# Patient Record
Sex: MALE | Race: WHITE | Employment: OTHER | URBAN - METROPOLITAN AREA
[De-identification: names, ages, dates, MRNs, and addresses within clinical notes are randomized per-mention and may not be internally consistent; named-entity substitution may affect disease eponyms.]

---

## 2017-01-10 ENCOUNTER — TELEPHONE (OUTPATIENT)
Dept: INTERNAL MEDICINE CLINIC | Facility: CLINIC | Age: 72
End: 2017-01-10

## 2017-01-10 ENCOUNTER — OFFICE VISIT (OUTPATIENT)
Dept: INTERNAL MEDICINE CLINIC | Facility: CLINIC | Age: 72
End: 2017-01-10

## 2017-01-10 VITALS
DIASTOLIC BLOOD PRESSURE: 90 MMHG | HEIGHT: 75 IN | TEMPERATURE: 98 F | BODY MASS INDEX: 25.24 KG/M2 | HEART RATE: 73 BPM | OXYGEN SATURATION: 99 % | WEIGHT: 203 LBS | SYSTOLIC BLOOD PRESSURE: 180 MMHG

## 2017-01-10 DIAGNOSIS — N28.9 RENAL INSUFFICIENCY: ICD-10-CM

## 2017-01-10 DIAGNOSIS — I10 HYPERTENSION, BENIGN: ICD-10-CM

## 2017-01-10 DIAGNOSIS — I48.91 ATRIAL FIBRILLATION, NEW ONSET (HCC): ICD-10-CM

## 2017-01-10 DIAGNOSIS — I25.9 CHRONIC ISCHEMIC HEART DISEASE: Primary | ICD-10-CM

## 2017-01-10 PROCEDURE — 99214 OFFICE O/P EST MOD 30 MIN: CPT | Performed by: INTERNAL MEDICINE

## 2017-01-10 PROCEDURE — G0463 HOSPITAL OUTPT CLINIC VISIT: HCPCS | Performed by: INTERNAL MEDICINE

## 2017-01-10 RX ORDER — DOCUSATE SODIUM 100 MG/1
100 CAPSULE, LIQUID FILLED ORAL 2 TIMES DAILY
Qty: 180 CAPSULE | Refills: 3 | Status: SHIPPED | OUTPATIENT
Start: 2017-01-10 | End: 2017-12-22

## 2017-01-10 NOTE — PROGRESS NOTES
Santa Green is a 66year old male. HPI:   He is feeling well and has no new problems or complaints. Good energy, good appetite. His BP has been normal at home and normal at other doctors offices. His BP today is elevated.      SR: no chest pain or sob, \"Management:  PTCA (1994)\"   • Cerebrovascular accident [facility]) 1993     per NextGen:  \"Cerebrovascular accident with LT ned - total resolution\"   • Carotid artery obstruction      per NextGen:  \"100% obstruction RT ICA; 50-60% stenosis L\"   • Atrial atraumatic, normocephalic,ears and throat are clear  NECK: supple,no adenopathy,no bruits  LUNGS: clear to auscultation  CARDIO: RRR without murmur  GI: good BS's,no masses, HSM or tenderness  EXTREMITIES: no cyanosis, clubbing; trace-1+ edema    ASSESSMEN

## 2017-01-10 NOTE — TELEPHONE ENCOUNTER
Yang faxed a PA for: Eliquis 5 mg tab  #180 qty     last filled: 01/10/17        * Not covered by insurance please prescribe something else thanks fax.  # [phone]  Placed in [address] folder routed to Rx

## 2017-01-13 ENCOUNTER — SURGERY (OUTPATIENT)
Age: 72
End: 2017-01-13

## 2017-01-13 NOTE — TELEPHONE ENCOUNTER
Spoke to pt---he has enough of supply from  pat assistance to last til the end of the month  Will file PA next week

## 2017-01-17 ENCOUNTER — APPOINTMENT (OUTPATIENT)
Dept: LAB | Facility: HOSPITAL | Age: 72
End: 2017-01-17
Attending: FAMILY MEDICINE
Payer: MEDICARE

## 2017-01-17 ENCOUNTER — TELEPHONE (OUTPATIENT)
Dept: INTERNAL MEDICINE CLINIC | Facility: CLINIC | Age: 72
End: 2017-01-17

## 2017-01-17 DIAGNOSIS — R82.90 ABNORMAL URINALYSIS: Primary | ICD-10-CM

## 2017-01-17 DIAGNOSIS — D50.8 OTHER IRON DEFICIENCY ANEMIA: ICD-10-CM

## 2017-01-17 DIAGNOSIS — D63.1 ANEMIA IN CHRONIC KIDNEY DISEASE: ICD-10-CM

## 2017-01-17 DIAGNOSIS — I48.91 ATRIAL FIBRILLATION, NEW ONSET (HCC): ICD-10-CM

## 2017-01-17 DIAGNOSIS — I10 HYPERTENSION, BENIGN: ICD-10-CM

## 2017-01-17 DIAGNOSIS — N28.9 RENAL INSUFFICIENCY: ICD-10-CM

## 2017-01-17 DIAGNOSIS — N18.9 ANEMIA IN CHRONIC KIDNEY DISEASE: ICD-10-CM

## 2017-01-17 DIAGNOSIS — I25.9 CHRONIC ISCHEMIC HEART DISEASE: ICD-10-CM

## 2017-01-17 LAB
BILIRUB UR QL: NEGATIVE
CHOLEST SERPL-MCNC: 178 MG/DL (ref 110–200)
COLOR UR: YELLOW
GLUCOSE UR-MCNC: NEGATIVE MG/DL
HDLC SERPL-MCNC: 114 MG/DL
KETONES UR-MCNC: NEGATIVE MG/DL
LDLC SERPL CALC-MCNC: 55 MG/DL (ref 0–99)
NITRITE UR QL STRIP.AUTO: NEGATIVE
NONHDLC SERPL-MCNC: 64 MG/DL
PH UR: 6 [PH] (ref 5–8)
PROT UR-MCNC: 30 MG/DL
PSA SERPL-MCNC: 1 NG/ML (ref 0–4)
RBC #/AREA URNS AUTO: 3 /HPF
SP GR UR STRIP: 1.01 (ref 1–1.03)
TRIGL SERPL-MCNC: 43 MG/DL (ref 1–149)
TSH SERPL-ACNC: 2.62 UIU/ML (ref 0.34–5.6)
UROBILINOGEN UR STRIP-ACNC: <2
VIT C UR-MCNC: NEGATIVE MG/DL
WBC #/AREA URNS AUTO: 461 /HPF

## 2017-01-17 PROCEDURE — 80061 LIPID PANEL: CPT

## 2017-01-17 PROCEDURE — 82607 VITAMIN B-12: CPT

## 2017-01-17 PROCEDURE — 84466 ASSAY OF TRANSFERRIN: CPT

## 2017-01-17 PROCEDURE — 84443 ASSAY THYROID STIM HORMONE: CPT

## 2017-01-17 PROCEDURE — 83540 ASSAY OF IRON: CPT

## 2017-01-17 PROCEDURE — 82728 ASSAY OF FERRITIN: CPT

## 2017-01-17 PROCEDURE — 82746 ASSAY OF FOLIC ACID SERUM: CPT

## 2017-01-17 PROCEDURE — 36415 COLL VENOUS BLD VENIPUNCTURE: CPT | Performed by: INTERNAL MEDICINE

## 2017-01-17 PROCEDURE — 81001 URINALYSIS AUTO W/SCOPE: CPT

## 2017-01-17 PROCEDURE — 80053 COMPREHEN METABOLIC PANEL: CPT | Performed by: INTERNAL MEDICINE

## 2017-01-17 PROCEDURE — 85025 COMPLETE CBC W/AUTO DIFF WBC: CPT | Performed by: INTERNAL MEDICINE

## 2017-01-17 NOTE — TELEPHONE ENCOUNTER
Called client services, unable to add on urine culture because it was not done in sterile cup. Ordered urine culture. Called patient. Patient denied any UTI symptoms. No pain/burning/difficulty urinating. He states he see's Dr. Justin Llamas next week.  Asked him t

## 2017-01-17 NOTE — TELEPHONE ENCOUNTER
Dr. Christine Lilly, please review patient labs on Dr. Yong Martinez behalf and advise. Ok to wait regarding UA? Pt was just discharged from hospital recently- but labs look like they were done after discharge.

## 2017-01-18 ENCOUNTER — APPOINTMENT (OUTPATIENT)
Dept: LAB | Age: 72
End: 2017-01-18
Attending: INTERNAL MEDICINE
Payer: MEDICARE

## 2017-01-18 DIAGNOSIS — R82.90 ABNORMAL URINALYSIS: ICD-10-CM

## 2017-01-18 PROCEDURE — 87088 URINE BACTERIA CULTURE: CPT

## 2017-01-18 PROCEDURE — 87077 CULTURE AEROBIC IDENTIFY: CPT

## 2017-01-18 PROCEDURE — 87184 SC STD DISK METHOD PER PLATE: CPT

## 2017-01-18 PROCEDURE — 87086 URINE CULTURE/COLONY COUNT: CPT

## 2017-01-18 PROCEDURE — 87186 SC STD MICRODIL/AGAR DIL: CPT

## 2017-01-19 NOTE — TELEPHONE ENCOUNTER
LM for pt that the 1-800 below is automated and states we need to call the 1-800 # on the back of the pt's card

## 2017-01-19 NOTE — TELEPHONE ENCOUNTER
Dr. Florina North - please review pt urine culture on Dr. Buckley Medicine behalf - prelim result + E.  Coli

## 2017-01-20 RX ORDER — NITROFURANTOIN 25; 75 MG/1; MG/1
100 CAPSULE ORAL 2 TIMES DAILY
Qty: 14 CAPSULE | Refills: 0 | Status: SHIPPED | OUTPATIENT
Start: 2017-01-20 | End: 2017-01-26 | Stop reason: ALTCHOICE

## 2017-01-20 NOTE — TELEPHONE ENCOUNTER
Nursing please call patient, tell him his urine is growing bacteria, but I still only have preliminary results, nonetheless I want him to start on antibiotics and we will call him with the final culture once completed, send in Macrobid 100 mg twice daily f

## 2017-01-22 ENCOUNTER — TELEPHONE (OUTPATIENT)
Dept: INTERNAL MEDICINE CLINIC | Facility: CLINIC | Age: 72
End: 2017-01-22

## 2017-01-22 NOTE — TELEPHONE ENCOUNTER
Recent labs good but he has recurrent UTI - I will talk to Dr Dionne Marshall on Monday and his office will call him. Lipids good, blood count mildly low as before, kidneys mildly impaired but very acceptable.

## 2017-01-23 NOTE — TELEPHONE ENCOUNTER
Form rec'd from [employer] regarding Eliquis requesting:   Formulary exception for Eliquis  Form placed in purple folder  Tasked to [employer]

## 2017-01-25 ENCOUNTER — TELEPHONE (OUTPATIENT)
Dept: INTERNAL MEDICINE CLINIC | Facility: CLINIC | Age: 72
End: 2017-01-25

## 2017-01-25 ENCOUNTER — PRIOR ORIGINAL RECORDS (OUTPATIENT)
Dept: OTHER | Age: 72
End: 2017-01-25

## 2017-01-25 ENCOUNTER — OFFICE VISIT (OUTPATIENT)
Dept: HEMATOLOGY/ONCOLOGY | Facility: HOSPITAL | Age: 72
End: 2017-01-25
Attending: INTERNAL MEDICINE
Payer: MEDICARE

## 2017-01-25 VITALS
BODY MASS INDEX: 25.41 KG/M2 | DIASTOLIC BLOOD PRESSURE: 92 MMHG | HEIGHT: 74 IN | TEMPERATURE: 98 F | SYSTOLIC BLOOD PRESSURE: 178 MMHG | RESPIRATION RATE: 18 BRPM | WEIGHT: 198 LBS | HEART RATE: 64 BPM

## 2017-01-25 DIAGNOSIS — N28.9 RENAL INSUFFICIENCY: ICD-10-CM

## 2017-01-25 DIAGNOSIS — K22.70 BARRETT'S ESOPHAGUS WITHOUT DYSPLASIA: ICD-10-CM

## 2017-01-25 DIAGNOSIS — D50.9 IRON DEFICIENCY ANEMIA, UNSPECIFIED IRON DEFICIENCY ANEMIA TYPE: Primary | ICD-10-CM

## 2017-01-25 PROCEDURE — 99213 OFFICE O/P EST LOW 20 MIN: CPT | Performed by: INTERNAL MEDICINE

## 2017-01-25 NOTE — TELEPHONE ENCOUNTER
Caio Holm will be re-fexing a form for a formulary change. don't see that we got this form this is regarding Eliquis. Ph# [phone] if needed press option 2.  Ref# [id number]

## 2017-01-25 NOTE — TELEPHONE ENCOUNTER
Per DR. COUCH faxed a message to  re: insurance will only cover Pradaxa or Xarelto and if  would make the change and notify the patient--see scanning

## 2017-01-25 NOTE — PROGRESS NOTES
HPI     Mr. Javon Capps is seen in the office for anemia. Pt underwent extensive work up in the past including bone marrow biopsy which was negative. Pt is on oral iron once daily, B12 and folate supplements. Pt has Quevedo's esophagus.  Underwent surveillan Dutasteride (AVODART) 0.5 MG Oral Cap Take 0.5 mg by mouth daily. Disp:  Rfl:    tamsulosin HCl 0.4 MG Oral Cap Take by mouth daily. Disp:  Rfl:    aspirin 81 MG Oral Tab Take 81 mg by mouth daily.  Disp:  Rfl:    Isosorbide Mononitrate ER 30 MG Oral Tabl per NextGen:  \"Anemia mild secondary to gastritis\"   • Anemia in chronic kidney disease 10/4/2013   • Iron deficiency anemia 7/16/2013   • Congestive heart failure (CHF) (HCC)    • Colon polyp    • Diverticulosis    • Recent change in frequency of bowel /92 mmHg  Pulse 64  Temp(Src) 97.9 °F (36.6 °C) (Oral)  Resp 18  Ht 1.88 m (6' 2\")  Wt 89.812 kg (198 lb)  BMI 25.41 kg/m2    Physical Exam   Constitutional: He is oriented to person, place, and time.  Vital signs are normal. He appears well-develope diagnosis)  Quevedo's esophagus without dysplasia  Renal insufficiency     In regards to the anemia, the etiology is multifactorial including iron deficiency, CKD. Pt is on oral iron, folate and B12 supplements. Hgb and Ferritin levels have improved.  Discu

## 2017-01-26 ENCOUNTER — PRIOR ORIGINAL RECORDS (OUTPATIENT)
Dept: OTHER | Age: 72
End: 2017-01-26

## 2017-01-26 ENCOUNTER — APPOINTMENT (OUTPATIENT)
Dept: LAB | Facility: HOSPITAL | Age: 72
End: 2017-01-26
Attending: UROLOGY
Payer: MEDICARE

## 2017-01-26 ENCOUNTER — OFFICE VISIT (OUTPATIENT)
Dept: SURGERY | Facility: CLINIC | Age: 72
End: 2017-01-26

## 2017-01-26 VITALS
SYSTOLIC BLOOD PRESSURE: 180 MMHG | DIASTOLIC BLOOD PRESSURE: 98 MMHG | RESPIRATION RATE: 20 BRPM | HEIGHT: 74 IN | TEMPERATURE: 98 F | HEART RATE: 82 BPM | WEIGHT: 198 LBS | BODY MASS INDEX: 25.41 KG/M2

## 2017-01-26 DIAGNOSIS — N30.00 ACUTE CYSTITIS WITHOUT HEMATURIA: ICD-10-CM

## 2017-01-26 DIAGNOSIS — N30.00 ACUTE CYSTITIS WITHOUT HEMATURIA: Primary | ICD-10-CM

## 2017-01-26 LAB
BACTERIA UR QL AUTO: NEGATIVE /HPF
BILIRUB UR QL: NEGATIVE
CLARITY UR: CLEAR
COLOR UR: YELLOW
GLUCOSE UR-MCNC: NEGATIVE MG/DL
HGB UR QL STRIP.AUTO: NEGATIVE
KETONES UR-MCNC: NEGATIVE MG/DL
LEUKOCYTE ESTERASE UR QL STRIP.AUTO: NEGATIVE
NITRITE UR QL STRIP.AUTO: NEGATIVE
PH UR: 7 [PH] (ref 5–8)
PROT UR-MCNC: 30 MG/DL
RBC #/AREA URNS AUTO: <1 /HPF
SP GR UR STRIP: 1.01 (ref 1–1.03)
UROBILINOGEN UR STRIP-ACNC: <2
VIT C UR-MCNC: NEGATIVE MG/DL
WBC #/AREA URNS AUTO: 2 /HPF

## 2017-01-26 PROCEDURE — 87086 URINE CULTURE/COLONY COUNT: CPT

## 2017-01-26 PROCEDURE — G0463 HOSPITAL OUTPT CLINIC VISIT: HCPCS | Performed by: UROLOGY

## 2017-01-26 PROCEDURE — 99214 OFFICE O/P EST MOD 30 MIN: CPT | Performed by: UROLOGY

## 2017-01-26 PROCEDURE — 81001 URINALYSIS AUTO W/SCOPE: CPT

## 2017-01-26 NOTE — TELEPHONE ENCOUNTER
Called patient. He already started taking Macrobid on 1/20 as prescribed by Dr Lex Franco . He has one more pill left to take tomorrow. He has an appointment with Dr Reny Bar tomorrow at 8 am.   Dr Maggie Jackson made aware.

## 2017-01-26 NOTE — PROGRESS NOTES
[address] Patient Status:  Outpatient    1945 MRN [id number]   Location [facility] Attending Ramya Hernandez.  [address] Day #  PCP Fritz Curling.  MD Austin Rios is a 66 ye include sebaceous cyst removed over 30 years ago right  hip pinning secondary to trauma 1994 angioplasty finally hemorrhoid surgery March 2016 and this was the reason for postop urinary retention also had cardioversion November 2015 although patient still Cap Take 0.5 mg by mouth daily. Disp:  Rfl:    tamsulosin HCl 0.4 MG Oral Cap Take by mouth daily. Disp:  Rfl:    aspirin 81 MG Oral Tab Take 81 mg by mouth daily.  Disp:  Rfl:    Isosorbide Mononitrate ER 30 MG Oral Tablet 24 Hr Take 1 tablet (30 mg total) Patient is a 77-year-old white male who underwent urgent hemorrhoidectomy secondary to chronic hemorrhoids March 2016.   However in the postop period he was found to have moderate to severe bleeding complicated by anticoagulation after surgery and again [facility] is in complete agreement spent 30 minutes with patient well over half time in face-to-face discussion of further evaluation and therapy and again we will contact patient with today's urinalysis and culture as soon as it becomes available.   Again we will ha

## 2017-01-26 NOTE — TELEPHONE ENCOUNTER
He has E coli UTI not sensitive to Cipro,     Will start Macrobid 100mg bid for 2 weeks and advise Dr Tyler Chan

## 2017-01-27 ENCOUNTER — PRIOR ORIGINAL RECORDS (OUTPATIENT)
Dept: OTHER | Age: 72
End: 2017-01-27

## 2017-01-27 ENCOUNTER — TELEPHONE (OUTPATIENT)
Dept: SURGERY | Facility: CLINIC | Age: 72
End: 2017-01-27

## 2017-01-27 NOTE — TELEPHONE ENCOUNTER
Notes Recorded by Joe Dumont MD on 1/27/2017 at 12:07 PM  Please contact patient with negative urine culture result      Pt contacted and aware of the results

## 2017-01-27 NOTE — TELEPHONE ENCOUNTER
[place] faxed a reminder that they are still waiting for PA for Eliquis. Placed in purple folder.

## 2017-01-30 NOTE — TELEPHONE ENCOUNTER
[place] faxed a note - \"Eliquis is not covered on pt's insurance. Please send alternative\". Placed in purple folder.                   Tasked to Rx

## 2017-01-31 ENCOUNTER — PRIOR ORIGINAL RECORDS (OUTPATIENT)
Dept: OTHER | Age: 72
End: 2017-01-31

## 2017-02-01 NOTE — TELEPHONE ENCOUNTER
Read notes prior to this---pt has already been changed by [facility] to formulary alternative ;   No action needed at this time

## 2017-02-28 ENCOUNTER — OFFICE VISIT (OUTPATIENT)
Dept: SURGERY | Facility: CLINIC | Age: 72
End: 2017-02-28

## 2017-02-28 VITALS
DIASTOLIC BLOOD PRESSURE: 80 MMHG | TEMPERATURE: 98 F | WEIGHT: 202 LBS | HEART RATE: 68 BPM | SYSTOLIC BLOOD PRESSURE: 160 MMHG | RESPIRATION RATE: 20 BRPM | HEIGHT: 75 IN | BODY MASS INDEX: 25.12 KG/M2

## 2017-02-28 DIAGNOSIS — R33.9 URINARY RETENTION: ICD-10-CM

## 2017-02-28 DIAGNOSIS — N30.00 ACUTE CYSTITIS WITHOUT HEMATURIA: Primary | ICD-10-CM

## 2017-02-28 LAB
BILIRUB UR QL: NEGATIVE
COLOR UR: YELLOW
GLUCOSE UR-MCNC: NEGATIVE MG/DL
HGB UR QL STRIP.AUTO: NEGATIVE
KETONES UR-MCNC: NEGATIVE MG/DL
NITRITE UR QL STRIP.AUTO: NEGATIVE
PH UR: 5 [PH] (ref 5–8)
PROT UR-MCNC: 100 MG/DL
RBC #/AREA URNS AUTO: 2 /HPF
SP GR UR STRIP: 1.01 (ref 1–1.03)
UROBILINOGEN UR STRIP-ACNC: <2
VIT C UR-MCNC: NEGATIVE MG/DL
WBC #/AREA URNS AUTO: 248 /HPF

## 2017-02-28 PROCEDURE — G0463 HOSPITAL OUTPT CLINIC VISIT: HCPCS | Performed by: UROLOGY

## 2017-02-28 PROCEDURE — 51798 US URINE CAPACITY MEASURE: CPT | Performed by: UROLOGY

## 2017-02-28 PROCEDURE — 99214 OFFICE O/P EST MOD 30 MIN: CPT | Performed by: UROLOGY

## 2017-02-28 RX ORDER — DUTASTERIDE 0.5 MG/1
CAPSULE, LIQUID FILLED ORAL
Qty: 30 CAPSULE | Refills: 11 | Status: SHIPPED | OUTPATIENT
Start: 2017-02-28 | End: 2018-07-25

## 2017-02-28 RX ORDER — RIVAROXABAN 15 MG/1
TABLET, FILM COATED ORAL
COMMUNITY
Start: 2017-02-27 | End: 2017-08-08

## 2017-02-28 NOTE — TELEPHONE ENCOUNTER
Dr. Flaca Vogel, pt [facility] 2/28/17 and he is asking for a refill on his dutasteride if you agree please review and sign med, thank you! Copied and pasted part of your note below. ..     Past medical history past surgical history medications allergies review of system

## 2017-02-28 NOTE — PROGRESS NOTES
[address] Patient Status:  Outpatient    1945 MRN [id number]   Location [address] [place] [address] Attending Enrique Oliva. [address] Day #  PCP Bessy Callahan.  MD Iliana Rios is a 66 ye Dutasteride (AVODART) 0.5 MG Oral Cap Take 0.5 mg by mouth daily. Disp:  Rfl:    tamsulosin HCl 0.4 MG Oral Cap Take by mouth daily. Disp:  Rfl:    aspirin 81 MG Oral Tab Take 81 mg by mouth daily.  Disp:  Rfl:    Isosorbide Mononitrate ER 30 MG Oral Tabl 1000 MCG Oral Tab Take  by mouth. take 1 by Oral route in the morning Disp:  Rfl:    Ferrous Sulfate 325 (65 FE) MG Oral Tab Take 325 mg by mouth daily with breakfast.   Disp:  Rfl:    folic acid (FOLVITE) 241 MCG Oral Tab Take  by mouth.  take 1 tablet (0. severe bleeding along with patient's anticoagulation for atrial fibrillation and shortly after his outpatient surgery he need to be hospitalized for urinary retention prior to above patient did admit to pre-existing BPH with obstruction however not bothers

## 2017-03-01 ENCOUNTER — TELEPHONE (OUTPATIENT)
Dept: SURGERY | Facility: CLINIC | Age: 72
End: 2017-03-01

## 2017-03-01 NOTE — TELEPHONE ENCOUNTER
I spoke with pt and informed him of [facility]'s results msg below and he would prefer to wait for the final sensitivities to see what abx would best tx the infection. He states that he does not have any symptoms at this time.  I asked him to call the office veronica

## 2017-03-01 NOTE — TELEPHONE ENCOUNTER
----- Message from Dionna Spencer MD sent at 3/1/2017  7:33 AM CST -----  Please contact patient with abnormal urinalysis consistent with infection.   We will wait culture results for further treatment since patient seems to suffer from recurrent UTIs possi

## 2017-03-01 NOTE — TELEPHONE ENCOUNTER
----- Message from Sravan Gagnon MD sent at 3/1/2017  1:55 PM CST -----  Please contact patient with positive urine culture result with a E. coli although final culture and sensitivities not available patient can either wait for 24 hours until we get cult

## 2017-03-02 RX ORDER — SULFAMETHOXAZOLE AND TRIMETHOPRIM 800; 160 MG/1; MG/1
1 TABLET ORAL 2 TIMES DAILY
Qty: 20 TABLET | Refills: 1 | Status: SHIPPED | OUTPATIENT
Start: 2017-03-02 | End: 2017-03-09

## 2017-03-02 NOTE — TELEPHONE ENCOUNTER
Patient contacted. Patient is aware of his urine culture results and medication sent to his pharmacy, and verbalized understanding.

## 2017-03-02 NOTE — TELEPHONE ENCOUNTER
LMTCB. Also, left message for patient to check his [facility]. When patient call back, please transfer to [phone]. Medication sent.

## 2017-03-02 NOTE — TELEPHONE ENCOUNTER
----- Message from Silvino Cisneros MD sent at 3/2/2017  8:04 AM CST -----  Please contact patient with positive urine culture result and E. coli is resistant to Cipro therefore appropriate treatment would be Bactrim DS 1 p.o. twice daily #20, one refill ple

## 2017-03-02 NOTE — TELEPHONE ENCOUNTER
I have no trouble with patient wanting to wait until final sensitivities are available for appropriate treatment with antibiotics will contact patient again when final culture and sensitivities available

## 2017-03-06 RX ORDER — PANTOPRAZOLE SODIUM 40 MG/1
TABLET, DELAYED RELEASE ORAL
Qty: 90 TABLET | Refills: 3 | Status: SHIPPED | OUTPATIENT
Start: 2017-03-06 | End: 2018-02-21

## 2017-05-04 RX ORDER — SIMVASTATIN 40 MG
TABLET ORAL
Qty: 90 TABLET | Refills: 3 | Status: SHIPPED | OUTPATIENT
Start: 2017-05-04 | End: 2018-04-20

## 2017-05-04 RX ORDER — HYDRALAZINE HYDROCHLORIDE 25 MG/1
TABLET, FILM COATED ORAL
Qty: 270 TABLET | Refills: 3 | Status: SHIPPED | OUTPATIENT
Start: 2017-05-04 | End: 2017-09-01

## 2017-05-04 RX ORDER — ISOSORBIDE MONONITRATE 30 MG/1
TABLET, EXTENDED RELEASE ORAL
Qty: 90 TABLET | Refills: 3 | Status: SHIPPED | OUTPATIENT
Start: 2017-05-04 | End: 2018-04-20

## 2017-05-04 NOTE — TELEPHONE ENCOUNTER
Refills done    To DR. KHOA Cameron 1/17/2017  AST 66 mildly elevated but prior levels were not;  PRINCE

## 2017-05-10 ENCOUNTER — LAB ENCOUNTER (OUTPATIENT)
Dept: LAB | Age: 72
End: 2017-05-10
Attending: INTERNAL MEDICINE
Payer: MEDICARE

## 2017-05-10 ENCOUNTER — PRIOR ORIGINAL RECORDS (OUTPATIENT)
Dept: OTHER | Age: 72
End: 2017-05-10

## 2017-05-10 ENCOUNTER — OFFICE VISIT (OUTPATIENT)
Dept: INTERNAL MEDICINE CLINIC | Facility: CLINIC | Age: 72
End: 2017-05-10

## 2017-05-10 VITALS
BODY MASS INDEX: 26.24 KG/M2 | HEART RATE: 88 BPM | TEMPERATURE: 98 F | SYSTOLIC BLOOD PRESSURE: 178 MMHG | DIASTOLIC BLOOD PRESSURE: 90 MMHG | WEIGHT: 211 LBS | HEIGHT: 75 IN | RESPIRATION RATE: 16 BRPM

## 2017-05-10 DIAGNOSIS — I25.9 CHRONIC ISCHEMIC HEART DISEASE: ICD-10-CM

## 2017-05-10 DIAGNOSIS — I65.22 CAROTID ARTERY STENOSIS WITHOUT CEREBRAL INFARCTION, LEFT: ICD-10-CM

## 2017-05-10 DIAGNOSIS — N28.9 RENAL INSUFFICIENCY: ICD-10-CM

## 2017-05-10 DIAGNOSIS — I10 HYPERTENSION, BENIGN: ICD-10-CM

## 2017-05-10 DIAGNOSIS — I48.91 ATRIAL FIBRILLATION, NEW ONSET (HCC): ICD-10-CM

## 2017-05-10 DIAGNOSIS — N18.9 ANEMIA IN CHRONIC KIDNEY DISEASE: ICD-10-CM

## 2017-05-10 DIAGNOSIS — D63.1 ANEMIA IN CHRONIC KIDNEY DISEASE: ICD-10-CM

## 2017-05-10 DIAGNOSIS — K22.70 BARRETT'S ESOPHAGUS WITHOUT DYSPLASIA: ICD-10-CM

## 2017-05-10 DIAGNOSIS — D63.1 ANEMIA IN CHRONIC KIDNEY DISEASE: Primary | ICD-10-CM

## 2017-05-10 DIAGNOSIS — N18.9 ANEMIA IN CHRONIC KIDNEY DISEASE: Primary | ICD-10-CM

## 2017-05-10 PROCEDURE — 80061 LIPID PANEL: CPT

## 2017-05-10 PROCEDURE — 99214 OFFICE O/P EST MOD 30 MIN: CPT | Performed by: INTERNAL MEDICINE

## 2017-05-10 PROCEDURE — 85025 COMPLETE CBC W/AUTO DIFF WBC: CPT

## 2017-05-10 PROCEDURE — 36415 COLL VENOUS BLD VENIPUNCTURE: CPT

## 2017-05-10 PROCEDURE — 80053 COMPREHEN METABOLIC PANEL: CPT

## 2017-05-10 PROCEDURE — G0463 HOSPITAL OUTPT CLINIC VISIT: HCPCS | Performed by: INTERNAL MEDICINE

## 2017-05-10 PROCEDURE — 84443 ASSAY THYROID STIM HORMONE: CPT

## 2017-05-10 RX ORDER — INDAPAMIDE 1.25 MG/1
TABLET, FILM COATED ORAL
Qty: 45 TABLET | Refills: 3 | Status: SHIPPED | OUTPATIENT
Start: 2017-05-10 | End: 2018-04-22

## 2017-05-10 NOTE — PROGRESS NOTES
Isidro Stevens is a 66year old male. HPI:   In general he has been feeling well except for aching in the legs (statins) which he has had for years. Good energy,  Good appetite. SR: no chest pain or sob, no gu or gi sx.         1. Anemia in chronic k NextGen:  \"Ischemic heart disease\";  \"Management:  PTCA (1994)\"   • Cerebrovascular accident [facility] 1993     per NextGen:  \"Cerebrovascular accident with LT ned - total resolution\"   • Carotid artery obstruction      per NextGen:  \"100% obstruction R 26.37 kg/m2  GENERAL: well developed, well nourished,in no apparent distress  SKIN: no rashes,no suspicious lesions  HEENT: atraumatic, normocephalic,ears and throat are clear  NECK: supple,no adenopathy,no bruits  LUNGS: clear to auscultation  CARDIO: RRR

## 2017-05-27 ENCOUNTER — HOSPITAL ENCOUNTER (OUTPATIENT)
Dept: ULTRASOUND IMAGING | Facility: HOSPITAL | Age: 72
Discharge: HOME | End: 2017-05-27
Attending: INTERNAL MEDICINE
Payer: MEDICARE

## 2017-05-27 DIAGNOSIS — I65.22 CAROTID ARTERY STENOSIS WITHOUT CEREBRAL INFARCTION, LEFT: ICD-10-CM

## 2017-05-27 PROCEDURE — 93880 EXTRACRANIAL BILAT STUDY: CPT | Performed by: INTERNAL MEDICINE

## 2017-06-13 ENCOUNTER — TELEPHONE (OUTPATIENT)
Dept: INTERNAL MEDICINE CLINIC | Facility: CLINIC | Age: 72
End: 2017-06-13

## 2017-06-13 DIAGNOSIS — D64.9 ANEMIA, UNSPECIFIED TYPE: Primary | ICD-10-CM

## 2017-06-13 DIAGNOSIS — N28.9 RENAL INSUFFICIENCY: ICD-10-CM

## 2017-06-13 NOTE — TELEPHONE ENCOUNTER
I discussed labs with Mr Hillary Millan - creatinine up from 1.6 to 1.9, Hb down from 11.4 to 9.2    I want to repeat labs and he will come in next month. Also, carotid US looks good.

## 2017-07-05 ENCOUNTER — PRIOR ORIGINAL RECORDS (OUTPATIENT)
Dept: OTHER | Age: 72
End: 2017-07-05

## 2017-07-05 ENCOUNTER — LAB ENCOUNTER (OUTPATIENT)
Dept: LAB | Age: 72
End: 2017-07-05
Attending: INTERNAL MEDICINE
Payer: MEDICARE

## 2017-07-05 DIAGNOSIS — N28.9 RENAL INSUFFICIENCY: ICD-10-CM

## 2017-07-05 DIAGNOSIS — D64.9 ANEMIA, UNSPECIFIED TYPE: ICD-10-CM

## 2017-07-05 LAB
ALBUMIN SERPL BCP-MCNC: 3.5 G/DL (ref 3.5–4.8)
ALBUMIN/GLOB SERPL: 1.1 {RATIO} (ref 1–2)
ALP SERPL-CCNC: 73 U/L (ref 32–100)
ALT SERPL-CCNC: 16 U/L (ref 17–63)
ANION GAP SERPL CALC-SCNC: 10 MMOL/L (ref 0–18)
AST SERPL-CCNC: 27 U/L (ref 15–41)
BASOPHILS # BLD: 0 K/UL (ref 0–0.2)
BASOPHILS NFR BLD: 1 %
BILIRUB SERPL-MCNC: 0.9 MG/DL (ref 0.3–1.2)
BUN SERPL-MCNC: 39 MG/DL (ref 8–20)
BUN/CREAT SERPL: 28.1 (ref 10–20)
CALCIUM SERPL-MCNC: 9 MG/DL (ref 8.5–10.5)
CHLORIDE SERPL-SCNC: 102 MMOL/L (ref 95–110)
CO2 SERPL-SCNC: 22 MMOL/L (ref 22–32)
CREAT SERPL-MCNC: 1.39 MG/DL (ref 0.5–1.5)
EOSINOPHIL # BLD: 0.1 K/UL (ref 0–0.7)
EOSINOPHIL NFR BLD: 1 %
ERYTHROCYTE [DISTWIDTH] IN BLOOD BY AUTOMATED COUNT: 13.2 % (ref 11–15)
FERRITIN SERPL IA-MCNC: 155 NG/ML (ref 24–336)
GLOBULIN PLAS-MCNC: 3.1 G/DL (ref 2.5–3.7)
GLUCOSE SERPL-MCNC: 92 MG/DL (ref 70–99)
HCT VFR BLD AUTO: 31.4 % (ref 41–52)
HGB BLD-MCNC: 10.6 G/DL (ref 13.5–17.5)
LYMPHOCYTES # BLD: 0.5 K/UL (ref 1–4)
LYMPHOCYTES NFR BLD: 9 %
MCH RBC QN AUTO: 32.7 PG (ref 27–32)
MCHC RBC AUTO-ENTMCNC: 33.7 G/DL (ref 32–37)
MCV RBC AUTO: 97 FL (ref 80–100)
MONOCYTES # BLD: 0.6 K/UL (ref 0–1)
MONOCYTES NFR BLD: 10 %
NEUTROPHILS # BLD AUTO: 4.4 K/UL (ref 1.8–7.7)
NEUTROPHILS NFR BLD: 79 %
OSMOLALITY UR CALC.SUM OF ELEC: 287 MOSM/KG (ref 275–295)
PLATELET # BLD AUTO: 224 K/UL (ref 140–400)
PMV BLD AUTO: 8.1 FL (ref 7.4–10.3)
POTASSIUM SERPL-SCNC: 4.5 MMOL/L (ref 3.3–5.1)
PROT SERPL-MCNC: 6.6 G/DL (ref 5.9–8.4)
RBC # BLD AUTO: 3.23 M/UL (ref 4.5–5.9)
SODIUM SERPL-SCNC: 134 MMOL/L (ref 136–144)
VIT B12 SERPL-MCNC: >1500 PG/ML (ref 181–914)
WBC # BLD AUTO: 5.6 K/UL (ref 4–11)

## 2017-07-05 PROCEDURE — 80053 COMPREHEN METABOLIC PANEL: CPT

## 2017-07-05 PROCEDURE — 82728 ASSAY OF FERRITIN: CPT

## 2017-07-05 PROCEDURE — 82607 VITAMIN B-12: CPT

## 2017-07-05 PROCEDURE — 36415 COLL VENOUS BLD VENIPUNCTURE: CPT

## 2017-07-05 PROCEDURE — 85025 COMPLETE CBC W/AUTO DIFF WBC: CPT

## 2017-07-11 ENCOUNTER — TELEPHONE (OUTPATIENT)
Dept: INTERNAL MEDICINE CLINIC | Facility: CLINIC | Age: 72
End: 2017-07-11

## 2017-07-11 NOTE — TELEPHONE ENCOUNTER
Labs considerably improved c Hb going from 9.2 to 10.6 and creatinine down from 1.9 to 1.3.     I am very happy with these results

## 2017-07-20 ENCOUNTER — HOSPITAL ENCOUNTER (EMERGENCY)
Facility: HOSPITAL | Age: 72
Discharge: HOME | End: 2017-07-20
Attending: EMERGENCY MEDICINE
Payer: MEDICARE

## 2017-07-20 VITALS
TEMPERATURE: 98 F | DIASTOLIC BLOOD PRESSURE: 94 MMHG | HEART RATE: 67 BPM | BODY MASS INDEX: 25.67 KG/M2 | RESPIRATION RATE: 18 BRPM | WEIGHT: 200 LBS | OXYGEN SATURATION: 97 % | SYSTOLIC BLOOD PRESSURE: 193 MMHG | HEIGHT: 74 IN

## 2017-07-20 DIAGNOSIS — N30.01 CYSTITIS, ACUTE HEMORRHAGIC: Primary | ICD-10-CM

## 2017-07-20 DIAGNOSIS — I15.9 SECONDARY HYPERTENSION: ICD-10-CM

## 2017-07-20 LAB
ANION GAP SERPL CALC-SCNC: 9 MMOL/L (ref 0–18)
APTT PPP: 49.6 SECONDS (ref 23.2–35.3)
BASOPHILS # BLD: 0 K/UL (ref 0–0.2)
BASOPHILS NFR BLD: 0 %
BILIRUB UR QL: NEGATIVE
BUN SERPL-MCNC: 33 MG/DL (ref 8–20)
BUN/CREAT SERPL: 25 (ref 10–20)
CALCIUM SERPL-MCNC: 9 MG/DL (ref 8.5–10.5)
CHLORIDE SERPL-SCNC: 105 MMOL/L (ref 95–110)
CO2 SERPL-SCNC: 24 MMOL/L (ref 22–32)
CREAT SERPL-MCNC: 1.32 MG/DL (ref 0.5–1.5)
EOSINOPHIL # BLD: 0.1 K/UL (ref 0–0.7)
EOSINOPHIL NFR BLD: 1 %
ERYTHROCYTE [DISTWIDTH] IN BLOOD BY AUTOMATED COUNT: 13.6 % (ref 11–15)
GLUCOSE SERPL-MCNC: 94 MG/DL (ref 70–99)
GLUCOSE UR-MCNC: NEGATIVE MG/DL
HCT VFR BLD AUTO: 31.9 % (ref 41–52)
HGB BLD-MCNC: 10.6 G/DL (ref 13.5–17.5)
INR BLD: 2.8 (ref 0.9–1.2)
KETONES UR-MCNC: NEGATIVE MG/DL
LYMPHOCYTES # BLD: 0.3 K/UL (ref 1–4)
LYMPHOCYTES NFR BLD: 6 %
MCH RBC QN AUTO: 31.9 PG (ref 27–32)
MCHC RBC AUTO-ENTMCNC: 33.2 G/DL (ref 32–37)
MCV RBC AUTO: 96 FL (ref 80–100)
MONOCYTES # BLD: 0.4 K/UL (ref 0–1)
MONOCYTES NFR BLD: 7 %
NEUTROPHILS # BLD AUTO: 5.4 K/UL (ref 1.8–7.7)
NEUTROPHILS NFR BLD: 86 %
NITRITE UR QL STRIP.AUTO: NEGATIVE
OSMOLALITY UR CALC.SUM OF ELEC: 293 MOSM/KG (ref 275–295)
PH UR: 6 [PH] (ref 5–8)
PLATELET # BLD AUTO: 199 K/UL (ref 140–400)
PMV BLD AUTO: 8.4 FL (ref 7.4–10.3)
POTASSIUM SERPL-SCNC: 4.3 MMOL/L (ref 3.3–5.1)
PROT UR-MCNC: 100 MG/DL
PROTHROMBIN TIME: 29.2 SECONDS (ref 11.8–14.5)
RBC # BLD AUTO: 3.32 M/UL (ref 4.5–5.9)
RBC #/AREA URNS AUTO: 6198 /HPF
SODIUM SERPL-SCNC: 138 MMOL/L (ref 136–144)
SP GR UR STRIP: 1.01 (ref 1–1.03)
UROBILINOGEN UR STRIP-ACNC: <2
VIT C UR-MCNC: NEGATIVE MG/DL
WBC # BLD AUTO: 6.2 K/UL (ref 4–11)
WBC #/AREA URNS AUTO: 349 /HPF

## 2017-07-20 PROCEDURE — 99284 EMERGENCY DEPT VISIT MOD MDM: CPT

## 2017-07-20 PROCEDURE — 96361 HYDRATE IV INFUSION ADD-ON: CPT

## 2017-07-20 PROCEDURE — 96365 THER/PROPH/DIAG IV INF INIT: CPT

## 2017-07-20 PROCEDURE — 85610 PROTHROMBIN TIME: CPT | Performed by: EMERGENCY MEDICINE

## 2017-07-20 PROCEDURE — 81001 URINALYSIS AUTO W/SCOPE: CPT | Performed by: EMERGENCY MEDICINE

## 2017-07-20 PROCEDURE — 80048 BASIC METABOLIC PNL TOTAL CA: CPT | Performed by: EMERGENCY MEDICINE

## 2017-07-20 PROCEDURE — 87186 SC STD MICRODIL/AGAR DIL: CPT | Performed by: EMERGENCY MEDICINE

## 2017-07-20 PROCEDURE — 87086 URINE CULTURE/COLONY COUNT: CPT | Performed by: EMERGENCY MEDICINE

## 2017-07-20 PROCEDURE — 85730 THROMBOPLASTIN TIME PARTIAL: CPT | Performed by: EMERGENCY MEDICINE

## 2017-07-20 PROCEDURE — 85025 COMPLETE CBC W/AUTO DIFF WBC: CPT | Performed by: EMERGENCY MEDICINE

## 2017-07-20 PROCEDURE — 87088 URINE BACTERIA CULTURE: CPT | Performed by: EMERGENCY MEDICINE

## 2017-07-20 RX ORDER — HYDRALAZINE HYDROCHLORIDE 25 MG/1
25 TABLET, FILM COATED ORAL EVERY 8 HOURS SCHEDULED
Status: DISCONTINUED | OUTPATIENT
Start: 2017-07-20 | End: 2017-07-20

## 2017-07-20 RX ORDER — CEPHALEXIN 500 MG/1
500 CAPSULE ORAL 3 TIMES DAILY
Qty: 21 CAPSULE | Refills: 0 | Status: SHIPPED | OUTPATIENT
Start: 2017-07-20 | End: 2017-08-08

## 2017-07-20 RX ORDER — HYDRALAZINE HYDROCHLORIDE 25 MG/1
25 TABLET, FILM COATED ORAL ONCE
Status: COMPLETED | OUTPATIENT
Start: 2017-07-20 | End: 2017-07-20

## 2017-07-20 RX ORDER — HYDRALAZINE HYDROCHLORIDE 25 MG/1
25 TABLET, FILM COATED ORAL 3 TIMES DAILY
Qty: 42 TABLET | Refills: 0 | Status: SHIPPED | OUTPATIENT
Start: 2017-07-20 | End: 2017-08-08

## 2017-07-20 RX ORDER — SODIUM CHLORIDE 9 MG/ML
INJECTION, SOLUTION INTRAVENOUS ONCE
Status: COMPLETED | OUTPATIENT
Start: 2017-07-20 | End: 2017-07-20

## 2017-07-20 NOTE — ED NOTES
Pt c/o bloody urine this morning, with some burning with urination. Pt was able to urinate. Gross hematuria noted. Pt taking Xarelto for Afib x 2 years. States last year had to have a catheter and has had a lot of bladder infections since then.

## 2017-07-20 NOTE — ED INITIAL ASSESSMENT (HPI)
Pt reprots hematuria since early this morning. Pt reports burning with urination. Pt reports taking xarelto.

## 2017-07-20 NOTE — ED PROVIDER NOTES
Patient Seen in: [facility] Emergency Department    History   Patient presents with:  Bleeding (hematologic)    Stated Complaint:     HPI    Patient presents the emergency department complaining of gross hematuria.   He states that during the middl ANGIOGRAM  1994: CATH PERCUTANEOUS  TRANSLUMINAL CORONARY ANGIO*      Comment: per NextGen:  \"Ischemic heart disease\";                 \"Management:  PTCA (1994)\"  09-,01/2015: COLONOSCOPY  1/13/2017: EGD N/A      Comment: Procedure: Remy Mcdonough Father      per NextGen:  \"Premature CAD\"   • Hypertension Father    • Other[other] Ilsa Oliva Mother 80     per NextGen   • Hypertension Mother    • Breast Cancer Mother    • Cancer Sister      lung cancer       Smoking status: Former Smoker Abnormal; Notable for the following:        Result Value    BUN 33 (*)     BUN/CREA Ratio 25.0 (*)     GFR, Non- 53 (*)     All other components within normal limits   PROTHROMBIN TIME (PT) - Abnormal; Notable for the following:     PT 29. 2 cystitis. Will treat with abx and have follow up with Dr. Roxanne Gutiérrez. Discussed with Urology. BP elevated here. Pt states \"it always is. \" No symptoms.  Discussed with Dr. Steven Banerjee, recommended increasing Hydralazine to 50mg TID and recheck in office in next w

## 2017-07-21 ENCOUNTER — TELEPHONE (OUTPATIENT)
Dept: SURGERY | Facility: CLINIC | Age: 72
End: 2017-07-21

## 2017-07-21 NOTE — TELEPHONE ENCOUNTER
Pt stts that he was seen in ER 07/20 for blood in urine and bladder infection - instructed to f/u asap - please advise - thank you.

## 2017-07-25 NOTE — TELEPHONE ENCOUNTER
Spoke to patient. Offered patient ER f/u for Tues 8/1 @ 12:00 pm. Patient agreed. Patient denies gross hematuria at this time.

## 2017-07-26 LAB
ALBUMIN: 3.5 G/DL
ALBUMIN: 3.5 G/DL
ALKALINE PHOSPHATATE(ALK PHOS): 68 IU/L
ALKALINE PHOSPHATATE(ALK PHOS): 73 IU/L
ALT (SGPT): 16 U/L
AST (SGOT): 22 U/L
BILIRUBIN TOTAL: 0.6 MG/DL
BILIRUBIN TOTAL: 0.9 MG/DL
BUN: 34 MG/DL
BUN: 39 MG/DL
CALCIUM: 9 MG/DL
CALCIUM: 9.2 MG/DL
CHLORIDE: 102 MEQ/L
CHLORIDE: 102 MEQ/L
CHOLESTEROL, TOTAL: 149 MG/DL
CREATININE, SERUM: 1.39 MG/DL
CREATININE, SERUM: 1.97 MG/DL
GLOBULIN: 3 G/DL
GLOBULIN: 3.1 G/DL
GLUCOSE: 82 MG/DL
GLUCOSE: 82 MG/DL
GLUCOSE: 92 MG/DL
HDL CHOLESTEROL: 95 MG/DL
HEMATOCRIT: 27.5 %
HEMATOCRIT: 31.4 %
HEMOGLOBIN: 10.6 G/DL
HEMOGLOBIN: 9.2 G/DL
LDL CHOLESTEROL: 49 MG/DL
NON-HDL CHOLESTEROL: 54 MG/DL
PLATELETS: 202 K/UL
PLATELETS: 224 K/UL
POTASSIUM, SERUM: 4.5 MEQ/L
POTASSIUM, SERUM: 5.1 MEQ/L
PROTEIN, TOTAL: 6.5 G/DL
PROTEIN, TOTAL: 6.6 G/DL
RED BLOOD COUNT: 2.78 X 10-6/U
RED BLOOD COUNT: 3.23 X 10-6/U
SGOT (AST): 22 IU/L
SGOT (AST): 27 IU/L
SGPT (ALT): 16 IU/L
SGPT (ALT): 16 IU/L
SODIUM: 134 MEQ/L
SODIUM: 134 MEQ/L
THYROID STIMULATING HORMONE: 2.63 MLU/L
TRIGLYCERIDES: 27 MG/DL
WHITE BLOOD COUNT: 4.9 X 10-3/U
WHITE BLOOD COUNT: 5.6 X 10-3/U

## 2017-07-28 ENCOUNTER — PRIOR ORIGINAL RECORDS (OUTPATIENT)
Dept: OTHER | Age: 72
End: 2017-07-28

## 2017-07-29 ENCOUNTER — PRIOR ORIGINAL RECORDS (OUTPATIENT)
Dept: OTHER | Age: 72
End: 2017-07-29

## 2017-07-31 ENCOUNTER — PRIOR ORIGINAL RECORDS (OUTPATIENT)
Dept: OTHER | Age: 72
End: 2017-07-31

## 2017-08-01 ENCOUNTER — HOSPITAL ENCOUNTER (OUTPATIENT)
Dept: GENERAL RADIOLOGY | Facility: HOSPITAL | Age: 72
Discharge: HOME | End: 2017-08-01
Attending: INTERNAL MEDICINE | Admitting: UROLOGY
Payer: MEDICARE

## 2017-08-01 ENCOUNTER — APPOINTMENT (OUTPATIENT)
Dept: LAB | Facility: HOSPITAL | Age: 72
End: 2017-08-01
Attending: UROLOGY
Payer: MEDICARE

## 2017-08-01 ENCOUNTER — OFFICE VISIT (OUTPATIENT)
Dept: SURGERY | Facility: CLINIC | Age: 72
End: 2017-08-01

## 2017-08-01 VITALS
BODY MASS INDEX: 25.67 KG/M2 | HEIGHT: 74 IN | RESPIRATION RATE: 16 BRPM | DIASTOLIC BLOOD PRESSURE: 72 MMHG | SYSTOLIC BLOOD PRESSURE: 171 MMHG | WEIGHT: 200 LBS | HEART RATE: 65 BPM

## 2017-08-01 DIAGNOSIS — N30.01 ACUTE CYSTITIS WITH HEMATURIA: Primary | ICD-10-CM

## 2017-08-01 DIAGNOSIS — N30.01 ACUTE CYSTITIS WITH HEMATURIA: ICD-10-CM

## 2017-08-01 DIAGNOSIS — I25.10 CORONARY ATHEROSCLEROSIS OF NATIVE CORONARY ARTERY: ICD-10-CM

## 2017-08-01 PROCEDURE — 87086 URINE CULTURE/COLONY COUNT: CPT

## 2017-08-01 PROCEDURE — 87088 URINE BACTERIA CULTURE: CPT

## 2017-08-01 PROCEDURE — 71020 XR CHEST PA + LAT CHEST (CPT=71020): CPT | Performed by: INTERNAL MEDICINE

## 2017-08-01 PROCEDURE — 87186 SC STD MICRODIL/AGAR DIL: CPT

## 2017-08-01 PROCEDURE — 99214 OFFICE O/P EST MOD 30 MIN: CPT | Performed by: UROLOGY

## 2017-08-01 PROCEDURE — G0463 HOSPITAL OUTPT CLINIC VISIT: HCPCS | Performed by: UROLOGY

## 2017-08-01 NOTE — PROGRESS NOTES
[address] Patient Status:  Outpatient    1945 MRN [id number]   Location [facility] Attending Huan Suersh. [address] Day # 0 PCP Natalie Styles.  MD Blanca       Jorge Burn is a 66 y Tab Take 1 tablet (25 mg total) by mouth 3 (three) times daily.  Disp: 42 tablet Rfl: 0   indapamide 1.25 MG Oral Tab Take one every other day for BP and fluid retention Disp: 45 tablet Rfl: 3   ISOSORBIDE MONONITRATE ER 30 MG Oral Tablet 24 Hr TAKE ONE TAB PANTOPRAZOLE SODIUM 40 MG Oral Tab EC TAKE ONE TABLET BY MOUTH EVERY DAY Disp: 90 tablet Rfl: 3   DUTASTERIDE 0.5 MG Oral Cap TAKE ONE CAPSULE BY MOUTH EVERY DAY Disp: 30 capsule Rfl: 11   XARELTO 15 MG Oral Tab  Disp:  Rfl:    docusate sodium (DOCQLACE) today      ASSESSMENT AND PLAN:  Case summary: Patient is a 22-year-old white male who underwent urgent hemorrhoidectomy secondary chronic hemorrhoids March 7170 complicated by postop urinary retention this was also complicated by continued rectal bleeding encouraged patient to contain maximal medical therapy and the patient is ever symptomatic we be happy to elect urine and treat appropriately with culture if positive.   I answered all the patient's questions spending 30 minutes with patient and well over ha

## 2017-08-02 ENCOUNTER — TELEPHONE (OUTPATIENT)
Dept: INTERNAL MEDICINE CLINIC | Facility: CLINIC | Age: 72
End: 2017-08-02

## 2017-08-02 ENCOUNTER — TELEPHONE (OUTPATIENT)
Dept: PULMONOLOGY | Facility: CLINIC | Age: 72
End: 2017-08-02

## 2017-08-02 ENCOUNTER — PRIOR ORIGINAL RECORDS (OUTPATIENT)
Dept: OTHER | Age: 72
End: 2017-08-02

## 2017-08-02 NOTE — TELEPHONE ENCOUNTER
Tiffanie Boss from [facility] office calling pt was calling a lot complaining of shortness of breath. Pt had chest xray it showed he has large left pleural effusion.

## 2017-08-02 NOTE — TELEPHONE ENCOUNTER
Pt was seen by Dr Andre Jenkins yesterday for +SOB; CXR showed a large left pleural effusion (new finding); on Xarelto for atrial fibrillation; Dr Andre Jenkins called to notify of CXR findings; I called patient and notified pt of large pleural effusion; pt was advised

## 2017-08-02 NOTE — TELEPHONE ENCOUNTER
Per Dr. Jame Jaramillo ok to add tomorrow 8/3 @ 1:30 pm, pt accepted, location information provided. Per Dr. Deonna Gómez pt to hold Xarelto tomorrow until further direction by Dr. Jame Jaramillo at visit, possible thoracentesis Monday 8/7.  Pt voiced understanding, appt 1:30

## 2017-08-02 NOTE — TELEPHONE ENCOUNTER
Pt calling to request an appt for consultation with Dr. Umer Mcduffie. Pt indicates he was advised by his PCP to be seen due to fluid in his lungs. I offered appt date: 9/13, but pt would like to be seen sooner, pls call at:[phone],thanks.

## 2017-08-02 NOTE — TELEPHONE ENCOUNTER
To DR KHOA MORRISON--  Notified Pilar Knapp at [facility] that DR COUCH is out of ofc today   Could pass  This message on to our DR on call but  Suggested that she may want to let DR Johann Hanley know about this pt and his sx see if pt needs to go to ER or Yuko Styles states

## 2017-08-02 NOTE — TELEPHONE ENCOUNTER
Per Dr. Lester Garcia add pt to schedule this Friday 8/4 @ 12pm, pt offered appt, pt declined. Pt demanding appt at hospital. Informed a msg will be sent to Dr. Lester Garcia to advise.

## 2017-08-03 ENCOUNTER — TELEPHONE (OUTPATIENT)
Dept: SURGERY | Facility: CLINIC | Age: 72
End: 2017-08-03

## 2017-08-03 ENCOUNTER — TELEPHONE (OUTPATIENT)
Dept: PULMONOLOGY | Facility: CLINIC | Age: 72
End: 2017-08-03

## 2017-08-03 ENCOUNTER — OFFICE VISIT (OUTPATIENT)
Dept: PULMONOLOGY | Facility: CLINIC | Age: 72
End: 2017-08-03

## 2017-08-03 VITALS
OXYGEN SATURATION: 98 % | BODY MASS INDEX: 27.08 KG/M2 | SYSTOLIC BLOOD PRESSURE: 170 MMHG | DIASTOLIC BLOOD PRESSURE: 70 MMHG | HEIGHT: 74 IN | WEIGHT: 211 LBS | RESPIRATION RATE: 20 BRPM | HEART RATE: 66 BPM

## 2017-08-03 DIAGNOSIS — J90 PLEURAL EFFUSION: Primary | ICD-10-CM

## 2017-08-03 PROCEDURE — G0463 HOSPITAL OUTPT CLINIC VISIT: HCPCS | Performed by: INTERNAL MEDICINE

## 2017-08-03 PROCEDURE — 99204 OFFICE O/P NEW MOD 45 MIN: CPT | Performed by: INTERNAL MEDICINE

## 2017-08-03 RX ORDER — SULFAMETHOXAZOLE AND TRIMETHOPRIM 800; 160 MG/1; MG/1
1 TABLET ORAL 2 TIMES DAILY
Qty: 20 TABLET | Refills: 1 | Status: SHIPPED | OUTPATIENT
Start: 2017-08-03 | End: 2017-08-10

## 2017-08-03 NOTE — TELEPHONE ENCOUNTER
----- Message from Henrry Dietz MD sent at 8/3/2017  7:20 AM CDT -----  Please contact patient with positive urine culture result however resistant to quinolones with E. coli appropriate treatment would be Bactrim DS 1 p.o. twice daily #20, one refill pr

## 2017-08-03 NOTE — TELEPHONE ENCOUNTER
Edda from [address] states they can do thoracentesis 2:00 pm Monday 8-7-17 pt to arrive 1:30. Pt notified of time date and place of appt. Pt verbalized understanding.

## 2017-08-03 NOTE — TELEPHONE ENCOUNTER
Patient contacted. Patient is aware of his test results, medication sent to his [address] [place] [address] Street, and verbalized understanding. All questions answered.

## 2017-08-03 NOTE — TELEPHONE ENCOUNTER
Per Dr. Jori Mills, thoracentesis needs to be scheduled with Dr. Fan Reece on Monday. Message left for Leona in [address] to call back.

## 2017-08-03 NOTE — H&P
Referring Physician  Radha Eric. MD Blanca    Chief Complaint  Pleural effusion    History of Present Illness  Patient is a 64-year-old  male who presents after recent chest x-ray revealed evidence of left pleural effusion.   Patient denies any histo daily. Disp: 21 capsule Rfl: 0   hydrALAzine HCl 25 MG Oral Tab Take 1 tablet (25 mg total) by mouth 3 (three) times daily.  Disp: 42 tablet Rfl: 0   indapamide 1.25 MG Oral Tab Take one every other day for BP and fluid retention Disp: 45 tablet Rfl: 3   IS effusion    Pulmonary Function Testing  None    Assessment  1. Left pleural effusion  2. Dyspnea with exertion  3.   Atrial fibrillation    Plan  -Seen today after evidence of worsening dyspnea with exertion over the course of last 6 weeks along with mode

## 2017-08-03 NOTE — TELEPHONE ENCOUNTER
----- Message from Maritza Smith MD sent at 8/2/2017 12:18 PM CDT -----  Please contact patient with positive urine culture result however we do not have sensitivities we will contact patient probably tomorrow once the sensitivities are available and what

## 2017-08-07 ENCOUNTER — HOSPITAL ENCOUNTER (OUTPATIENT)
Dept: ULTRASOUND IMAGING | Facility: HOSPITAL | Age: 72
Discharge: HOME | End: 2017-08-07
Attending: INTERNAL MEDICINE
Payer: MEDICARE

## 2017-08-07 ENCOUNTER — HOSPITAL ENCOUNTER (OUTPATIENT)
Dept: GENERAL RADIOLOGY | Facility: HOSPITAL | Age: 72
Discharge: HOME | End: 2017-08-07
Attending: INTERNAL MEDICINE
Payer: MEDICARE

## 2017-08-07 VITALS
HEART RATE: 54 BPM | HEIGHT: 74 IN | BODY MASS INDEX: 26.95 KG/M2 | WEIGHT: 210 LBS | OXYGEN SATURATION: 98 % | SYSTOLIC BLOOD PRESSURE: 159 MMHG | DIASTOLIC BLOOD PRESSURE: 83 MMHG

## 2017-08-07 DIAGNOSIS — J90 PLEURAL EFFUSION: ICD-10-CM

## 2017-08-07 LAB
BASOPHILS NFR FLD: 0 %
COLOR FLD: YELLOW
EOSINOPHIL NFR FLD: 0 %
LDH PLEURAL FLUID: 47 U/L
LYMPHOCYTES NFR CSF: 1 %
LYMPHOCYTES NFR FLD: 28 %
MONOCYTES NFR FLD: 30 %
NEUTROPHILS NFR CSF: 41 %
RBC # FLD: 36 /CUMM (ref ?–1)
TOTAL PROTEIN PLEURAL FLUID: 2.7 G/DL
TURBIDITY CSF QL: CLEAR
WBC # FLD: 143 /CUMM (ref ?–1)

## 2017-08-07 PROCEDURE — 71010 XR CHEST AP PORTABLE  (CPT=71010): CPT | Performed by: INTERNAL MEDICINE

## 2017-08-07 PROCEDURE — 32555 ASPIRATE PLEURA W/ IMAGING: CPT | Performed by: INTERNAL MEDICINE

## 2017-08-07 NOTE — PROCEDURES
Thoracentesis Procedure Note    Pre-operative Diagnosis:  Left pleural effusion     Post-operative Diagnosis:  same    Indications:  Large left pleural effusion     Procedure Details   Consent: Informed consent was obtained.  Risks of the procedure were dis

## 2017-08-07 NOTE — IMAGING NOTE
1404:  Mr. Maria De Jesus Lopez brought to Ultrasound room 3. Pre-procedure vital signs obtained: Blood Pressure 184/89, Heart rate 68, oxygen saturation 99% room air. 1411: Procedure explained and written consent obtained.   1418: Pre-procedure Ultrasound images obtain

## 2017-08-07 NOTE — IMAGING NOTE
[facility]  Dr Kim Monae   1435: TIME OUT COMPLETE  1440: POSTERIOR CHEST PREPPED AND DRAPED IN STERILE MANNER  1441: LIDOCAINE ADMINISTERED FOR ANESTHETIC AFFECT.   A TOTAL OF 7.0ML ADMINISTERED  1443: 10.0ML PLEURAL FLUID ASPI

## 2017-08-08 ENCOUNTER — OFFICE VISIT (OUTPATIENT)
Dept: INTERNAL MEDICINE CLINIC | Facility: CLINIC | Age: 72
End: 2017-08-08

## 2017-08-08 ENCOUNTER — TELEPHONE (OUTPATIENT)
Dept: INTERNAL MEDICINE CLINIC | Facility: CLINIC | Age: 72
End: 2017-08-08

## 2017-08-08 VITALS
WEIGHT: 209 LBS | DIASTOLIC BLOOD PRESSURE: 64 MMHG | TEMPERATURE: 98 F | HEIGHT: 74 IN | BODY MASS INDEX: 26.82 KG/M2 | OXYGEN SATURATION: 98 % | HEART RATE: 60 BPM | SYSTOLIC BLOOD PRESSURE: 122 MMHG

## 2017-08-08 DIAGNOSIS — I10 HYPERTENSION, BENIGN: ICD-10-CM

## 2017-08-08 DIAGNOSIS — I25.9 CHRONIC ISCHEMIC HEART DISEASE: ICD-10-CM

## 2017-08-08 DIAGNOSIS — R31.0 GROSS HEMATURIA: Primary | ICD-10-CM

## 2017-08-08 DIAGNOSIS — N28.9 RENAL INSUFFICIENCY: ICD-10-CM

## 2017-08-08 DIAGNOSIS — J90 PLEURAL EFFUSION: ICD-10-CM

## 2017-08-08 PROCEDURE — 99214 OFFICE O/P EST MOD 30 MIN: CPT | Performed by: INTERNAL MEDICINE

## 2017-08-08 PROCEDURE — G0463 HOSPITAL OUTPT CLINIC VISIT: HCPCS | Performed by: INTERNAL MEDICINE

## 2017-08-08 RX ORDER — LABETALOL 200 MG/1
200 TABLET, FILM COATED ORAL 2 TIMES DAILY
Status: ON HOLD | COMMUNITY
End: 2021-01-01

## 2017-08-08 NOTE — TELEPHONE ENCOUNTER
Paperwork for Eliquis patient assistance done and faxed;   Changed from Xarelto as per  note today and discussion with

## 2017-08-08 NOTE — PROGRESS NOTES
Fran Clark is a 66year old male. HPI:   1. Gross hematuria - he went to ED on 7/20 -m found to have E coli UTI - cefalexin 500 mg bid for 10 days Dr Codie Estes - he had no sx other than hematuria - no further blood since 2 days after starting drug.       2 Oral Cap Take 1 capsule (100 mg total) by mouth 2 (two) times daily. Disp: 180 capsule Rfl: 3   tamsulosin HCl 0.4 MG Oral Cap Take by mouth daily. Disp:  Rfl:    aspirin 81 MG Oral Tab Take 81 mg by mouth daily.  Disp:  Rfl:    Vitamin B-12 (VITAMIN B12) 1 Former Smoker                                                              Packs/day: 1.00      Years: 45.00        Types: Cigarettes     Quit date: 1/1/1993  Smokeless tobacco: Never Used                      Alcohol use: Yes           1.0 oz/week     Lakshmi

## 2017-08-11 ENCOUNTER — TELEPHONE (OUTPATIENT)
Dept: PULMONOLOGY | Facility: CLINIC | Age: 72
End: 2017-08-11

## 2017-08-11 NOTE — TELEPHONE ENCOUNTER
Pt requesting an appt next week due to follow up from procedure done on 8/7/17. Pt states he was told to follow up with [facility] within a week.  Please Call Thank You

## 2017-08-11 NOTE — TELEPHONE ENCOUNTER
You may double book me for next Thursday or Friday for the patient. I would prefer the first half of the schedule on Thursday if possible.

## 2017-08-11 NOTE — TELEPHONE ENCOUNTER
Appt scheduled for August 17th at 1:30pm. Pt aware of date, time and location [facility]

## 2017-08-14 RX ORDER — TAMSULOSIN HYDROCHLORIDE 0.4 MG/1
CAPSULE ORAL
Qty: 30 CAPSULE | Refills: 11 | Status: SHIPPED | OUTPATIENT
Start: 2017-08-14 | End: 2018-07-25

## 2017-08-14 NOTE — TELEPHONE ENCOUNTER
Dr. Soraya Louie, pt [facility] 8/1/17 pt pharmacy is requesting refill on tamsulosin if you agree please review and sign med, thank you!

## 2017-08-17 ENCOUNTER — HOSPITAL ENCOUNTER (OUTPATIENT)
Dept: GENERAL RADIOLOGY | Facility: HOSPITAL | Age: 72
Discharge: HOME | End: 2017-08-17
Attending: INTERNAL MEDICINE

## 2017-08-17 ENCOUNTER — OFFICE VISIT (OUTPATIENT)
Dept: PULMONOLOGY | Facility: CLINIC | Age: 72
End: 2017-08-17

## 2017-08-17 VITALS
BODY MASS INDEX: 28.48 KG/M2 | DIASTOLIC BLOOD PRESSURE: 87 MMHG | WEIGHT: 210.25 LBS | SYSTOLIC BLOOD PRESSURE: 177 MMHG | HEIGHT: 72 IN | OXYGEN SATURATION: 96 % | HEART RATE: 58 BPM

## 2017-08-17 DIAGNOSIS — J90 PLEURAL EFFUSION, LEFT: ICD-10-CM

## 2017-08-17 DIAGNOSIS — J90 PLEURAL EFFUSION, LEFT: Primary | ICD-10-CM

## 2017-08-17 PROCEDURE — 71020 XR CHEST PA + LAT CHEST (CPT=71020): CPT | Performed by: INTERNAL MEDICINE

## 2017-08-17 PROCEDURE — G0463 HOSPITAL OUTPT CLINIC VISIT: HCPCS | Performed by: INTERNAL MEDICINE

## 2017-08-17 PROCEDURE — 99213 OFFICE O/P EST LOW 20 MIN: CPT | Performed by: INTERNAL MEDICINE

## 2017-08-17 NOTE — PROGRESS NOTES
Referring Physician  Teja Winter. MD Blanca    History of Present Illness  Patient is a 64-year-old  male who presents to the pulmonary clinic for follow-up visit.   He was recently seen in early August 2017 with evidence of mild dyspnea with exertion B-12 (VITAMIN B12) 1000 MCG Oral Tab Take  by mouth.  take 1 by Oral route in the morning Disp:  Rfl:    Ferrous Sulfate 325 (65 FE) MG Oral Tab Take 325 mg by mouth daily with breakfast.   Disp:  Rfl:    folic acid (FOLVITE) 874 MCG Oral Tab Take  by mouth [address]  8/17/2017  2:22 PM

## 2017-08-18 ENCOUNTER — TELEPHONE (OUTPATIENT)
Dept: PULMONOLOGY | Facility: CLINIC | Age: 72
End: 2017-08-18

## 2017-08-18 NOTE — TELEPHONE ENCOUNTER
I want to perform left ultrasound-guided thoracentesis this upcoming Wednesday afternoon at anytime after 1 PM.  I need to place an order for left ultrasound-guided thoracentesis which I cannot find on epic.

## 2017-08-18 NOTE — TELEPHONE ENCOUNTER
Edda from [facility] states pt is on the schedule for Wednesday August 23rd at 1:30 pt to arrive 1:00. Pt notified of time date and place of thoracentesis. Pt verbalized understanding.

## 2017-08-22 ENCOUNTER — LAB ENCOUNTER (OUTPATIENT)
Dept: LAB | Age: 72
End: 2017-08-22
Attending: INTERNAL MEDICINE
Payer: MEDICARE

## 2017-08-22 DIAGNOSIS — I10 HYPERTENSION, BENIGN: ICD-10-CM

## 2017-08-22 DIAGNOSIS — R31.0 GROSS HEMATURIA: ICD-10-CM

## 2017-08-22 LAB
ANION GAP SERPL CALC-SCNC: 9 MMOL/L (ref 0–18)
BACTERIA UR QL AUTO: NEGATIVE /HPF
BASOPHILS # BLD: 0 K/UL (ref 0–0.2)
BASOPHILS NFR BLD: 1 %
BUN SERPL-MCNC: 34 MG/DL (ref 8–20)
BUN/CREAT SERPL: 21.4 (ref 10–20)
CALCIUM SERPL-MCNC: 9.1 MG/DL (ref 8.5–10.5)
CHLORIDE SERPL-SCNC: 107 MMOL/L (ref 95–110)
CO2 SERPL-SCNC: 22 MMOL/L (ref 22–32)
CREAT SERPL-MCNC: 1.59 MG/DL (ref 0.5–1.5)
EOSINOPHIL # BLD: 0.1 K/UL (ref 0–0.7)
EOSINOPHIL NFR BLD: 2 %
ERYTHROCYTE [DISTWIDTH] IN BLOOD BY AUTOMATED COUNT: 14.2 % (ref 11–15)
GLUCOSE SERPL-MCNC: 72 MG/DL (ref 70–99)
HCT VFR BLD AUTO: 30 % (ref 41–52)
HGB BLD-MCNC: 9.9 G/DL (ref 13.5–17.5)
LYMPHOCYTES # BLD: 0.4 K/UL (ref 1–4)
LYMPHOCYTES NFR BLD: 8 %
MCH RBC QN AUTO: 31.3 PG (ref 27–32)
MCHC RBC AUTO-ENTMCNC: 32.9 G/DL (ref 32–37)
MCV RBC AUTO: 95.3 FL (ref 80–100)
MONOCYTES # BLD: 0.3 K/UL (ref 0–1)
MONOCYTES NFR BLD: 8 %
NEUTROPHILS # BLD AUTO: 3.7 K/UL (ref 1.8–7.7)
NEUTROPHILS NFR BLD: 81 %
OSMOLALITY UR CALC.SUM OF ELEC: 292 MOSM/KG (ref 275–295)
PLATELET # BLD AUTO: 200 K/UL (ref 140–400)
PMV BLD AUTO: 8.2 FL (ref 7.4–10.3)
POTASSIUM SERPL-SCNC: 4.9 MMOL/L (ref 3.3–5.1)
RBC # BLD AUTO: 3.15 M/UL (ref 4.5–5.9)
RBC #/AREA URNS AUTO: 0 /HPF
SODIUM SERPL-SCNC: 138 MMOL/L (ref 136–144)
WBC # BLD AUTO: 4.5 K/UL (ref 4–11)
WBC #/AREA URNS AUTO: <1 /HPF

## 2017-08-22 PROCEDURE — 81015 MICROSCOPIC EXAM OF URINE: CPT

## 2017-08-22 PROCEDURE — 80048 BASIC METABOLIC PNL TOTAL CA: CPT

## 2017-08-22 PROCEDURE — 87086 URINE CULTURE/COLONY COUNT: CPT

## 2017-08-22 PROCEDURE — 85025 COMPLETE CBC W/AUTO DIFF WBC: CPT

## 2017-08-22 PROCEDURE — 36415 COLL VENOUS BLD VENIPUNCTURE: CPT

## 2017-08-23 ENCOUNTER — HOSPITAL ENCOUNTER (OUTPATIENT)
Dept: GENERAL RADIOLOGY | Facility: HOSPITAL | Age: 72
Discharge: HOME | End: 2017-08-23
Attending: INTERNAL MEDICINE
Payer: MEDICARE

## 2017-08-23 ENCOUNTER — HOSPITAL ENCOUNTER (OUTPATIENT)
Dept: ULTRASOUND IMAGING | Facility: HOSPITAL | Age: 72
Discharge: HOME | End: 2017-08-23
Attending: INTERNAL MEDICINE
Payer: MEDICARE

## 2017-08-23 VITALS
WEIGHT: 205 LBS | HEIGHT: 73 IN | BODY MASS INDEX: 27.17 KG/M2 | SYSTOLIC BLOOD PRESSURE: 174 MMHG | HEART RATE: 60 BPM | OXYGEN SATURATION: 98 % | RESPIRATION RATE: 20 BRPM | DIASTOLIC BLOOD PRESSURE: 84 MMHG

## 2017-08-23 DIAGNOSIS — J90 PLEURAL EFFUSION: ICD-10-CM

## 2017-08-23 LAB
BASOPHILS NFR FLD: 0 %
COLOR FLD: YELLOW
EOSINOPHIL NFR FLD: 1 %
GLUCOSE PLEURAL FLUID: 96 MG/DL
LDH PLEURAL FLUID: 48 U/L
LYMPHOCYTES NFR FLD: 32 %
MONOCYTES NFR FLD: 16 %
NEUTROPHILS NFR FLD: 50 %
RBC # FLD: 18 /CUMM (ref ?–1)
TOTAL PROTEIN PLEURAL FLUID: 2.5 G/DL
TURBIDITY CSF QL: CLEAR
WBC # FLD: 100 /CUMM (ref ?–1)
WBC OTHER NFR FLD: 1 %

## 2017-08-23 PROCEDURE — 32555 ASPIRATE PLEURA W/ IMAGING: CPT | Performed by: INTERNAL MEDICINE

## 2017-08-23 PROCEDURE — 71010 XR CHEST AP PORTABLE  (CPT=71010): CPT | Performed by: INTERNAL MEDICINE

## 2017-08-23 PROCEDURE — 0W9B3ZZ DRAINAGE OF LEFT PLEURAL CAVITY, PERCUTANEOUS APPROACH: ICD-10-PCS | Performed by: RADIOLOGY

## 2017-08-23 NOTE — IMAGING NOTE
PT TO ULTRASOUND ROOM  SCANS COMPLETED BY fabby Menchaca us tech    HX TAKEN PROCEDURE EXPLAINED QUESTIONS ANSWERED.   PT CONSENTED AT 1315    DR TRUJILLO  HERE, SCANNING COMPLETED AWARE PT HAS BEEN OFF XERALTO X 3 DAYS      TIME OUT TAKEN [id number]      CHL

## 2017-08-24 LAB
CD10 CELLS NFR SPEC: 1 %
CD11C CELLS NFR SPEC: 6 %
CD14 CELLS NFR SPEC: 28 %
CD19 CELLS NFR SPEC: 8 %
CD19/CD10 CELLS: <1 %
CD20 CELLS NFR SPEC: 4 %
CD22 CELLS NFR SPEC: 4 %
CD23 CELLS NFR SPEC: 1 %
CD3 CELLS NFR SPEC: 87 %
CD3+CD4+ CELLS NFR SPEC: 70 %
CD3+CD4+ CELLS/CD3+CD8+ CLL SPEC: 3.9
CD3+CD8+ CELLS NFR SPEC: 18 %
CD45 CELLS NFR SPEC: 96 %
CD5 CELLS NFR SPEC: 88 %
CD5/CD19 CELLS: 1 %
CD56 CELLS NFR SPEC: 4 %
CD7 CELLS NFR SPEC: 76 %
CELL SURF KAPPA/LAMBDA RATIO: 0.5
CELL SURF LAMBDA LIGHT CHAIN: 2 %
CELL SURFACE KAPPA LIGHT CHAIN: 1 %
CYTOPLASMIC KAPPA CELLS: 4 %
CYTOPLASMIC LAMBDA CELLS: 2 %
FMC7 CELLS NFR SPEC: 1 %

## 2017-08-25 ENCOUNTER — PRIOR ORIGINAL RECORDS (OUTPATIENT)
Dept: OTHER | Age: 72
End: 2017-08-25

## 2017-08-25 ENCOUNTER — MYAURORA ACCOUNT LINK (OUTPATIENT)
Dept: OTHER | Age: 72
End: 2017-08-25

## 2017-09-01 ENCOUNTER — TELEPHONE (OUTPATIENT)
Dept: INTERNAL MEDICINE CLINIC | Facility: CLINIC | Age: 72
End: 2017-09-01

## 2017-09-01 RX ORDER — HYDRALAZINE HYDROCHLORIDE 25 MG/1
TABLET, FILM COATED ORAL
Qty: 540 TABLET | Refills: 3 | Status: SHIPPED | OUTPATIENT
Start: 2017-09-01 | End: 2018-08-26

## 2017-09-01 NOTE — TELEPHONE ENCOUNTER
Spoke to pt -  increased dose ;   He has since seen his APN and they are happy with his BPs and have continued this dose  Rx sent

## 2017-09-01 NOTE — TELEPHONE ENCOUNTER
JM SENDING CLARIFICATION/NEW PRESCRIPTION FOR     HYDRALAZINE     PT STATES HE IS TAKING TWO TABS - TID AND NOT     ONE TAB TID     IN PURPLE FOLDER

## 2017-09-11 ENCOUNTER — TELEPHONE (OUTPATIENT)
Dept: INTERNAL MEDICINE CLINIC | Facility: CLINIC | Age: 72
End: 2017-09-11

## 2017-09-11 DIAGNOSIS — J90 PLEURAL EFFUSION: Primary | ICD-10-CM

## 2017-09-12 ENCOUNTER — HOSPITAL ENCOUNTER (OUTPATIENT)
Dept: GENERAL RADIOLOGY | Age: 72
Discharge: HOME | End: 2017-09-12
Attending: INTERNAL MEDICINE
Payer: MEDICARE

## 2017-09-12 DIAGNOSIS — J90 PLEURAL EFFUSION: ICD-10-CM

## 2017-09-12 PROCEDURE — 71020 XR CHEST PA + LAT CHEST (CPT=71020): CPT | Performed by: INTERNAL MEDICINE

## 2017-09-13 ENCOUNTER — TELEPHONE (OUTPATIENT)
Dept: INTERNAL MEDICINE CLINIC | Facility: CLINIC | Age: 72
End: 2017-09-13

## 2017-09-13 ENCOUNTER — TELEPHONE (OUTPATIENT)
Dept: PULMONOLOGY | Facility: CLINIC | Age: 72
End: 2017-09-13

## 2017-09-13 NOTE — TELEPHONE ENCOUNTER
I advised patient of recurrent effusion and advised him to set up appointment c Dr Christiano Mccann - he will do so

## 2017-09-13 NOTE — TELEPHONE ENCOUNTER
I spoke to the patient about doing another thoracentesis which he is agreeable with. Can we call ultrasound and arrange for thoracentesis Tuesday afternoon preferably sometime between 130 and 3:00. Once you do have a time for the patient let him know to discontinue his Xarelto 72 hours prior to the procedure.

## 2017-09-13 NOTE — TELEPHONE ENCOUNTER
Pt stts he had CXR yesterday & Dr. Terra Le told him that fluid is back in lungs again. He requests appt asap. Explained will d/w Dr. Nunu Nunez & f/u. Pt verbalized understanding. He stts Dr. Nunu Nunez performed Thoracentesis 3 wks ago. Dr. Nunu Nunez- pls advise. Thanks.

## 2017-09-14 DIAGNOSIS — J90 PLEURAL EFFUSION, LEFT: Primary | ICD-10-CM

## 2017-09-14 NOTE — TELEPHONE ENCOUNTER
Dr. Beata Banks- pls advise if Left sided Us-Guided Thoracentesis for pleural effusion, PT/INR/PTT, & post procedure CXR? Thanks.

## 2017-09-14 NOTE — TELEPHONE ENCOUNTER
Ivan Bañuelos of Dr. Mark Montez orders. She verbalized understanding. Jaky thrasher she will call back re: procedure time.

## 2017-09-14 NOTE — TELEPHONE ENCOUNTER
Notified pt of Dr. Salena Burton orders. He was given arrival and procedure date & time as well as Thoracentesis instructions. Pt verbalized understanding. He stts he will stop Xarelto 3 days prior to Thoracentesis.

## 2017-09-14 NOTE — TELEPHONE ENCOUNTER
We will need to schedule patient for left ultrasound-guided thoracentesis. INR not needed. I will order a postprocedural chest x-ray once I do procedure myself. However I am trying to put an order for ultrasound-guided thoracentesis and only option coming up his right-sided ultrasound guided thoracentesis. Are you able to put in left-sided thoracentesis?

## 2017-09-19 ENCOUNTER — HOSPITAL ENCOUNTER (OUTPATIENT)
Dept: GENERAL RADIOLOGY | Facility: HOSPITAL | Age: 72
Discharge: HOME | End: 2017-09-19
Attending: INTERNAL MEDICINE
Payer: MEDICARE

## 2017-09-19 ENCOUNTER — HOSPITAL ENCOUNTER (OUTPATIENT)
Dept: ULTRASOUND IMAGING | Facility: HOSPITAL | Age: 72
Discharge: HOME | End: 2017-09-19
Attending: INTERNAL MEDICINE
Payer: MEDICARE

## 2017-09-19 VITALS
HEART RATE: 64 BPM | WEIGHT: 200 LBS | DIASTOLIC BLOOD PRESSURE: 93 MMHG | RESPIRATION RATE: 20 BRPM | BODY MASS INDEX: 25.67 KG/M2 | SYSTOLIC BLOOD PRESSURE: 184 MMHG | HEIGHT: 74 IN | OXYGEN SATURATION: 96 %

## 2017-09-19 DIAGNOSIS — J90 PLEURAL EFFUSION, LEFT: ICD-10-CM

## 2017-09-19 DIAGNOSIS — R52 PAIN: ICD-10-CM

## 2017-09-19 LAB
BASOPHILS NFR FLD: 0 %
COLOR FLD: YELLOW
EOSINOPHIL NFR FLD: 0 %
GLUCOSE PLEURAL FLUID: 89 MG/DL
LDH PLEURAL FLUID: 47 U/L
LYMPHOCYTES NFR FLD: 28 %
MONOCYTES NFR FLD: 29 %
NEUTROPHILS NFR FLD: 42 %
RBC # FLD: 8 /CUMM (ref ?–1)
TOTAL PROTEIN PLEURAL FLUID: 2.5 G/DL
TURBIDITY CSF QL: CLEAR
WBC # FLD: 78 /CUMM (ref ?–1)
WBC OTHER NFR FLD: 1 %

## 2017-09-19 PROCEDURE — 32555 ASPIRATE PLEURA W/ IMAGING: CPT | Performed by: INTERNAL MEDICINE

## 2017-09-19 PROCEDURE — 71010 XR CHEST AP/PA (1 VIEW) (CPT=71010): CPT | Performed by: INTERNAL MEDICINE

## 2017-09-19 NOTE — IMAGING NOTE
PT TO ULTRASOUND ROOM  SCANS COMPLETED BY  CRISTOBAL MCFARLAND US TECH CELESTINA [address] [facility]      HX TAKEN PROCEDURE EXPLAINED QUESTIONS ANSWERED.   PT CONSENTED AT  [address] [facility]

## 2017-10-12 ENCOUNTER — HOSPITAL ENCOUNTER (OUTPATIENT)
Dept: GENERAL RADIOLOGY | Facility: HOSPITAL | Age: 72
Discharge: HOME | End: 2017-10-12
Attending: INTERNAL MEDICINE
Payer: MEDICARE

## 2017-10-12 DIAGNOSIS — J90 RECURRENT LEFT PLEURAL EFFUSION: ICD-10-CM

## 2017-10-12 PROCEDURE — 71020 XR CHEST PA + LAT CHEST (CPT=71020): CPT | Performed by: INTERNAL MEDICINE

## 2017-10-18 ENCOUNTER — HOSPITAL ENCOUNTER (OUTPATIENT)
Dept: CT IMAGING | Facility: HOSPITAL | Age: 72
Discharge: HOME | End: 2017-10-18
Attending: INTERNAL MEDICINE
Payer: MEDICARE

## 2017-10-18 ENCOUNTER — HOSPITAL ENCOUNTER (OUTPATIENT)
Dept: ULTRASOUND IMAGING | Facility: HOSPITAL | Age: 72
Discharge: HOME | End: 2017-10-18
Attending: INTERNAL MEDICINE
Payer: MEDICARE

## 2017-10-18 VITALS
WEIGHT: 200 LBS | RESPIRATION RATE: 16 BRPM | SYSTOLIC BLOOD PRESSURE: 151 MMHG | OXYGEN SATURATION: 99 % | HEART RATE: 67 BPM | HEIGHT: 72 IN | DIASTOLIC BLOOD PRESSURE: 79 MMHG | BODY MASS INDEX: 27.09 KG/M2

## 2017-10-18 DIAGNOSIS — J90 PLEURAL EFFUSION: ICD-10-CM

## 2017-10-18 DIAGNOSIS — J90 RECURRENT LEFT PLEURAL EFFUSION: ICD-10-CM

## 2017-10-18 PROCEDURE — 89051 BODY FLUID CELL COUNT: CPT | Performed by: INTERNAL MEDICINE

## 2017-10-18 PROCEDURE — 84311 SPECTROPHOTOMETRY: CPT | Performed by: INTERNAL MEDICINE

## 2017-10-18 PROCEDURE — 82150 ASSAY OF AMYLASE: CPT | Performed by: INTERNAL MEDICINE

## 2017-10-18 PROCEDURE — 89050 BODY FLUID CELL COUNT: CPT | Performed by: INTERNAL MEDICINE

## 2017-10-18 PROCEDURE — 88112 CYTOPATH CELL ENHANCE TECH: CPT | Performed by: INTERNAL MEDICINE

## 2017-10-18 PROCEDURE — 82945 GLUCOSE OTHER FLUID: CPT | Performed by: INTERNAL MEDICINE

## 2017-10-18 PROCEDURE — 87070 CULTURE OTHR SPECIMN AEROBIC: CPT | Performed by: INTERNAL MEDICINE

## 2017-10-18 PROCEDURE — 84157 ASSAY OF PROTEIN OTHER: CPT | Performed by: INTERNAL MEDICINE

## 2017-10-18 PROCEDURE — 83615 LACTATE (LD) (LDH) ENZYME: CPT | Performed by: INTERNAL MEDICINE

## 2017-10-18 PROCEDURE — 87205 SMEAR GRAM STAIN: CPT | Performed by: INTERNAL MEDICINE

## 2017-10-18 PROCEDURE — 32555 ASPIRATE PLEURA W/ IMAGING: CPT | Performed by: INTERNAL MEDICINE

## 2017-10-18 PROCEDURE — 71250 CT THORAX DX C-: CPT | Performed by: INTERNAL MEDICINE

## 2017-10-18 NOTE — IMAGING NOTE
PATIENT ARRIVED AMBULATING TO DEPARTMENT FOR THORACENTESIS.  IMAGES DONE AND FLUID LOCATION VERIFIED.  DR OH PRESENT, IMAGES REVIEWED SITE VERIFIED AND PREP WITH CHLORAPREP AND 1 % LIDOCAINE.     3130 SITE ACCESSED WITH 6 F SAFE-T-CENTESIS CATH

## 2017-10-25 PROCEDURE — 86038 ANTINUCLEAR ANTIBODIES: CPT | Performed by: INTERNAL MEDICINE

## 2017-10-25 PROCEDURE — 86200 CCP ANTIBODY: CPT | Performed by: INTERNAL MEDICINE

## 2017-10-25 PROCEDURE — 86225 DNA ANTIBODY NATIVE: CPT | Performed by: INTERNAL MEDICINE

## 2017-10-25 PROCEDURE — 83516 IMMUNOASSAY NONANTIBODY: CPT | Performed by: INTERNAL MEDICINE

## 2017-10-25 PROCEDURE — 36415 COLL VENOUS BLD VENIPUNCTURE: CPT | Performed by: INTERNAL MEDICINE

## 2017-10-31 ENCOUNTER — TELEPHONE (OUTPATIENT)
Dept: SURGERY | Facility: CLINIC | Age: 72
End: 2017-10-31

## 2017-10-31 ENCOUNTER — HOSPITAL ENCOUNTER (EMERGENCY)
Facility: HOSPITAL | Age: 72
Discharge: HOME | End: 2017-10-31
Attending: EMERGENCY MEDICINE
Payer: MEDICARE

## 2017-10-31 VITALS
OXYGEN SATURATION: 98 % | SYSTOLIC BLOOD PRESSURE: 177 MMHG | HEIGHT: 73 IN | DIASTOLIC BLOOD PRESSURE: 101 MMHG | BODY MASS INDEX: 26.51 KG/M2 | WEIGHT: 200 LBS | RESPIRATION RATE: 20 BRPM | HEART RATE: 65 BPM | TEMPERATURE: 96 F

## 2017-10-31 DIAGNOSIS — N30.01 ACUTE CYSTITIS WITH HEMATURIA: Primary | ICD-10-CM

## 2017-10-31 PROCEDURE — 96366 THER/PROPH/DIAG IV INF ADDON: CPT

## 2017-10-31 PROCEDURE — 99284 EMERGENCY DEPT VISIT MOD MDM: CPT

## 2017-10-31 PROCEDURE — 85025 COMPLETE CBC W/AUTO DIFF WBC: CPT | Performed by: EMERGENCY MEDICINE

## 2017-10-31 PROCEDURE — 80048 BASIC METABOLIC PNL TOTAL CA: CPT | Performed by: EMERGENCY MEDICINE

## 2017-10-31 PROCEDURE — 81001 URINALYSIS AUTO W/SCOPE: CPT | Performed by: EMERGENCY MEDICINE

## 2017-10-31 PROCEDURE — 87088 URINE BACTERIA CULTURE: CPT | Performed by: EMERGENCY MEDICINE

## 2017-10-31 PROCEDURE — 96365 THER/PROPH/DIAG IV INF INIT: CPT

## 2017-10-31 PROCEDURE — 87086 URINE CULTURE/COLONY COUNT: CPT | Performed by: EMERGENCY MEDICINE

## 2017-10-31 PROCEDURE — 87186 SC STD MICRODIL/AGAR DIL: CPT | Performed by: EMERGENCY MEDICINE

## 2017-10-31 RX ORDER — SODIUM CHLORIDE 9 MG/ML
125 INJECTION, SOLUTION INTRAVENOUS CONTINUOUS
Status: DISCONTINUED | OUTPATIENT
Start: 2017-10-31 | End: 2017-10-31

## 2017-10-31 RX ORDER — LEVOFLOXACIN 500 MG/1
500 TABLET, FILM COATED ORAL DAILY
Qty: 9 TABLET | Refills: 0 | Status: SHIPPED | OUTPATIENT
Start: 2017-10-31 | End: 2017-11-08 | Stop reason: ALTCHOICE

## 2017-10-31 RX ORDER — LEVOFLOXACIN 5 MG/ML
750 INJECTION, SOLUTION INTRAVENOUS ONCE
Status: COMPLETED | OUTPATIENT
Start: 2017-10-31 | End: 2017-10-31

## 2017-10-31 NOTE — TELEPHONE ENCOUNTER
BRANDON came to me and asked that I print his LOV note for this pt since he got a call from the ER and he also gave verbal orders to call the pt and offer him an appt in 7-10 days for an ER f/u and said that I can use a 12 noon slot if no o/v slots available.

## 2017-10-31 NOTE — PROGRESS NOTES
Levaquin (levofloxacin) 500 mg IV once was ordered for Ferol Frees. Body mass index is 26.39 kg/m².   Wt Readings from Last 6 Encounters:  10/31/17 : 200 lb  10/25/17 : 200 lb  10/18/17 : 200 lb  10/03/17 : 205 lb  09/19/17 : 200 lb  08/23/17 : 205 lb

## 2017-10-31 NOTE — ED PROVIDER NOTES
Patient Seen in: [facility] AND [facility]    History   Patient presents with:  Urinary Symptoms (urologic)    Stated Complaint: Hematuria    HPI    Patient presents with starting last night at approximately 12:30 in the morning he got up to essential hypertension    • UTI (urinary tract infection) 08/2017       Past Surgical History:  November 2015: ANGIOGRAM  1994: CATH PERCUTANEOUS  TRANSLUMINAL CORONARY ANGIO*      Comment: per NextGen:  \"Ischemic heart disease\";                 \"Managem MCG Oral Tab,  Take  by mouth.  take 1 tablet (0.4MG)  by oral route  every day       Family History   Problem Relation Age of Onset   • Heart Disease Father      per NextGen:  \"Premature CAD\"   • Hypertension Father    • Other Cassia Dy Father 80     per N auscultation bilaterally with good effort. No wheezes, ronchi, or rales. Abdomen:  Soft, non-tender, non-distended. No masses, no hepato-splenomegaly. Negative McBurney's point tenderness. Musculoskeletal:  Good muscle tone.   Skin:  Warm, dry, well pe order CBC WITH DIFFERENTIAL WITH PLATELET.   Procedure                               Abnormality         Status                     ---------                               -----------         ------                     CBC W/ DIFFERENTIAL[[id number]]

## 2017-10-31 NOTE — ED NOTES
Patient with bright red blood in urine since last night. Hx of this in July. On xarelto for afib. Denies any pain, denies any clots. Urine sent for analysis.

## 2017-11-01 NOTE — TELEPHONE ENCOUNTER
Pt. States that he is supposed to see [facility] for hosp f/up appt in a few days. Please call to schedule.

## 2017-11-02 NOTE — TELEPHONE ENCOUNTER
Spoke with patient scheduled to see Dr. Smart April 11/7/17 at 12 noon patient verbalized understanding.

## 2017-11-02 NOTE — TELEPHONE ENCOUNTER
Pt called stating pt has not heard back from the nurse. Pt needs to be seen for a er follow up. Call pt to advise when.

## 2017-11-07 ENCOUNTER — HOSPITAL ENCOUNTER (OUTPATIENT)
Dept: GENERAL RADIOLOGY | Facility: HOSPITAL | Age: 72
Discharge: HOME | End: 2017-11-07
Attending: INTERNAL MEDICINE | Admitting: UROLOGY
Payer: MEDICARE

## 2017-11-07 ENCOUNTER — OFFICE VISIT (OUTPATIENT)
Dept: SURGERY | Facility: CLINIC | Age: 72
End: 2017-11-07

## 2017-11-07 ENCOUNTER — LAB ENCOUNTER (OUTPATIENT)
Dept: LAB | Facility: HOSPITAL | Age: 72
End: 2017-11-07
Attending: INTERNAL MEDICINE
Payer: MEDICARE

## 2017-11-07 VITALS
TEMPERATURE: 98 F | WEIGHT: 200 LBS | HEIGHT: 73 IN | DIASTOLIC BLOOD PRESSURE: 80 MMHG | BODY MASS INDEX: 26.51 KG/M2 | SYSTOLIC BLOOD PRESSURE: 142 MMHG

## 2017-11-07 DIAGNOSIS — N30.01 ACUTE CYSTITIS WITH HEMATURIA: ICD-10-CM

## 2017-11-07 DIAGNOSIS — J90 EXUDATIVE PLEURISY: ICD-10-CM

## 2017-11-07 DIAGNOSIS — N30.01 ACUTE CYSTITIS WITH HEMATURIA: Primary | ICD-10-CM

## 2017-11-07 PROCEDURE — 99214 OFFICE O/P EST MOD 30 MIN: CPT | Performed by: UROLOGY

## 2017-11-07 PROCEDURE — 71020 XR CHEST PA + LAT CHEST (CPT=71020): CPT | Performed by: INTERNAL MEDICINE

## 2017-11-07 PROCEDURE — G0463 HOSPITAL OUTPT CLINIC VISIT: HCPCS | Performed by: UROLOGY

## 2017-11-07 RX ORDER — CEPHALEXIN 500 MG/1
500 CAPSULE ORAL 2 TIMES DAILY
Status: ON HOLD | COMMUNITY
End: 2017-11-14 | Stop reason: ALTCHOICE

## 2017-11-07 NOTE — PROGRESS NOTES
[address] Patient Status:  Outpatient    1945 MRN [id number]   Location [address] [place] [address] Attending Anthony Galo.  [address] Day # 0 PCP Bob Ohara.  MD Santa Rios is a 66 y Disp:  Rfl:    TAMSULOSIN HCL 0.4 MG Oral Cap TAKE 1 CAPSULE (0.4 MG TOTAL) BY MOUTH DAILY. TAKE 1/2 HOUR FOLLOWING THE SAME MEAL EACH DAY Disp: 30 capsule Rfl: 11   Labetalol HCl 200 MG Oral Tab Take 200 mg by mouth 2 (two) times daily.  Disp:  Rfl:    ind Hr TAKE ONE TABLET BY MOUTH EVERY DAY Disp: 90 tablet Rfl: 3   SIMVASTATIN 40 MG Oral Tab TAKE ONE TABLET BY MOUTH EVERY DAY Disp: 90 tablet Rfl: 3   PANTOPRAZOLE SODIUM 40 MG Oral Tab EC TAKE ONE TABLET BY MOUTH EVERY DAY Disp: 90 tablet Rfl: 3   DUTASTER 49-year-old white male who underwent urgent hemorrhoidectomy secondary to chronic hemorrhoids March 5857 complicated postop urinary retention this was complicated by continued rectal bleeding secondary anticoagulation.   Prior to the episode patient did hav

## 2017-11-08 ENCOUNTER — APPOINTMENT (OUTPATIENT)
Dept: LAB | Facility: HOSPITAL | Age: 72
End: 2017-11-08
Attending: INTERNAL MEDICINE
Payer: MEDICARE

## 2017-11-08 DIAGNOSIS — N30.01 ACUTE CYSTITIS WITH HEMATURIA: ICD-10-CM

## 2017-11-08 PROCEDURE — 87086 URINE CULTURE/COLONY COUNT: CPT

## 2017-11-08 PROCEDURE — 81001 URINALYSIS AUTO W/SCOPE: CPT

## 2017-11-09 ENCOUNTER — PRIOR ORIGINAL RECORDS (OUTPATIENT)
Dept: OTHER | Age: 72
End: 2017-11-09

## 2017-11-10 RX ORDER — SODIUM CHLORIDE 9 MG/ML
INJECTION, SOLUTION INTRAVENOUS CONTINUOUS
Status: CANCELLED | OUTPATIENT
Start: 2017-11-13

## 2017-11-13 ENCOUNTER — HOSPITAL ENCOUNTER (OUTPATIENT)
Dept: INTERVENTIONAL RADIOLOGY/VASCULAR | Facility: HOSPITAL | Age: 72
Discharge: HOME | End: 2017-11-13
Attending: INTERNAL MEDICINE | Admitting: INTERNAL MEDICINE
Payer: MEDICARE

## 2017-11-13 DIAGNOSIS — J90 PLEURAL EFFUSION: ICD-10-CM

## 2017-11-14 ENCOUNTER — HOSPITAL ENCOUNTER (OUTPATIENT)
Dept: INTERVENTIONAL RADIOLOGY/VASCULAR | Facility: HOSPITAL | Age: 72
Discharge: HOME | End: 2017-11-14
Attending: INTERNAL MEDICINE | Admitting: INTERNAL MEDICINE
Payer: MEDICARE

## 2017-11-14 VITALS
BODY MASS INDEX: 26 KG/M2 | SYSTOLIC BLOOD PRESSURE: 166 MMHG | DIASTOLIC BLOOD PRESSURE: 88 MMHG | RESPIRATION RATE: 21 BRPM | HEART RATE: 61 BPM | WEIGHT: 200 LBS | OXYGEN SATURATION: 98 %

## 2017-11-14 PROCEDURE — 99152 MOD SED SAME PHYS/QHP 5/>YRS: CPT

## 2017-11-14 PROCEDURE — 32550 INSERT PLEURAL CATH: CPT

## 2017-11-14 PROCEDURE — 0W9930Z DRAINAGE OF RIGHT PLEURAL CAVITY WITH DRAINAGE DEVICE, PERCUTANEOUS APPROACH: ICD-10-PCS | Performed by: RADIOLOGY

## 2017-11-14 PROCEDURE — 75989 ABSCESS DRAINAGE UNDER X-RAY: CPT

## 2017-11-14 RX ORDER — SODIUM CHLORIDE 9 MG/ML
INJECTION, SOLUTION INTRAVENOUS
Status: COMPLETED
Start: 2017-11-14 | End: 2017-11-14

## 2017-11-14 RX ORDER — MIDAZOLAM HYDROCHLORIDE 1 MG/ML
INJECTION INTRAMUSCULAR; INTRAVENOUS
Status: COMPLETED
Start: 2017-11-14 | End: 2017-11-14

## 2017-11-14 RX ORDER — LIDOCAINE HYDROCHLORIDE 20 MG/ML
INJECTION, SOLUTION EPIDURAL; INFILTRATION; INTRACAUDAL; PERINEURAL
Status: COMPLETED
Start: 2017-11-14 | End: 2017-11-14

## 2017-11-14 RX ORDER — SODIUM CHLORIDE 9 MG/ML
INJECTION, SOLUTION INTRAVENOUS CONTINUOUS
Status: DISCONTINUED | OUTPATIENT
Start: 2017-11-14 | End: 2017-11-14

## 2017-11-14 RX ADMIN — SODIUM CHLORIDE: 9 INJECTION, SOLUTION INTRAVENOUS at 13:45:00

## 2017-11-14 NOTE — PROGRESS NOTES
[facility]  Progress Note    Austin Dahl Patient Status:  Outpatient in a Bed    1945 MRN [id number]   Location [facility] Attending Lisset Lovelace, [address] [facility] [address] Day # 0 PCP Fritz Curling.  MD Blanca

## 2017-11-14 NOTE — PROGRESS NOTES
1800cc removed from left chest Pleurx drain. Spent 25 minutes teaching patient on drain and dressing care.

## 2017-11-22 NOTE — TELEPHONE ENCOUNTER
Pharm calling to inform that they Need a New Rx for Finisteride 5mg Med, as insur no longer covers Dutasteride.

## 2017-11-24 NOTE — TELEPHONE ENCOUNTER
Dr. Roxanne Gutiérrez, pt [address] [facility] 11/7/17 pt pharmacy is calling asking to change the dutasteride to Finasteride because of pt insurance if you agree please review and sign med, thank you! I copied and pasted part of your last note below.     PAST MEDICAL/SURGICAL:  Past m

## 2017-11-27 RX ORDER — FINASTERIDE 5 MG/1
5 TABLET, FILM COATED ORAL DAILY
Qty: 90 TABLET | Refills: 3 | Status: SHIPPED | OUTPATIENT
Start: 2017-11-27 | End: 2018-11-16

## 2017-11-28 ENCOUNTER — TELEPHONE (OUTPATIENT)
Dept: SURGERY | Facility: CLINIC | Age: 72
End: 2017-11-28

## 2017-11-29 NOTE — TELEPHONE ENCOUNTER
Phoned pharmacy and spoke with Nalini Martin. She states she received a new prescription and this has been taken care of already.

## 2017-12-04 ENCOUNTER — TELEPHONE (OUTPATIENT)
Dept: INTERNAL MEDICINE CLINIC | Facility: CLINIC | Age: 72
End: 2017-12-04

## 2017-12-04 NOTE — TELEPHONE ENCOUNTER
Call him - tell him I was reviewing his chart and I note that a urine culture was ordered by someone and it

## 2017-12-04 NOTE — TELEPHONE ENCOUNTER
To Dr Rios----Patient's urine culture on 10/31/17 showed ecoli and according to chart patient was prescribed keflex 500 mg BID x10 days. Patient had another ua and culture done on 11/8/17 and this urine culture came back negative.     Are these the urine

## 2017-12-04 NOTE — TELEPHONE ENCOUNTER
Tell him I was reviewing his record and I note a urine culture done about 1 month ago that showed a bladder infection. Was this treated with antibiotics? If not give him Keflex 500 mg bid for 5 days and repeat urine culture in about 3 weeks.

## 2017-12-13 ENCOUNTER — LAB ENCOUNTER (OUTPATIENT)
Dept: LAB | Age: 72
End: 2017-12-13
Attending: INTERNAL MEDICINE
Payer: MEDICARE

## 2017-12-13 ENCOUNTER — OFFICE VISIT (OUTPATIENT)
Dept: INTERNAL MEDICINE CLINIC | Facility: CLINIC | Age: 72
End: 2017-12-13

## 2017-12-13 VITALS
HEIGHT: 73 IN | BODY MASS INDEX: 28.36 KG/M2 | WEIGHT: 214 LBS | DIASTOLIC BLOOD PRESSURE: 80 MMHG | HEART RATE: 72 BPM | TEMPERATURE: 97 F | SYSTOLIC BLOOD PRESSURE: 154 MMHG

## 2017-12-13 DIAGNOSIS — E61.1 IRON DEFICIENCY: ICD-10-CM

## 2017-12-13 DIAGNOSIS — I10 HYPERTENSION, BENIGN: ICD-10-CM

## 2017-12-13 DIAGNOSIS — D50.9 IRON DEFICIENCY ANEMIA, UNSPECIFIED IRON DEFICIENCY ANEMIA TYPE: ICD-10-CM

## 2017-12-13 DIAGNOSIS — J90 PLEURAL EFFUSION: Primary | ICD-10-CM

## 2017-12-13 DIAGNOSIS — I48.91 ATRIAL FIBRILLATION, NEW ONSET (HCC): ICD-10-CM

## 2017-12-13 DIAGNOSIS — D63.8 ANEMIA IN OTHER CHRONIC DISEASES CLASSIFIED ELSEWHERE: ICD-10-CM

## 2017-12-13 PROCEDURE — 82728 ASSAY OF FERRITIN: CPT

## 2017-12-13 PROCEDURE — 80053 COMPREHEN METABOLIC PANEL: CPT

## 2017-12-13 PROCEDURE — 99214 OFFICE O/P EST MOD 30 MIN: CPT | Performed by: INTERNAL MEDICINE

## 2017-12-13 PROCEDURE — G0463 HOSPITAL OUTPT CLINIC VISIT: HCPCS | Performed by: INTERNAL MEDICINE

## 2017-12-13 PROCEDURE — 36415 COLL VENOUS BLD VENIPUNCTURE: CPT

## 2017-12-13 PROCEDURE — 82607 VITAMIN B-12: CPT

## 2017-12-13 PROCEDURE — 85025 COMPLETE CBC W/AUTO DIFF WBC: CPT

## 2017-12-13 NOTE — PROGRESS NOTES
Robert Bains is a 74year old male. HPI - he is draining his left pleural effusion which is recurrent and without apparent etiology. He is draining about 1.5 liters every 4 days. He eats well, energy is good.  His breathing is a little impaired with retention Disp: 45 tablet Rfl: 3   ISOSORBIDE MONONITRATE ER 30 MG Oral Tablet 24 Hr TAKE ONE TABLET BY MOUTH EVERY DAY Disp: 90 tablet Rfl: 3   SIMVASTATIN 40 MG Oral Tab TAKE ONE TABLET BY MOUTH EVERY DAY Disp: 90 tablet Rfl: 3   PANTOPRAZOLE SODIUM 40 M \"Ischemic heart disease\";  \"Management:  PTCA (1994)\"   • Kidney disease 07/05/2013   • Lipid screening 05-    per NextGen Order Module   • Neutropenia [facility])    • Occlusion and stenosis of carotid artery 12/2/2013   • Recent change in frequency understanding of these issues and agrees to the plan. The patient is asked to return in 1 month.

## 2017-12-14 ENCOUNTER — TELEPHONE (OUTPATIENT)
Dept: INTERNAL MEDICINE CLINIC | Facility: CLINIC | Age: 72
End: 2017-12-14

## 2017-12-14 NOTE — TELEPHONE ENCOUNTER
Tell him his labs look good - kidney function has improved from creatinine of 1.6 down to 1.3.     Anemia about the same c Hb 9.9

## 2017-12-18 ENCOUNTER — TELEPHONE (OUTPATIENT)
Dept: INTERNAL MEDICINE CLINIC | Facility: CLINIC | Age: 72
End: 2017-12-18

## 2017-12-18 NOTE — TELEPHONE ENCOUNTER
Tell him recent labs are good   - anemia about the same. Kidney function good.      B12 and iron are normal.

## 2017-12-18 NOTE — TELEPHONE ENCOUNTER
Pt notified / DR COUCH  - recent labs are good  Anemia about the same    Kidney function good   B12 and iron normal

## 2017-12-22 ENCOUNTER — TELEPHONE (OUTPATIENT)
Dept: INTERNAL MEDICINE CLINIC | Facility: CLINIC | Age: 72
End: 2017-12-22

## 2017-12-22 RX ORDER — DOCUSATE SODIUM 100 MG/1
100 CAPSULE, LIQUID FILLED ORAL 3 TIMES DAILY
Qty: 90 CAPSULE | Refills: 11 | Status: SHIPPED | OUTPATIENT
Start: 2017-12-22 | End: 2018-04-16

## 2017-12-22 NOTE — TELEPHONE ENCOUNTER
Spoke with patient. He reports he now takes DOK stool softener TID. To Dr. Pancho Flood to advise on pended RF.

## 2017-12-27 ENCOUNTER — TELEPHONE (OUTPATIENT)
Dept: INTERNAL MEDICINE CLINIC | Facility: CLINIC | Age: 72
End: 2017-12-27

## 2017-12-27 NOTE — TELEPHONE ENCOUNTER
Shannon Mesa,     Thanks for seeing Obed Marshall at my request. I spoke with him today and just to let you know he is open to the concept of thoracic surgery if needed as a last resort.      Thad Velasco

## 2017-12-27 NOTE — TELEPHONE ENCOUNTER
I did d/w [facility] - he is doing about the same.     He states he will consider chest surgery if it is necessary

## 2017-12-29 ENCOUNTER — PRIOR ORIGINAL RECORDS (OUTPATIENT)
Dept: OTHER | Age: 72
End: 2017-12-29

## 2018-01-02 ENCOUNTER — PRIOR ORIGINAL RECORDS (OUTPATIENT)
Dept: OTHER | Age: 73
End: 2018-01-02

## 2018-01-05 ENCOUNTER — LAB ENCOUNTER (OUTPATIENT)
Dept: LAB | Facility: HOSPITAL | Age: 73
End: 2018-01-05
Attending: INTERNAL MEDICINE
Payer: MEDICARE

## 2018-01-05 ENCOUNTER — PRIOR ORIGINAL RECORDS (OUTPATIENT)
Dept: OTHER | Age: 73
End: 2018-01-05

## 2018-01-05 DIAGNOSIS — I31.3 PERICARDIAL EFFUSION: ICD-10-CM

## 2018-01-05 DIAGNOSIS — J90 PLEURAL EFFUSION: ICD-10-CM

## 2018-01-05 LAB
CRP SERPL-MCNC: 2.8 MG/DL (ref 0–0.9)
ERYTHROCYTE [SEDIMENTATION RATE] IN BLOOD: 89 MM/HR (ref 0–20)
RHEUMATOID FACT SER QL: <5 IU/ML

## 2018-01-05 PROCEDURE — 36415 COLL VENOUS BLD VENIPUNCTURE: CPT

## 2018-01-05 PROCEDURE — 86140 C-REACTIVE PROTEIN: CPT

## 2018-01-05 PROCEDURE — 86431 RHEUMATOID FACTOR QUANT: CPT

## 2018-01-05 PROCEDURE — 85652 RBC SED RATE AUTOMATED: CPT

## 2018-01-05 PROCEDURE — 86039 ANTINUCLEAR ANTIBODIES (ANA): CPT

## 2018-01-05 PROCEDURE — 86038 ANTINUCLEAR ANTIBODIES: CPT

## 2018-01-08 LAB
ESR (SED RATE): 89 MM/HR
HSCRP(TYPE Y): 2.8 YES
NUCLEAR IGG TITR SER IF: POSITIVE {TITER}

## 2018-01-09 LAB — ANA NUCLEOLAR TITR SER IF: 160 {TITER}

## 2018-01-11 ENCOUNTER — PRIOR ORIGINAL RECORDS (OUTPATIENT)
Dept: OTHER | Age: 73
End: 2018-01-11

## 2018-01-17 ENCOUNTER — OFFICE VISIT (OUTPATIENT)
Dept: INTERNAL MEDICINE CLINIC | Facility: CLINIC | Age: 73
End: 2018-01-17

## 2018-01-17 VITALS
DIASTOLIC BLOOD PRESSURE: 72 MMHG | TEMPERATURE: 98 F | OXYGEN SATURATION: 97 % | WEIGHT: 206.81 LBS | BODY MASS INDEX: 27.41 KG/M2 | HEIGHT: 73 IN | HEART RATE: 65 BPM | SYSTOLIC BLOOD PRESSURE: 142 MMHG

## 2018-01-17 DIAGNOSIS — I10 HYPERTENSION, BENIGN: ICD-10-CM

## 2018-01-17 DIAGNOSIS — I48.91 ATRIAL FIBRILLATION, NEW ONSET (HCC): ICD-10-CM

## 2018-01-17 DIAGNOSIS — J90 PLEURAL EFFUSION: Primary | ICD-10-CM

## 2018-01-17 DIAGNOSIS — I31.3 PERICARDIAL EFFUSION: ICD-10-CM

## 2018-01-17 PROBLEM — I31.39 PERICARDIAL EFFUSION: Status: ACTIVE | Noted: 2018-01-17

## 2018-01-17 PROCEDURE — 99214 OFFICE O/P EST MOD 30 MIN: CPT | Performed by: INTERNAL MEDICINE

## 2018-01-17 PROCEDURE — G0463 HOSPITAL OUTPT CLINIC VISIT: HCPCS | Performed by: INTERNAL MEDICINE

## 2018-01-17 NOTE — PROGRESS NOTES
Breonna Villatoro is a 74year old male.   HPI:   He has an unexplained left pleural effusion and now a pericardial effusion detected on recent CT of chest and echocardiogram - he continues to drain about 1 liter of pleural fluid via Pleurex catheter every 3 d TAMSULOSIN HCL 0.4 MG Oral Cap TAKE 1 CAPSULE (0.4 MG TOTAL) BY MOUTH DAILY. TAKE 1/2 HOUR FOLLOWING THE SAME MEAL EACH DAY Disp: 30 capsule Rfl: 11   Labetalol HCl 200 MG Oral Tab Take 200 mg by mouth 2 (two) times daily.  Disp:  Rfl:    indapamide 1.25 08/07/2017       • Hypertension, benign 5/28/2013   • Hyposmolality and/or hyponatremia 8/14/2012   • Iron deficiency anemia 7/16/2013   • Ischemic heart disease     per NextGen:  \"Ischemic heart disease\";  \"Management:  PTCA (1994)\"   • Kidney diseas to see Dr Fransisco Manning today    3. Hypertension, benign  Near goal, same meds    4. Atrial fibrillation, new onset [facility])  He will need to hold Xarelto and ASA prior to any surgical procedure.     The patient indicates understanding of these issues and agrees to

## 2018-01-18 ENCOUNTER — TELEPHONE (OUTPATIENT)
Dept: INTERNAL MEDICINE CLINIC | Facility: CLINIC | Age: 73
End: 2018-01-18

## 2018-01-18 ENCOUNTER — PRIOR ORIGINAL RECORDS (OUTPATIENT)
Dept: OTHER | Age: 73
End: 2018-01-18

## 2018-01-19 ENCOUNTER — TELEPHONE (OUTPATIENT)
Dept: INTERNAL MEDICINE CLINIC | Facility: CLINIC | Age: 73
End: 2018-01-19

## 2018-01-19 DIAGNOSIS — R10.9 ABDOMINAL PAIN, UNSPECIFIED ABDOMINAL LOCATION: Primary | ICD-10-CM

## 2018-01-19 DIAGNOSIS — D64.9 ANEMIA, UNSPECIFIED TYPE: ICD-10-CM

## 2018-01-19 NOTE — TELEPHONE ENCOUNTER
Martín Buck RN's message to patient. Reviewed specific instructions for test as stated by Darwin Kc. Patient verbalized understanding of information and instructions.

## 2018-01-19 NOTE — TELEPHONE ENCOUNTER
Per Dr Rios---order CT Abdomen and pelvis with NO CONTRAST. Dx abdominal pain, anemia  Patient already has a CT chest ordered on Monday at 11 am per Dr Param Coelho.   I spoke to [facility] in [facility] and asked her if patient would be able to have both CT scan

## 2018-01-22 ENCOUNTER — HOSPITAL ENCOUNTER (OUTPATIENT)
Dept: CT IMAGING | Facility: HOSPITAL | Age: 73
Discharge: HOME | End: 2018-01-22
Attending: PHYSICIAN ASSISTANT
Payer: MEDICARE

## 2018-01-22 ENCOUNTER — HOSPITAL ENCOUNTER (OUTPATIENT)
Dept: CT IMAGING | Facility: HOSPITAL | Age: 73
Discharge: HOME | End: 2018-01-22
Attending: INTERNAL MEDICINE
Payer: MEDICARE

## 2018-01-22 ENCOUNTER — TELEPHONE (OUTPATIENT)
Dept: SURGERY | Facility: CLINIC | Age: 73
End: 2018-01-22

## 2018-01-22 ENCOUNTER — TELEPHONE (OUTPATIENT)
Dept: INTERNAL MEDICINE CLINIC | Facility: CLINIC | Age: 73
End: 2018-01-22

## 2018-01-22 DIAGNOSIS — D64.9 ANEMIA, UNSPECIFIED TYPE: ICD-10-CM

## 2018-01-22 DIAGNOSIS — J91.8 PLEURAL EFFUSION IN OTHER CONDITIONS CLASSIFIED ELSEWHERE: ICD-10-CM

## 2018-01-22 DIAGNOSIS — R10.9 ABDOMINAL PAIN, UNSPECIFIED ABDOMINAL LOCATION: ICD-10-CM

## 2018-01-22 PROCEDURE — 71250 CT THORAX DX C-: CPT | Performed by: PHYSICIAN ASSISTANT

## 2018-01-22 PROCEDURE — 74176 CT ABD & PELVIS W/O CONTRAST: CPT | Performed by: INTERNAL MEDICINE

## 2018-01-22 NOTE — TELEPHONE ENCOUNTER
Please contact patient and this is requested by Dr. Nguyen Angeles who called me directly asking to see patient prior to his upcoming thoracic surgery for his abnormal findings on his CT scan

## 2018-01-22 NOTE — TELEPHONE ENCOUNTER
Phoned pt and spoke with him. Informed him this was at the request of , who spoke with . Pt agrees to appt. appt arranged in epic, for tomorrow. He is thankful.

## 2018-01-22 NOTE — TELEPHONE ENCOUNTER
Was asked by Lisset Zayas to call pt and place him on his schedule for tomorrow or Wednesday, stating pt is expecting the call. Phoned pt and spoke with him. He was completely unaware of this.  States he has upcoming thoracic surgery , however was not told that

## 2018-01-22 NOTE — TELEPHONE ENCOUNTER
I spoke to Sari regarding radiologist's findings on CT of abo, namely air anterior to the bladder - he has no  sx other than nocturia and his exam last week was unrevealing when I examined his abdomen. I then spoke to Dr Aldona Harada who will see him tomorrow. I will d/w Dr Dangelo Anand also.

## 2018-01-23 ENCOUNTER — OFFICE VISIT (OUTPATIENT)
Dept: SURGERY | Facility: CLINIC | Age: 73
End: 2018-01-23

## 2018-01-23 VITALS
BODY MASS INDEX: 27.3 KG/M2 | TEMPERATURE: 98 F | WEIGHT: 206 LBS | HEIGHT: 73 IN | DIASTOLIC BLOOD PRESSURE: 70 MMHG | SYSTOLIC BLOOD PRESSURE: 140 MMHG

## 2018-01-23 DIAGNOSIS — R93.89 ABNORMAL CAT SCAN: Primary | ICD-10-CM

## 2018-01-23 PROCEDURE — G0463 HOSPITAL OUTPT CLINIC VISIT: HCPCS | Performed by: UROLOGY

## 2018-01-23 PROCEDURE — 99214 OFFICE O/P EST MOD 30 MIN: CPT | Performed by: UROLOGY

## 2018-01-23 NOTE — PROGRESS NOTES
[address] Patient Status:  Outpatient    1945 MRN [id number]   Location [address] [facility] Attending Regla Eason. [address] Day # 0 PCP MD J Carlos Lopez is a 74 y Rfl: 11   finasteride 5 MG Oral Tab Take 1 tablet (5 mg total) by mouth daily.  Disp: 90 tablet Rfl: 3   hydrALAzine HCl 25 MG Oral Tab TAKE TWO TABLETS BY MOUTH THREE TIMES A DAY Disp: 540 tablet Rfl: 3   rivaroxaban (XARELTO) 15 MG Oral Tab Take 15 mg by MEAL EACH DAY Disp: 30 capsule Rfl: 11   Labetalol HCl 200 MG Oral Tab Take 200 mg by mouth 2 (two) times daily.  Disp:  Rfl:    indapamide 1.25 MG Oral Tab Take one every other day for BP and fluid retention Disp: 45 tablet Rfl: 3   ISOSORBIDE MONONITRATE therefore cystogram is ordered he has numerous small diverticuli consistent with outlet obstruction this is being treated with his medical therapy for BPH      ASSESSMENT AND PLAN:  Case summary: Patient is a 64-year-old white male who underwent urgent hem minutes with patient and well over half time face-to-face discussion of further evaluation and therapy          Jerardo Herrera MD  1/23/2018

## 2018-01-27 ENCOUNTER — HOSPITAL ENCOUNTER (OUTPATIENT)
Dept: GENERAL RADIOLOGY | Facility: HOSPITAL | Age: 73
Discharge: HOME | End: 2018-01-27
Attending: THORACIC SURGERY (CARDIOTHORACIC VASCULAR SURGERY)
Payer: MEDICARE

## 2018-01-27 ENCOUNTER — LAB ENCOUNTER (OUTPATIENT)
Dept: LAB | Facility: HOSPITAL | Age: 73
End: 2018-01-27
Attending: THORACIC SURGERY (CARDIOTHORACIC VASCULAR SURGERY)
Payer: MEDICARE

## 2018-01-27 DIAGNOSIS — Z01.818 PRE-OP TESTING: ICD-10-CM

## 2018-01-27 DIAGNOSIS — I10 HYPERTENSION, BENIGN: ICD-10-CM

## 2018-01-27 LAB
ALBUMIN SERPL BCP-MCNC: 2.9 G/DL (ref 3.5–4.8)
ALBUMIN/GLOB SERPL: 1 {RATIO} (ref 1–2)
ALP SERPL-CCNC: 70 U/L (ref 32–100)
ALT SERPL-CCNC: 15 U/L (ref 17–63)
ANION GAP SERPL CALC-SCNC: 9 MMOL/L (ref 0–18)
ANTIBODY SCREEN: NEGATIVE
APTT PPP: 42.5 SECONDS (ref 23.2–35.3)
AST SERPL-CCNC: 20 U/L (ref 15–41)
BASOPHILS # BLD: 0.1 K/UL (ref 0–0.2)
BASOPHILS NFR BLD: 1 %
BILIRUB SERPL-MCNC: 0.4 MG/DL (ref 0.3–1.2)
BILIRUB UR QL: NEGATIVE
BUN SERPL-MCNC: 45 MG/DL (ref 8–20)
BUN/CREAT SERPL: 29.8 (ref 10–20)
CALCIUM SERPL-MCNC: 8.9 MG/DL (ref 8.5–10.5)
CHLORIDE SERPL-SCNC: 110 MMOL/L (ref 95–110)
CHOLEST SERPL-MCNC: 129 MG/DL (ref 110–200)
CO2 SERPL-SCNC: 21 MMOL/L (ref 22–32)
COLOR UR: YELLOW
CREAT SERPL-MCNC: 1.51 MG/DL (ref 0.5–1.5)
EOSINOPHIL # BLD: 0.1 K/UL (ref 0–0.7)
EOSINOPHIL NFR BLD: 2 %
ERYTHROCYTE [DISTWIDTH] IN BLOOD BY AUTOMATED COUNT: 15.1 % (ref 11–15)
GLOBULIN PLAS-MCNC: 3 G/DL (ref 2.5–3.7)
GLUCOSE SERPL-MCNC: 85 MG/DL (ref 70–99)
GLUCOSE UR-MCNC: NEGATIVE MG/DL
HCT VFR BLD AUTO: 28.3 % (ref 41–52)
HDLC SERPL-MCNC: 69 MG/DL
HGB BLD-MCNC: 8.9 G/DL (ref 13.5–17.5)
HGB UR QL STRIP.AUTO: NEGATIVE
INR BLD: 2.3 (ref 0.9–1.2)
KETONES UR-MCNC: NEGATIVE MG/DL
LDLC SERPL CALC-MCNC: 53 MG/DL (ref 0–99)
LYMPHOCYTES # BLD: 0.4 K/UL (ref 1–4)
LYMPHOCYTES NFR BLD: 8 %
MCH RBC QN AUTO: 30.4 PG (ref 27–32)
MCHC RBC AUTO-ENTMCNC: 31.6 G/DL (ref 32–37)
MCV RBC AUTO: 96.4 FL (ref 80–100)
MONOCYTES # BLD: 0.4 K/UL (ref 0–1)
MONOCYTES NFR BLD: 9 %
MRSA DNA SPEC QL NAA+PROBE: NEGATIVE
NEUTROPHILS # BLD AUTO: 4 K/UL (ref 1.8–7.7)
NEUTROPHILS NFR BLD: 80 %
NITRITE UR QL STRIP.AUTO: NEGATIVE
NONHDLC SERPL-MCNC: 60 MG/DL
OSMOLALITY UR CALC.SUM OF ELEC: 301 MOSM/KG (ref 275–295)
PH UR: 5 [PH] (ref 5–8)
PLATELET # BLD AUTO: 273 K/UL (ref 140–400)
PMV BLD AUTO: 8.5 FL (ref 7.4–10.3)
POTASSIUM SERPL-SCNC: 4.6 MMOL/L (ref 3.3–5.1)
PROT SERPL-MCNC: 5.9 G/DL (ref 5.9–8.4)
PROT UR-MCNC: 100 MG/DL
PROTHROMBIN TIME: 25.1 SECONDS (ref 11.8–14.5)
RBC # BLD AUTO: 2.94 M/UL (ref 4.5–5.9)
RBC #/AREA URNS AUTO: 1 /HPF
RH BLOOD TYPE: POSITIVE
SODIUM SERPL-SCNC: 140 MMOL/L (ref 136–144)
SP GR UR STRIP: 1.01 (ref 1–1.03)
TRIGL SERPL-MCNC: 37 MG/DL (ref 1–149)
UROBILINOGEN UR STRIP-ACNC: <2
VIT C UR-MCNC: NEGATIVE MG/DL
WBC # BLD AUTO: 5 K/UL (ref 4–11)
WBC #/AREA URNS AUTO: 46 /HPF

## 2018-01-27 PROCEDURE — 81001 URINALYSIS AUTO W/SCOPE: CPT

## 2018-01-27 PROCEDURE — 71046 X-RAY EXAM CHEST 2 VIEWS: CPT | Performed by: THORACIC SURGERY (CARDIOTHORACIC VASCULAR SURGERY)

## 2018-01-27 PROCEDURE — 86850 RBC ANTIBODY SCREEN: CPT

## 2018-01-27 PROCEDURE — 85610 PROTHROMBIN TIME: CPT

## 2018-01-27 PROCEDURE — 93010 ELECTROCARDIOGRAM REPORT: CPT | Performed by: THORACIC SURGERY (CARDIOTHORACIC VASCULAR SURGERY)

## 2018-01-27 PROCEDURE — 80053 COMPREHEN METABOLIC PANEL: CPT

## 2018-01-27 PROCEDURE — 85730 THROMBOPLASTIN TIME PARTIAL: CPT

## 2018-01-27 PROCEDURE — 86900 BLOOD TYPING SEROLOGIC ABO: CPT

## 2018-01-27 PROCEDURE — 36415 COLL VENOUS BLD VENIPUNCTURE: CPT

## 2018-01-27 PROCEDURE — 80061 LIPID PANEL: CPT

## 2018-01-27 PROCEDURE — 86901 BLOOD TYPING SEROLOGIC RH(D): CPT

## 2018-01-27 PROCEDURE — 93005 ELECTROCARDIOGRAM TRACING: CPT

## 2018-01-27 PROCEDURE — 87641 MR-STAPH DNA AMP PROBE: CPT

## 2018-01-27 PROCEDURE — 85025 COMPLETE CBC W/AUTO DIFF WBC: CPT

## 2018-01-30 ENCOUNTER — TELEPHONE (OUTPATIENT)
Dept: CARDIAC SURGERY | Facility: HOSPITAL | Age: 73
End: 2018-01-30

## 2018-01-30 NOTE — PROGRESS NOTES
Misc. Note    Notified by Dr. Robel Sharp office that PAT dept called to inform office that pt. Had some abnormal preop labs, UA and EKG. Pt. Scheduled for elective lung surgery tomorrow with Dr. Wisam Iverson. Discussed findings with surgeon. Pt.  With hx of feliciano

## 2018-01-30 NOTE — PAT NURSING NOTE
Edda with Dr Pool Walters notified of abnormal labs and EKG. She will have the PA or APN address this.

## 2018-01-30 NOTE — PAT NURSING NOTE
Spoke with Sylvie Millan from Dr Lea Matters office. EKG was reviewed by Dr. Shalini Burris, in Dr Sylvie Montaño absence. Dr Shalini Burris cleared patient for surgery, requested EKG for morning of surgery.

## 2018-01-30 NOTE — PAT NURSING NOTE
Spoke with [place] with Dr Ellie Davis. States that abnormal labs are a chronic issue and that Dr Maritza Todd will be reviewing the abnormal EKG.

## 2018-01-31 ENCOUNTER — HOSPITAL ENCOUNTER (INPATIENT)
Facility: HOSPITAL | Age: 73
LOS: 5 days | Discharge: HOME | DRG: 271 | End: 2018-02-05
Attending: THORACIC SURGERY (CARDIOTHORACIC VASCULAR SURGERY) | Admitting: THORACIC SURGERY (CARDIOTHORACIC VASCULAR SURGERY)
Payer: MEDICARE

## 2018-01-31 ENCOUNTER — SURGERY (OUTPATIENT)
Age: 73
End: 2018-01-31

## 2018-01-31 ENCOUNTER — APPOINTMENT (OUTPATIENT)
Dept: GENERAL RADIOLOGY | Facility: HOSPITAL | Age: 73
DRG: 271 | End: 2018-01-31
Attending: CLINICAL NURSE SPECIALIST
Payer: MEDICARE

## 2018-01-31 ENCOUNTER — ANESTHESIA EVENT (OUTPATIENT)
Dept: SURGERY | Facility: HOSPITAL | Age: 73
DRG: 271 | End: 2018-01-31
Payer: MEDICARE

## 2018-01-31 ENCOUNTER — ANESTHESIA (OUTPATIENT)
Dept: SURGERY | Facility: HOSPITAL | Age: 73
DRG: 271 | End: 2018-01-31
Payer: MEDICARE

## 2018-01-31 DIAGNOSIS — Z01.818 PRE-OP TESTING: Primary | ICD-10-CM

## 2018-01-31 PROBLEM — Z98.890 S/P THORACOTOMY: Status: ACTIVE | Noted: 2018-01-31

## 2018-01-31 PROCEDURE — 0B5P0ZZ DESTRUCTION OF LEFT PLEURA, OPEN APPROACH: ICD-10-PCS | Performed by: THORACIC SURGERY (CARDIOTHORACIC VASCULAR SURGERY)

## 2018-01-31 PROCEDURE — 71045 X-RAY EXAM CHEST 1 VIEW: CPT | Performed by: CLINICAL NURSE SPECIALIST

## 2018-01-31 PROCEDURE — 3E0T3BZ INTRODUCTION OF ANESTHETIC AGENT INTO PERIPHERAL NERVES AND PLEXI, PERCUTANEOUS APPROACH: ICD-10-PCS | Performed by: THORACIC SURGERY (CARDIOTHORACIC VASCULAR SURGERY)

## 2018-01-31 PROCEDURE — 0BCL0ZZ EXTIRPATION OF MATTER FROM LEFT LUNG, OPEN APPROACH: ICD-10-PCS | Performed by: THORACIC SURGERY (CARDIOTHORACIC VASCULAR SURGERY)

## 2018-01-31 PROCEDURE — 3E0L4GC INTRODUCTION OF OTHER THERAPEUTIC SUBSTANCE INTO PLEURAL CAVITY, PERCUTANEOUS ENDOSCOPIC APPROACH: ICD-10-PCS | Performed by: THORACIC SURGERY (CARDIOTHORACIC VASCULAR SURGERY)

## 2018-01-31 PROCEDURE — 0BBP0ZZ EXCISION OF LEFT PLEURA, OPEN APPROACH: ICD-10-PCS | Performed by: THORACIC SURGERY (CARDIOTHORACIC VASCULAR SURGERY)

## 2018-01-31 PROCEDURE — 3E0L3GC INTRODUCTION OF OTHER THERAPEUTIC SUBSTANCE INTO PLEURAL CAVITY, PERCUTANEOUS APPROACH: ICD-10-PCS | Performed by: THORACIC SURGERY (CARDIOTHORACIC VASCULAR SURGERY)

## 2018-01-31 PROCEDURE — 0W9D0ZX DRAINAGE OF PERICARDIAL CAVITY, OPEN APPROACH, DIAGNOSTIC: ICD-10-PCS | Performed by: THORACIC SURGERY (CARDIOTHORACIC VASCULAR SURGERY)

## 2018-01-31 PROCEDURE — 99232 SBSQ HOSP IP/OBS MODERATE 35: CPT | Performed by: HOSPITALIST

## 2018-01-31 RX ORDER — MELATONIN
400 DAILY
Status: DISCONTINUED | OUTPATIENT
Start: 2018-02-01 | End: 2018-02-05

## 2018-01-31 RX ORDER — MORPHINE SULFATE 4 MG/ML
4 INJECTION, SOLUTION INTRAMUSCULAR; INTRAVENOUS EVERY 10 MIN PRN
Status: DISCONTINUED | OUTPATIENT
Start: 2018-01-31 | End: 2018-01-31

## 2018-01-31 RX ORDER — DEXAMETHASONE SODIUM PHOSPHATE 4 MG/ML
VIAL (ML) INJECTION AS NEEDED
Status: DISCONTINUED | OUTPATIENT
Start: 2018-01-31 | End: 2018-01-31 | Stop reason: SURG

## 2018-01-31 RX ORDER — HYDROCODONE BITARTRATE AND ACETAMINOPHEN 5; 325 MG/1; MG/1
1 TABLET ORAL AS NEEDED
Status: DISCONTINUED | OUTPATIENT
Start: 2018-01-31 | End: 2018-01-31

## 2018-01-31 RX ORDER — KETOROLAC TROMETHAMINE 15 MG/ML
INJECTION, SOLUTION INTRAMUSCULAR; INTRAVENOUS AS NEEDED
Status: DISCONTINUED | OUTPATIENT
Start: 2018-01-31 | End: 2018-01-31 | Stop reason: SURG

## 2018-01-31 RX ORDER — METOCLOPRAMIDE 10 MG/1
10 TABLET ORAL ONCE
Status: DISCONTINUED | OUTPATIENT
Start: 2018-01-31 | End: 2018-01-31 | Stop reason: HOSPADM

## 2018-01-31 RX ORDER — DOXYCYCLINE 100 MG/10ML
INJECTION, POWDER, LYOPHILIZED, FOR SOLUTION INTRAVENOUS AS NEEDED
Status: DISCONTINUED | OUTPATIENT
Start: 2018-01-31 | End: 2018-01-31 | Stop reason: HOSPADM

## 2018-01-31 RX ORDER — MORPHINE SULFATE 10 MG/ML
6 INJECTION, SOLUTION INTRAMUSCULAR; INTRAVENOUS EVERY 10 MIN PRN
Status: DISCONTINUED | OUTPATIENT
Start: 2018-01-31 | End: 2018-01-31

## 2018-01-31 RX ORDER — HYDROMORPHONE HYDROCHLORIDE 1 MG/ML
0.4 INJECTION, SOLUTION INTRAMUSCULAR; INTRAVENOUS; SUBCUTANEOUS EVERY 30 MIN PRN
Status: DISCONTINUED | OUTPATIENT
Start: 2018-01-31 | End: 2018-02-05

## 2018-01-31 RX ORDER — FAMOTIDINE 20 MG/1
20 TABLET ORAL ONCE
Status: DISCONTINUED | OUTPATIENT
Start: 2018-01-31 | End: 2018-01-31 | Stop reason: HOSPADM

## 2018-01-31 RX ORDER — HYDROCODONE BITARTRATE AND ACETAMINOPHEN 5; 325 MG/1; MG/1
2 TABLET ORAL EVERY 4 HOURS PRN
Status: DISCONTINUED | OUTPATIENT
Start: 2018-01-31 | End: 2018-02-05

## 2018-01-31 RX ORDER — PHENYLEPHRINE HCL 10 MG/ML
VIAL (ML) INJECTION AS NEEDED
Status: DISCONTINUED | OUTPATIENT
Start: 2018-01-31 | End: 2018-01-31 | Stop reason: SURG

## 2018-01-31 RX ORDER — FINASTERIDE 5 MG/1
5 TABLET, FILM COATED ORAL DAILY
Status: DISCONTINUED | OUTPATIENT
Start: 2018-02-01 | End: 2018-01-31

## 2018-01-31 RX ORDER — ROCURONIUM BROMIDE 10 MG/ML
INJECTION, SOLUTION INTRAVENOUS AS NEEDED
Status: DISCONTINUED | OUTPATIENT
Start: 2018-01-31 | End: 2018-01-31 | Stop reason: SURG

## 2018-01-31 RX ORDER — METOPROLOL TARTRATE 5 MG/5ML
2.5 INJECTION INTRAVENOUS ONCE
Status: DISCONTINUED | OUTPATIENT
Start: 2018-01-31 | End: 2018-01-31 | Stop reason: HOSPADM

## 2018-01-31 RX ORDER — DIPHENHYDRAMINE HYDROCHLORIDE 50 MG/ML
12.5 INJECTION INTRAMUSCULAR; INTRAVENOUS EVERY 4 HOURS PRN
Status: DISCONTINUED | OUTPATIENT
Start: 2018-01-31 | End: 2018-02-05

## 2018-01-31 RX ORDER — ONDANSETRON 2 MG/ML
4 INJECTION INTRAMUSCULAR; INTRAVENOUS EVERY 6 HOURS PRN
Status: DISCONTINUED | OUTPATIENT
Start: 2018-01-31 | End: 2018-02-05

## 2018-01-31 RX ORDER — HYDROCODONE BITARTRATE AND ACETAMINOPHEN 5; 325 MG/1; MG/1
2 TABLET ORAL AS NEEDED
Status: DISCONTINUED | OUTPATIENT
Start: 2018-01-31 | End: 2018-01-31

## 2018-01-31 RX ORDER — HYDROMORPHONE HYDROCHLORIDE 1 MG/ML
0.4 INJECTION, SOLUTION INTRAMUSCULAR; INTRAVENOUS; SUBCUTANEOUS EVERY 5 MIN PRN
Status: DISCONTINUED | OUTPATIENT
Start: 2018-01-31 | End: 2018-01-31

## 2018-01-31 RX ORDER — DEXTROSE, SODIUM CHLORIDE, AND POTASSIUM CHLORIDE 5; .9; .15 G/100ML; G/100ML; G/100ML
INJECTION INTRAVENOUS CONTINUOUS
Status: DISCONTINUED | OUTPATIENT
Start: 2018-01-31 | End: 2018-02-02

## 2018-01-31 RX ORDER — MORPHINE SULFATE 2 MG/ML
2 INJECTION, SOLUTION INTRAMUSCULAR; INTRAVENOUS EVERY 10 MIN PRN
Status: DISCONTINUED | OUTPATIENT
Start: 2018-01-31 | End: 2018-01-31

## 2018-01-31 RX ORDER — NALOXONE HYDROCHLORIDE 0.4 MG/ML
80 INJECTION, SOLUTION INTRAMUSCULAR; INTRAVENOUS; SUBCUTANEOUS AS NEEDED
Status: DISCONTINUED | OUTPATIENT
Start: 2018-01-31 | End: 2018-01-31 | Stop reason: HOSPADM

## 2018-01-31 RX ORDER — ONDANSETRON 2 MG/ML
4 INJECTION INTRAMUSCULAR; INTRAVENOUS ONCE AS NEEDED
Status: DISCONTINUED | OUTPATIENT
Start: 2018-01-31 | End: 2018-01-31

## 2018-01-31 RX ORDER — SODIUM CHLORIDE, SODIUM LACTATE, POTASSIUM CHLORIDE, CALCIUM CHLORIDE 600; 310; 30; 20 MG/100ML; MG/100ML; MG/100ML; MG/100ML
INJECTION, SOLUTION INTRAVENOUS CONTINUOUS
Status: DISCONTINUED | OUTPATIENT
Start: 2018-01-31 | End: 2018-02-02

## 2018-01-31 RX ORDER — ENOXAPARIN SODIUM 100 MG/ML
40 INJECTION SUBCUTANEOUS DAILY
Status: DISCONTINUED | OUTPATIENT
Start: 2018-02-01 | End: 2018-02-01

## 2018-01-31 RX ORDER — ONDANSETRON 2 MG/ML
INJECTION INTRAMUSCULAR; INTRAVENOUS AS NEEDED
Status: DISCONTINUED | OUTPATIENT
Start: 2018-01-31 | End: 2018-01-31 | Stop reason: SURG

## 2018-01-31 RX ORDER — HYDRALAZINE HYDROCHLORIDE 50 MG/1
50 TABLET, FILM COATED ORAL EVERY 8 HOURS SCHEDULED
Status: DISCONTINUED | OUTPATIENT
Start: 2018-02-01 | End: 2018-02-02

## 2018-01-31 RX ORDER — LABETALOL 200 MG/1
200 TABLET, FILM COATED ORAL 2 TIMES DAILY
Status: DISCONTINUED | OUTPATIENT
Start: 2018-02-01 | End: 2018-02-05

## 2018-01-31 RX ORDER — ACETAMINOPHEN 500 MG
1000 TABLET ORAL ONCE
Status: COMPLETED | OUTPATIENT
Start: 2018-01-31 | End: 2018-01-31

## 2018-01-31 RX ORDER — CEFAZOLIN SODIUM/WATER 2 G/20 ML
2 SYRINGE (ML) INTRAVENOUS ONCE
Status: COMPLETED | OUTPATIENT
Start: 2018-01-31 | End: 2018-01-31

## 2018-01-31 RX ORDER — ALFUZOSIN HYDROCHLORIDE 10 MG/1
10 TABLET, EXTENDED RELEASE ORAL
Status: DISCONTINUED | OUTPATIENT
Start: 2018-02-01 | End: 2018-02-05

## 2018-01-31 RX ORDER — PANTOPRAZOLE SODIUM 40 MG/1
40 TABLET, DELAYED RELEASE ORAL
Status: DISCONTINUED | OUTPATIENT
Start: 2018-02-01 | End: 2018-02-05

## 2018-01-31 RX ORDER — FINASTERIDE 5 MG/1
5 TABLET, FILM COATED ORAL DAILY
Status: DISCONTINUED | OUTPATIENT
Start: 2018-02-01 | End: 2018-02-05

## 2018-01-31 RX ORDER — CEFAZOLIN SODIUM/WATER 2 G/20 ML
2 SYRINGE (ML) INTRAVENOUS EVERY 8 HOURS
Status: COMPLETED | OUTPATIENT
Start: 2018-01-31 | End: 2018-02-01

## 2018-01-31 RX ORDER — ONDANSETRON 2 MG/ML
4 INJECTION INTRAMUSCULAR; INTRAVENOUS EVERY 6 HOURS PRN
Status: DISCONTINUED | OUTPATIENT
Start: 2018-01-31 | End: 2018-01-31

## 2018-01-31 RX ORDER — NEOSTIGMINE METHYLSULFATE 0.5 MG/ML
INJECTION INTRAVENOUS AS NEEDED
Status: DISCONTINUED | OUTPATIENT
Start: 2018-01-31 | End: 2018-01-31 | Stop reason: SURG

## 2018-01-31 RX ORDER — DOXYCYCLINE 100 MG/10ML
500 INJECTION, POWDER, LYOPHILIZED, FOR SOLUTION INTRAVENOUS ONCE
Status: DISCONTINUED | OUTPATIENT
Start: 2018-01-31 | End: 2018-01-31

## 2018-01-31 RX ORDER — HYDROMORPHONE HYDROCHLORIDE 1 MG/ML
0.6 INJECTION, SOLUTION INTRAMUSCULAR; INTRAVENOUS; SUBCUTANEOUS EVERY 5 MIN PRN
Status: DISCONTINUED | OUTPATIENT
Start: 2018-01-31 | End: 2018-01-31

## 2018-01-31 RX ORDER — SUFENTANIL CITRATE 50 UG/ML
INJECTION EPIDURAL; INTRAVENOUS AS NEEDED
Status: DISCONTINUED | OUTPATIENT
Start: 2018-01-31 | End: 2018-01-31 | Stop reason: SURG

## 2018-01-31 RX ORDER — SODIUM CHLORIDE, SODIUM LACTATE, POTASSIUM CHLORIDE, CALCIUM CHLORIDE 600; 310; 30; 20 MG/100ML; MG/100ML; MG/100ML; MG/100ML
INJECTION, SOLUTION INTRAVENOUS CONTINUOUS
Status: DISCONTINUED | OUTPATIENT
Start: 2018-01-31 | End: 2018-01-31 | Stop reason: HOSPADM

## 2018-01-31 RX ORDER — HYDROCODONE BITARTRATE AND ACETAMINOPHEN 5; 325 MG/1; MG/1
1 TABLET ORAL EVERY 4 HOURS PRN
Status: DISCONTINUED | OUTPATIENT
Start: 2018-01-31 | End: 2018-02-05

## 2018-01-31 RX ORDER — NALOXONE HYDROCHLORIDE 0.4 MG/ML
0.08 INJECTION, SOLUTION INTRAMUSCULAR; INTRAVENOUS; SUBCUTANEOUS
Status: DISCONTINUED | OUTPATIENT
Start: 2018-01-31 | End: 2018-02-05

## 2018-01-31 RX ORDER — MIDAZOLAM HYDROCHLORIDE 1 MG/ML
INJECTION INTRAMUSCULAR; INTRAVENOUS AS NEEDED
Status: DISCONTINUED | OUTPATIENT
Start: 2018-01-31 | End: 2018-01-31 | Stop reason: SURG

## 2018-01-31 RX ORDER — HYDROMORPHONE HYDROCHLORIDE 1 MG/ML
0.2 INJECTION, SOLUTION INTRAMUSCULAR; INTRAVENOUS; SUBCUTANEOUS EVERY 5 MIN PRN
Status: DISCONTINUED | OUTPATIENT
Start: 2018-01-31 | End: 2018-01-31

## 2018-01-31 RX ORDER — EPHEDRINE SULFATE 50 MG/ML
INJECTION, SOLUTION INTRAVENOUS AS NEEDED
Status: DISCONTINUED | OUTPATIENT
Start: 2018-01-31 | End: 2018-01-31 | Stop reason: SURG

## 2018-01-31 RX ORDER — LIDOCAINE HYDROCHLORIDE 10 MG/ML
INJECTION, SOLUTION EPIDURAL; INFILTRATION; INTRACAUDAL; PERINEURAL AS NEEDED
Status: DISCONTINUED | OUTPATIENT
Start: 2018-01-31 | End: 2018-01-31 | Stop reason: SURG

## 2018-01-31 RX ORDER — GLYCOPYRROLATE 0.2 MG/ML
INJECTION INTRAMUSCULAR; INTRAVENOUS AS NEEDED
Status: DISCONTINUED | OUTPATIENT
Start: 2018-01-31 | End: 2018-01-31 | Stop reason: SURG

## 2018-01-31 RX ADMIN — DEXAMETHASONE SODIUM PHOSPHATE 8 MG: 4 MG/ML VIAL (ML) INJECTION at 11:15:00

## 2018-01-31 RX ADMIN — SODIUM CHLORIDE, SODIUM LACTATE, POTASSIUM CHLORIDE, CALCIUM CHLORIDE: 600; 310; 30; 20 INJECTION, SOLUTION INTRAVENOUS at 11:56:00

## 2018-01-31 RX ADMIN — GLYCOPYRROLATE 1 MG: 0.2 INJECTION INTRAMUSCULAR; INTRAVENOUS at 12:55:00

## 2018-01-31 RX ADMIN — PHENYLEPHRINE HCL 100 MCG: 10 MG/ML VIAL (ML) INJECTION at 10:33:00

## 2018-01-31 RX ADMIN — ROCURONIUM BROMIDE 20 MG: 10 INJECTION, SOLUTION INTRAVENOUS at 11:01:00

## 2018-01-31 RX ADMIN — PHENYLEPHRINE HCL 100 MCG: 10 MG/ML VIAL (ML) INJECTION at 10:40:00

## 2018-01-31 RX ADMIN — CEFAZOLIN SODIUM/WATER 2 G: 2 G/20 ML SYRINGE (ML) INTRAVENOUS at 10:50:00

## 2018-01-31 RX ADMIN — SODIUM CHLORIDE, SODIUM LACTATE, POTASSIUM CHLORIDE, CALCIUM CHLORIDE: 600; 310; 30; 20 INJECTION, SOLUTION INTRAVENOUS at 11:30:00

## 2018-01-31 RX ADMIN — ONDANSETRON 4 MG: 2 INJECTION INTRAMUSCULAR; INTRAVENOUS at 12:55:00

## 2018-01-31 RX ADMIN — SODIUM CHLORIDE, SODIUM LACTATE, POTASSIUM CHLORIDE, CALCIUM CHLORIDE: 600; 310; 30; 20 INJECTION, SOLUTION INTRAVENOUS at 10:13:00

## 2018-01-31 RX ADMIN — SODIUM CHLORIDE, SODIUM LACTATE, POTASSIUM CHLORIDE, CALCIUM CHLORIDE: 600; 310; 30; 20 INJECTION, SOLUTION INTRAVENOUS at 11:55:00

## 2018-01-31 RX ADMIN — KETOROLAC TROMETHAMINE 15 MG: 15 INJECTION, SOLUTION INTRAMUSCULAR; INTRAVENOUS at 12:22:00

## 2018-01-31 RX ADMIN — EPHEDRINE SULFATE 5 MG: 50 INJECTION, SOLUTION INTRAVENOUS at 11:44:00

## 2018-01-31 RX ADMIN — SUFENTANIL CITRATE 40 MCG: 50 INJECTION EPIDURAL; INTRAVENOUS at 10:31:00

## 2018-01-31 RX ADMIN — LIDOCAINE HYDROCHLORIDE 50 MG: 10 INJECTION, SOLUTION EPIDURAL; INFILTRATION; INTRACAUDAL; PERINEURAL at 10:31:00

## 2018-01-31 RX ADMIN — PHENYLEPHRINE HCL 100 MCG: 10 MG/ML VIAL (ML) INJECTION at 11:11:00

## 2018-01-31 RX ADMIN — NEOSTIGMINE METHYLSULFATE 5 MG: 0.5 INJECTION INTRAVENOUS at 12:55:00

## 2018-01-31 RX ADMIN — PHENYLEPHRINE HCL 100 MCG: 10 MG/ML VIAL (ML) INJECTION at 10:46:00

## 2018-01-31 RX ADMIN — SODIUM CHLORIDE, SODIUM LACTATE, POTASSIUM CHLORIDE, CALCIUM CHLORIDE: 600; 310; 30; 20 INJECTION, SOLUTION INTRAVENOUS at 11:00:00

## 2018-01-31 RX ADMIN — MIDAZOLAM HYDROCHLORIDE 2 MG: 1 INJECTION INTRAMUSCULAR; INTRAVENOUS at 10:15:00

## 2018-01-31 RX ADMIN — EPHEDRINE SULFATE 10 MG: 50 INJECTION, SOLUTION INTRAVENOUS at 10:47:00

## 2018-01-31 RX ADMIN — ROCURONIUM BROMIDE 10 MG: 10 INJECTION, SOLUTION INTRAVENOUS at 11:11:00

## 2018-01-31 NOTE — OPERATIVE REPORT
[facility] POST ANESTHESIA CARE UNIT  Operative Note     Zhen Matias Location: OR   [facility] [id number] MRN [id number]   Admission Date 1/31/2018 Operation Date 1/31/2018   Attending Physician Diana Dewey MD Operating Physician Abdulaziz Noe, he was induced and intubated with a double-lumen endotracheal tube and carefully positioned left side up. The left lung was collapsed. A port was made in the sixth interspace posterior axillary line and the pleural space was entered.   A camera was introd placed to suction anesthesia was getting most of the volume back in the air leak was just a small amount with peak inspiration. The patient was extubated awoken and brought to recovery room in stable condition.      Debra Terry MD  1/31/2018  1:33 P

## 2018-01-31 NOTE — ANESTHESIA PROCEDURE NOTES
Arterial Line  Performed by: Marvin Gil by: Minerva Latham     Procedure Start:  1/31/2018 10:00 AM  Procedure End:  1/31/2018 10:05 AM  Site Identification: surface landmarks    Patient Location:  OR  Indication: continuous blood pre

## 2018-01-31 NOTE — ANESTHESIA POSTPROCEDURE EVALUATION
Patient: Bridgett Mueller    Procedure Summary     Date:  01/31/18 Room / Location:  [address] [place] [address] MAIN OR 18 / [address] [place] [address] MAIN OR    Anesthesia Start:  7821 Anesthesia Stop:  0757    Procedure:  THORACOSCOPY/VATS (Left ) Diagnosis:  (Recurrent pleural effusion )    Surgeon:

## 2018-01-31 NOTE — PROGRESS NOTES
[facility] HOSP - [facility]    Progress Note    Miguel Cottrell Patient Status:  Surgery Admit    1945 MRN [id number]   Location [address] UNIT Attending Mo Najera MD   Hosp Day # 0 PCP Cora Rios MD     S partial pleurectomy; complete decortication; mechanical and chemical pleurodesis; pericardial window. MONITOR ARTERIAL LINE AND CHEST TUBE, DVT PROPHYLAXIS, PAIN CONTROL. Hypertension, benign  CONT HOME MEDS, MONITOR.          Chronic atrial fibrilla

## 2018-01-31 NOTE — ANESTHESIA PREPROCEDURE EVALUATION
Anesthesia PreOp Note    HPI:     Ric Reid is a 74year old male who presents for preoperative consultation requested by: Mart Johnson MD    Date of Surgery: 1/31/2018    Procedure(s):  THORACOSCOPY/VATS  Indication: Recurrent pleural effusion Hematologic disorder 09/2005    per NextGen:  \"CrCl est. 60 ml/min\"   • High blood pressure    • High cholesterol    • History of blood transfusion 03/2016   • History of thoracentesis 08/07/2017       • Hyposmolality and/or hyponatremia 8/14/2012   • MEAL EACH DAY Disp: 30 capsule Rfl: 11 1/30/2018 at 0930   Labetalol HCl 200 MG Oral Tab Take 200 mg by mouth 2 (two) times daily.  Disp:  Rfl:  1/31/2018 at 0700   indapamide 1.25 MG Oral Tab Take one every other day for BP and fluid retention Disp: 45 tab Juwan Tanw Father 80     per NextGen   • Hypertension Mother    • Breast Cancer Mother    • Cancer Sister      lung cancer   • Hypertension Sister        Social History  Social History   Marital status:   Spouse name: N/A    Years of education: N/A  N Physical Exam     Patient summary reviewed and Nursing notes reviewed    No history of anesthetic complications   Airway   Mallampati: III  TM distance: >3 FB  Neck ROM: full  Dental    (+) upper dentures and lower dentures    Pulmonary - normal exam     R

## 2018-02-01 ENCOUNTER — APPOINTMENT (OUTPATIENT)
Dept: GENERAL RADIOLOGY | Facility: HOSPITAL | Age: 73
DRG: 271 | End: 2018-02-01
Attending: CLINICAL NURSE SPECIALIST
Payer: MEDICARE

## 2018-02-01 LAB
ANION GAP SERPL CALC-SCNC: 7 MMOL/L (ref 0–18)
APTT PPP: 30.8 SECONDS (ref 23.2–35.3)
BUN SERPL-MCNC: 43 MG/DL (ref 8–20)
BUN/CREAT SERPL: 21.6 (ref 10–20)
CALCIUM SERPL-MCNC: 8.4 MG/DL (ref 8.5–10.5)
CHLORIDE SERPL-SCNC: 112 MMOL/L (ref 95–110)
CO2 SERPL-SCNC: 21 MMOL/L (ref 22–32)
CREAT SERPL-MCNC: 1.99 MG/DL (ref 0.5–1.5)
ERYTHROCYTE [DISTWIDTH] IN BLOOD BY AUTOMATED COUNT: 14.4 % (ref 11–15)
GLUCOSE SERPL-MCNC: 168 MG/DL (ref 70–99)
HCT VFR BLD AUTO: 29 % (ref 41–52)
HGB BLD-MCNC: 9.2 G/DL (ref 13.5–17.5)
INR BLD: 1.1 (ref 0.9–1.2)
MCH RBC QN AUTO: 30.1 PG (ref 27–32)
MCHC RBC AUTO-ENTMCNC: 31.9 G/DL (ref 32–37)
MCV RBC AUTO: 94.4 FL (ref 80–100)
OSMOLALITY UR CALC.SUM OF ELEC: 305 MOSM/KG (ref 275–295)
PLATELET # BLD AUTO: 272 K/UL (ref 140–400)
PMV BLD AUTO: 8.1 FL (ref 7.4–10.3)
POTASSIUM SERPL-SCNC: 4.9 MMOL/L (ref 3.3–5.1)
PROTHROMBIN TIME: 14.1 SECONDS (ref 11.8–14.5)
RBC # BLD AUTO: 3.07 M/UL (ref 4.5–5.9)
SODIUM SERPL-SCNC: 140 MMOL/L (ref 136–144)
WBC # BLD AUTO: 10.9 K/UL (ref 4–11)

## 2018-02-01 PROCEDURE — 71045 X-RAY EXAM CHEST 1 VIEW: CPT | Performed by: CLINICAL NURSE SPECIALIST

## 2018-02-01 PROCEDURE — 99232 SBSQ HOSP IP/OBS MODERATE 35: CPT | Performed by: HOSPITALIST

## 2018-02-01 RX ORDER — ISOSORBIDE MONONITRATE 30 MG/1
30 TABLET, EXTENDED RELEASE ORAL
Status: DISCONTINUED | OUTPATIENT
Start: 2018-02-01 | End: 2018-02-05

## 2018-02-01 RX ORDER — HEPARIN SODIUM 5000 [USP'U]/ML
5000 INJECTION, SOLUTION INTRAVENOUS; SUBCUTANEOUS EVERY 12 HOURS SCHEDULED
Status: DISCONTINUED | OUTPATIENT
Start: 2018-02-01 | End: 2018-02-04

## 2018-02-01 NOTE — CM/SW NOTE
JYOTI received for dc planning. SW met with the pt who verified address and phone as listed on the facesheet. Pt lives alone in the middle level of a 3 flat house, which has 5 step entry. Pt states he is ambulatory without device, no breathing equipment.  Pt

## 2018-02-01 NOTE — CONSULTS
Pulmonary H&P/Consult     NAME: Maureen More: [address] - MRN: [id number] - Age: 74year old - :  1945    Date of Admission: 2018  7:59 AM  Admission Diagnosis: Recurrent pleural effusion   S/P thoracotomy    Assessment/Plan:  1.  Rec right hip ([facility] [address] 1999   • Hematologic disorder 09/2005    per NextGen:  \"CrCl est. 60 ml/min\"   • High blood pressure    • High cholesterol    • History of blood transfusion 03/2016   • History of thoracentesis 08/07/2017       • Hyposmolality and/or hy Mononitrate ER  30 mg Oral Daily   • Heparin Sodium (Porcine)  5,000 Units Subcutaneous 2 times per day   • [MAR Hold] doxycycline  500 mg Intrapleural Once   • finasteride  5 mg Oral Daily   • folic acid  814 mcg Oral Daily   • hydrALAzine HCl  50 mg Oral interpretation except where noted.

## 2018-02-01 NOTE — HISTORICAL OFFICE NOTE
Dennie Roque  : 1945  ACCOUNT:  [id number]  [phone]  PCP: Dr. Maren Calderon     TODAY'S DATE: 2017  DICTATED BY:  [Dr. Jyoti Arredondo: [Followup of .  Coronary Atherosclerosis Of Native Coronary Artery and Followup of Caroti drainage. As part of the evaluation of this fluid the patient had a CT scan done in late October of this year. Dr. June Ag called me concerned about what was described as severe enlargement of the pulmonary artery.   Also noted on that report was a descr diet. MARITAL STATUS: . OCCUPATION: Retired [employer] de[employer].      ALLERGIES: No Known Allergies    MEDICATIONS: Selected prescriptions see below    VITAL SIGNS: [B/P - 108/58 , Pulse - 61, Weight -  206, Height -   72 , BMI - 27.9 ]    CON that the risk of rupture is low. We will rule out Behcet's disease. []    ASSESSMENT:  1. . Coronary Atherosclerosis Of Native Coronary Artery  2. Pericardial effusion  3. Carotid Occlusion> 70%/Right and <50%-Left/7/06  4.  Anticoagulant Medication Manag right renal arteries consistent with less than 50% stenosis. 2.  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 3.  Abnormal renal resistive indices consistent with parenchymal renal disease. 4.  Symmetrical kidneys.   5.  Aortic atherosclerosis symptomatology: Precordial pain     PROCEDURE: Rest SPECT imaging was performed following the intravenous administration of 4.0 mCi of thallium 201. Graded treadmill exercise testing was then performed using a Nicanor protocol.   The patient completed a tota ventricular systolic function. 3. Normal exercise EKG. 4. No chest pain noted with exercise. 5. Rare PACs and PVCs. 6. Atrial couplets x 1.  7. Baseline hypertension with a hypertensive response noted with exercise. Chris Cramer M.D.   Della Of STOPPING TEST: General fatigue                                     EXERCISE CAPACITY: Above average  METS: 10                                % OF MAXIMAL HEART RATE: 89  BP RESPONSE: Hypertensive blood pressure response to exercise, baseline hypertension Aortic valve: Mild to moderate regurgitation. · Mitral valve: Mild regurgitation. · Left atrium: The atrium was severely dilated. · Right atrium: The atrium was mildly to moderately dilated. · Tricuspid valve: Mild regurgitation.   · Pulmonary arteries:

## 2018-02-01 NOTE — PROGRESS NOTES
02/01/18 1321   Clinical Encounter Type   Visited With Patient   Routine Visit Introduction   Continue Visiting Yes   Surgical Visit Post-op   Crisis Visit (PCCU)   Referral From Nurse  (Consult)   Referral To    Patient Spiritual Encounters   S

## 2018-02-01 NOTE — PROGRESS NOTES
[facility]  Hospitalist Progress  Note     Jorge Burn Patient Status:  Inpatient    1945  74year old CSN [id number]   Location [address] Attending Jean Marie Mora MD   Hosp Day # 1 PCP Natalie Styles.  MD Blanca     ASSESSMENT/PLAN 30 mg Oral Daily   • Heparin Sodium (Porcine)  5,000 Units Subcutaneous 2 times per day   • [MAR Hold] doxycycline  500 mg Intrapleural Once   • finasteride  5 mg Oral Daily   • folic acid  359 mcg Oral Daily   • hydrALAzine HCl  50 mg Oral Q8H [facility]   • Lab

## 2018-02-01 NOTE — CONSULTS
[facility] AND [facility]  [facility] Cardiology Consult Note    Zhen Matias Patient Status:  Inpatient    1945 MRN [id number]   Location [facility] 2W/SW Attending Ann Hankins MD   Hosp Day # 1 PCP Alli Rios MD     74year old male s/p p Fracture of right hip ([facility] [address] 1999   • Hematologic disorder 09/2005    per NextGen:  \"CrCl est. 60 ml/min\"   • High blood pressure    • High cholesterol    • History of blood transfusion 03/2016   • History of thoracentesis 08/07/2017       • Hyposmolali drugs.    Objective:   Temp: 97.6 °F (36.4 °C)  Pulse: 62  Resp: 16  BP: 97/60    Intake/Output:     Intake/Output Summary (Last 24 hours) at 02/01/18 1600  Last data filed at 02/01/18 1536   Gross per 24 hour   Intake             4508 ml   Output

## 2018-02-01 NOTE — PROGRESS NOTES
Misc. Note    Ed Le NP  2018  [facility] HOSP - [facility]    Progress Note    Robert Bains Patient Status:  Inpatient    1945 MRN [id number]   Location [facility] 2W/SW Attending Santo Cardenas MD   Hosp Day # 1 PCP Davis Francisco lung decortication; mechanical and chemical pleurodesis with intercostal nerve block multiple levels POD #1    Pulmonary toilet; IS  DVT prophylaxis; scds; will change lovenox to heparin Subcut; HX elevated CR CR today 1.99  Pain medication as needed  Incr left apical pneumothorax. * Some residual consolidation/atelectasis noted at the left base but overall improvement. * New area of consolidation/atelectasis noted at the right base not seen on January 27, 2018.  * Heart although enlarged appears smaller than

## 2018-02-02 ENCOUNTER — APPOINTMENT (OUTPATIENT)
Dept: ULTRASOUND IMAGING | Facility: HOSPITAL | Age: 73
DRG: 271 | End: 2018-02-02
Attending: HOSPITALIST
Payer: MEDICARE

## 2018-02-02 LAB
ANION GAP SERPL CALC-SCNC: 7 MMOL/L (ref 0–18)
BUN SERPL-MCNC: 48 MG/DL (ref 8–20)
BUN/CREAT SERPL: 15.7 (ref 10–20)
CALCIUM SERPL-MCNC: 8.2 MG/DL (ref 8.5–10.5)
CHLORIDE SERPL-SCNC: 104 MMOL/L (ref 95–110)
CO2 SERPL-SCNC: 21 MMOL/L (ref 22–32)
CREAT SERPL-MCNC: 3.05 MG/DL (ref 0.5–1.5)
CREAT UR-MCNC: 187.5 MG/DL
GLUCOSE SERPL-MCNC: 121 MG/DL (ref 70–99)
OSMOLALITY UR CALC.SUM OF ELEC: 288 MOSM/KG (ref 275–295)
OSMOLALITY UR: 324 MOSM/KG (ref 300–1100)
POTASSIUM SERPL-SCNC: 5.3 MMOL/L (ref 3.3–5.1)
SODIUM SERPL-SCNC: 132 MMOL/L (ref 136–144)
SODIUM UR-SCNC: <10 MMOL/L

## 2018-02-02 PROCEDURE — 76770 US EXAM ABDO BACK WALL COMP: CPT | Performed by: HOSPITALIST

## 2018-02-02 PROCEDURE — 99233 SBSQ HOSP IP/OBS HIGH 50: CPT | Performed by: HOSPITALIST

## 2018-02-02 NOTE — PHYSICAL THERAPY NOTE
PHYSICAL THERAPY EVALUATION - INPATIENT     Room Number: 230/230-A  Evaluation Date: 2/2/2018  Type of Evaluation: Initial  Physician Order: PT Eval and Treat    Presenting Problem: s/p partial pleurectomy, pericardial window, complete lung decorticati training;Gait training;Stair training;Strengthening;Patient education; Body mechanics; Endurance; Energy conservation  Rehab Potential : Fair  Frequency (Obs):  (3-5x/week)       PHYSICAL THERAPY MEDICAL/SOCIAL HISTORY     History related to current admission Neutropenia ([facility] [address]    • Occlusion and stenosis of carotid artery 12/2/2013   • Recent change in frequency of bowel movements    • Stroke [facility]) 1993   • UTI (urinary tract infection) 08/2017       Past Surgical History  Past Surgical History:  1994: CATH PER mmHg    AM-PAC '6-Clicks' INPATIENT SHORT FORM - BASIC MOBILITY  How much difficulty does the patient currently have. ..  -   Turning over in bed (including adjusting bedclothes, sheets and blankets)?: A Little   -   Sitting down on and standing up from a c Current Status Amb 45 ft w/ RW & CGA   Goal #4 Patient will negotiate 5 stairs/one curb w/ assistive device and supervision   Goal #4   Current Status NT   Goal #5 Patient to demonstrate independence with home activity/exercise instructions provided to p

## 2018-02-02 NOTE — OCCUPATIONAL THERAPY NOTE
Kailash Geller   OCCUPATIONAL THERAPY EVALUATION - INPATIENT     Room Number: 230/230-A  Evaluation Date: 2/2/2018  Type of Evaluation: Initial  Presenting Problem: s/p mini thoracteomy, pericardial window, lung decortication    Physician Order: IP Consult to Occupation RECOMMENDATIONS: Continued skilled therapy in supervised setting if patient does not achieve status close to functional baseline at d/c           PLAN            OCCUPATIONAL THERAPY MEDICAL/SOCIAL HISTORY     Problem List  Principal Problem:    Pleural ef \"Management:  PTCA (1994)\"  09-,01/2015: COLONOSCOPY  1/13/2017: EGD N/A      Comment: Procedure: ESOPHAGOGASTRODUODENOSCOPY (EGD);                  Surgeon: Jennifer Roldan MD;  Location:                [address] [place] [address] ENDOSCOPY  09-: Unknown Latin patient currently need…  -   Putting on and taking off regular lower body clothing?: A Little  -   Bathing (including washing, rinsing, drying)?: A Little  -   Toileting, which includes using toilet, bedpan or urinal? : A Little  -   Putting on and taking

## 2018-02-02 NOTE — PROGRESS NOTES
[facility] HOSP - [facility]    Cardiology Progress Note    Breonna Villatoro Patient Status:  Inpatient    1945 MRN [id number]   Location [facility] 2W/SW Attending Som Wu MD   Hosp Day # 2 PCP Sarita Rios MD     2018  Subject the last 72 hours.     Allergies:   No Known Allergies    Medications:    Current Facility-Administered Medications:  Isosorbide Mononitrate ER (IMDUR) 24 hr tab 30 mg 30 mg Oral Daily   Heparin Sodium (Porcine) 5000 UNIT/ML injection 5,000 Units 5,000 Unit Pericardial effusion     S/P thoracotomy      Plan:    1. Recurrent pleural effusion s/p decortication for trapped lung and pleurodesis  - chest tube in place management as per CT surgery.     2. Pericardial effusion s/p pericardial window  - per CT surgery

## 2018-02-02 NOTE — PLAN OF CARE
CARDIOVASCULAR - ADULT    • Maintains optimal cardiac output and hemodynamic stability Progressing    Monitor reveals A-fib at controlled rate, BP has been low, Labetalol and Hydralazine evening doses held. Encouraged oral fluids.  Urine out put has been lo

## 2018-02-02 NOTE — CONSULTS
[facility] [place]    Report of Consultation    Breonna Scotties Patient Status:  Inpatient    1945 MRN [id number]   Location [facility] 2W/SW Attending Som Wu MD   [facility] Day # 2 PCP Sarita Rios MD     Date of Admission: Surgical History:  1994: CATH PERCUTANEOUS  TRANSLUMINAL CORONARY ANGIO*      Comment: per NextGen:  \"Ischemic heart disease\";                 \"Management:  PTCA (1994)\"  09-,01/2015: COLONOSCOPY  1/13/2017: EGD N/A      Comment: Procedure: Darrian Ray 0.4 mg Intravenous Q30 Min PRN   HYDROcodone-acetaminophen (NORCO) 5-325 MG per tab 1 tablet 1 tablet Oral Q4H PRN   Or      HYDROcodone-acetaminophen (NORCO) 5-325 MG per tab 2 tablet 2 tablet Oral Q4H PRN   finasteride (PROSCAR) tab 5 mg 5 mg Oral Daily Temp 98.2 °F (36.8 °C) (Temporal)   Resp 20   Ht 6' 1\" (1.854 m)   Wt 210 lb 12.8 oz (95.6 kg)   SpO2 100%   BMI 27.81 kg/m²      Intake/Output Summary (Last 24 hours) at 02/02/18 1000  Last data filed at 02/02/18 0500   Gross per 24 hour   Intake

## 2018-02-02 NOTE — PROGRESS NOTES
[facility] AND [facility]  [facility] Cardiology Progress Note    [facility] Patient Status:  Inpatient    1945 MRN [id number]   Location [facility] 2W/SW Attending Paul Rodriguez MD   Hosp Day # 2 PCP Marilee Millan.  MD Blanca     74year old male s/p Ischemic heart disease     per NextGen:  \"Ischemic heart disease\";  \"Management:  PTCA (1994)\"   • Kidney disease 07/05/2013   • Kidney disorder    • Lipid screening 05-    per NextGen Order Module   • Neutropenia [facility])    • Occlusion and stenosi Alert and oriented x 3. No apparent distress. No respiratory or constitutional distress. HEENT: Normocephalic, anicteric sclera, neck supple. Neck: No JVD, carotids 2+, no bruits. Cardiac: Regular rate and rhythm.  S1, S2 normal. No murmur, pericardial r

## 2018-02-02 NOTE — PROGRESS NOTES
[place] [facility] - [facility]    Progress Note    Ric Hair Patient Status:  Inpatient    1945 MRN [id number]   Location [facility] 2W/SW Attending Franky Esteves MD   Hosp Day # 2 PCP Sheldon Andrews. MD Blanca     Subjective:  Pt.  Sitting in needed  Increase activity- oob to chair and ambulate   SCDs and Heparin SubQ prophylaxis DVT prevention   Plan to keep CT in place today; recent decortication/pleurodesis; no air leak noted  Decrease urine output with increase kidney function; Nephrology c

## 2018-02-02 NOTE — PROGRESS NOTES
[facility]  Hospitalist Progress  Note     Caroline Wall Patient Status:  Inpatient    1945  74year old CSN [id number]   Location 230230-A Attending Marquis Stacey MD   Hosp Day # 2 PCP Martell Gonzales.  MD Blanca     ASSESSMENT/PLAN judgment normal.     Labs:  Recent Labs   Lab  02/01/18   0446   RBC  3.07*   HGB  9.2*   HCT  29.0*   MCV  94.4   MCH  30.1   MCHC  31.9*   RDW  14.4   WBC  10.9   PLT  272     Recent Labs   Lab  02/01/18   0445  02/02/18   0422   GLU  168*  121*   BUN  4

## 2018-02-02 NOTE — CM/SW NOTE
BRANDEN spoke with therapy who are recommending rehab at [facility]. The pt had difficulty getting off the toilet, so he was apparently receptive to the idea of rehab.  BRANDEN met with the pt and provided the snf list. He was disappointed that this was being recommended, an

## 2018-02-03 ENCOUNTER — APPOINTMENT (OUTPATIENT)
Dept: GENERAL RADIOLOGY | Facility: HOSPITAL | Age: 73
DRG: 271 | End: 2018-02-03
Attending: CLINICAL NURSE SPECIALIST
Payer: MEDICARE

## 2018-02-03 LAB
ALBUMIN SERPL BCP-MCNC: 2.2 G/DL (ref 3.5–4.8)
ANION GAP SERPL CALC-SCNC: 10 MMOL/L (ref 0–18)
BASOPHILS # BLD: 0 K/UL (ref 0–0.2)
BASOPHILS NFR BLD: 0 %
BUN SERPL-MCNC: 53 MG/DL (ref 8–20)
BUN/CREAT SERPL: 17 (ref 10–20)
CALCIUM SERPL-MCNC: 8.3 MG/DL (ref 8.5–10.5)
CHLORIDE SERPL-SCNC: 99 MMOL/L (ref 95–110)
CO2 SERPL-SCNC: 19 MMOL/L (ref 22–32)
CREAT SERPL-MCNC: 3.11 MG/DL (ref 0.5–1.5)
EOSINOPHIL # BLD: 0.1 K/UL (ref 0–0.7)
EOSINOPHIL NFR BLD: 1 %
ERYTHROCYTE [DISTWIDTH] IN BLOOD BY AUTOMATED COUNT: 14.9 % (ref 11–15)
GLUCOSE SERPL-MCNC: 107 MG/DL (ref 70–99)
HCT VFR BLD AUTO: 25.9 % (ref 41–52)
HGB BLD-MCNC: 8.3 G/DL (ref 13.5–17.5)
LYMPHOCYTES # BLD: 0.3 K/UL (ref 1–4)
LYMPHOCYTES NFR BLD: 4 %
MAGNESIUM SERPL-MCNC: 1.6 MG/DL (ref 1.8–2.5)
MCH RBC QN AUTO: 30.1 PG (ref 27–32)
MCHC RBC AUTO-ENTMCNC: 32.1 G/DL (ref 32–37)
MCV RBC AUTO: 93.6 FL (ref 80–100)
MONOCYTES # BLD: 0.6 K/UL (ref 0–1)
MONOCYTES NFR BLD: 10 %
NEUTROPHILS # BLD AUTO: 5.4 K/UL (ref 1.8–7.7)
NEUTROPHILS NFR BLD: 85 %
OSMOLALITY UR CALC.SUM OF ELEC: 281 MOSM/KG (ref 275–295)
PHOSPHATE SERPL-MCNC: 4.6 MG/DL (ref 2.4–4.7)
PLATELET # BLD AUTO: 204 K/UL (ref 140–400)
PMV BLD AUTO: 8.4 FL (ref 7.4–10.3)
POTASSIUM SERPL-SCNC: 4.9 MMOL/L (ref 3.3–5.1)
RBC # BLD AUTO: 2.77 M/UL (ref 4.5–5.9)
SODIUM SERPL-SCNC: 128 MMOL/L (ref 136–144)
WBC # BLD AUTO: 6.3 K/UL (ref 4–11)

## 2018-02-03 PROCEDURE — 71045 X-RAY EXAM CHEST 1 VIEW: CPT | Performed by: CLINICAL NURSE SPECIALIST

## 2018-02-03 PROCEDURE — 99233 SBSQ HOSP IP/OBS HIGH 50: CPT | Performed by: HOSPITALIST

## 2018-02-03 RX ORDER — MAGNESIUM OXIDE 400 MG (241.3 MG MAGNESIUM) TABLET
400 TABLET ONCE
Status: COMPLETED | OUTPATIENT
Start: 2018-02-03 | End: 2018-02-03

## 2018-02-03 NOTE — PROGRESS NOTES
Pulmonary Progress Note        NAME: Bronson Singh - ROOM: 230/230-A - MRN: [id number] - Age: 74year old - : 1945    Past Medical History:   Diagnosis Date   • Anemia     per NextGen:  \"Anemia mild secondary to gastritis\"   • Anemia in chronic Subcutaneous 2 times per day   • [MAR Hold] doxycycline  500 mg Intrapleural Once   • finasteride  5 mg Oral Daily   • folic acid  657 mcg Oral Daily   • Labetalol HCl  200 mg Oral BID   • Pantoprazole Sodium  40 mg Oral Daily   • Alfuzosin HCl ER  10 mg O

## 2018-02-03 NOTE — PROGRESS NOTES
[facility]    Progress Note    Rosemary Mccauley Patient Status:  Inpatient    1945 MRN [id number]   Location [facility] 2W/SW Attending Maria Victoria Dawson, [address] [facility] Day # 3 PCP Blas Rios MD       Subjective:   Tab Gar mg Oral Daily   Alfuzosin HCl ER (UROXATRAL) 24 hr tab 10 mg 10 mg Oral Daily with breakfast         Lab Results  Component Value Date   WBC 6.3 02/03/2018   HGB 8.3 (L) 02/03/2018   HCT 25.9 (L) 02/03/2018    02/03/2018   CREATSERUM 3.11 (H) 02/03/ surgery     Acute on chronic renal failure stage III. Creatinine 3.1. Usual baseline is around 1.4. Urine output 700 in last 24 hours. No major nephrotoxic medications. No recent contrast.  He did have an episode of hypotension over night.   No signifi

## 2018-02-03 NOTE — PROGRESS NOTES
[facility]    Progress Note    Erika Walters Patient Status:  Inpatient    1945 MRN [id number]   Location [facility] 2W/SW Attending Adriane Logan MD   [address] Day # 3 PCP Stewart Rios MD     Subjective:  Pt.  Sitting in PLEURECTOMY/DECORTICATION/PLEURODESIS/PERICARDIAL WINDOW POD # 3  Encourage pulm toilet: IS   Pain meds as needed  Increase activity- oob to chair and ambulate   SCDs and Heparin SubQ prophylaxis DVT prevention   Plan to keep CT in place today; will place

## 2018-02-03 NOTE — PLAN OF CARE
CARDIOVASCULAR - ADULT    • Maintains optimal cardiac output and hemodynamic stability Progressing    SBP has remained above 100, medications have been adjusted, Hydralazine discontinued. Monitor remains chronic A-fib with controlled rate.  Urine output is

## 2018-02-03 NOTE — PROGRESS NOTES
[facility]    Cardiology Progress Note    Nohemy Oviedo Patient Status:  Inpatient    1945 MRN [id number]   Location [facility] 2W/SW Attending Yasir Romano MD   [facility] Day # 3 PCP Wen Perez.  MD Blanca     2018  Subject --   4.6   GLU  168*  121*  107*       Recent Labs   Lab  02/03/18   0415   ALB  2.2*       No results for input(s): TROP in the last 72 hours.     Allergies:   No Known Allergies    Medications:    Current Facility-Administered Medications:  magnesium oxid effusion     S/P thoracotomy     Acute renal failure superimposed on stage 3 chronic kidney disease (HCC)     Pre-op testing      Plan:  1.  Recurrent pleural effusion s/p decortication for trapped lung and pleurodesis  - chest tube in place management as p

## 2018-02-03 NOTE — PHYSICAL THERAPY NOTE
PHYSICAL THERAPY TREATMENT NOTE - INPATIENT    Room Number: 998/931-B       Presenting Problem: s/p partial pleurectomy, pericardial window, complete lung decortication    Problem List  Principal Problem:    Pleural effusion  Active Problems:    Hypertens Standing: Fair -    ACTIVITY TOLERANCE  See above    AM-PAC '6-Clicks' INPATIENT SHORT FORM - BASIC MOBILITY  How much difficulty does the patient currently have. ..  -   Turning over in bed (including adjusting bedclothes, sheets and blankets)?: A Little lines/chest tube and postural alignment. Appears to hold breathe while ambulating. Also did 3 minutes of stance tasks at sink with CGA. See vitals above.     Goal #4 Patient will negotiate 5 stairs/one curb w/ assistive device and supervision   Goal #4   Cu

## 2018-02-03 NOTE — PROGRESS NOTES
[place] [facility] - [facility]    Progress Note    Malia Rivera Patient Status:  Inpatient    1945 MRN [id number]   Location [facility] 2W/SW Attending Cathi Dukes, [address] [facility] Day # 3 PCP Sunshine Keyes.  MD Blanca       Subjective:   Robbie Garcia 112*  104  99   CO2  21*  21*  19*          Xr Chest Ap Portable  (cpt=71045)    Result Date: 2/3/2018  CONCLUSION:  1. Left upper lobe chest tube. No well-defined left-sided pneumothorax.  Mild bibasilar airspace disease although improved somewhat on the r

## 2018-02-03 NOTE — OCCUPATIONAL THERAPY NOTE
OCCUPATIONAL THERAPY TREATMENT NOTE - INPATIENT     Room Number: 921/460-S          Presenting Problem: s/p mini thoracteomy, pericardial window, lung decortication    Problem List  Principal Problem:    Pleural effusion  Active Problems:    Hypertension, training;Patient/Family education;Patient/Family training;Equipment eval/education      SUBJECTIVE  \"I'm ready. \"     OBJECTIVE  Precautions: Chest tube    WEIGHT BEARING RESTRICTION                   PAIN ASSESSMENT  Rating:  (Did not rate on pain scale; Goals:  Patients self stated goal is: to return home       Patient will complete functional transfer with Mod I with LRD   Comment: Progressing-CGA w/RW    Patient will complete toileting with Mod I   Comment: n/t; did not need to go at this time    Patien

## 2018-02-04 ENCOUNTER — APPOINTMENT (OUTPATIENT)
Dept: GENERAL RADIOLOGY | Facility: HOSPITAL | Age: 73
DRG: 271 | End: 2018-02-04
Attending: CLINICAL NURSE SPECIALIST
Payer: MEDICARE

## 2018-02-04 LAB
ALBUMIN SERPL BCP-MCNC: 2.1 G/DL (ref 3.5–4.8)
ANION GAP SERPL CALC-SCNC: 5 MMOL/L (ref 0–18)
BASOPHILS # BLD: 0 K/UL (ref 0–0.2)
BASOPHILS NFR BLD: 0 %
BUN SERPL-MCNC: 54 MG/DL (ref 8–20)
BUN/CREAT SERPL: 21.5 (ref 10–20)
CALCIUM SERPL-MCNC: 8 MG/DL (ref 8.5–10.5)
CHLORIDE SERPL-SCNC: 104 MMOL/L (ref 95–110)
CO2 SERPL-SCNC: 20 MMOL/L (ref 22–32)
CREAT SERPL-MCNC: 2.51 MG/DL (ref 0.5–1.5)
EOSINOPHIL # BLD: 0 K/UL (ref 0–0.7)
EOSINOPHIL NFR BLD: 1 %
ERYTHROCYTE [DISTWIDTH] IN BLOOD BY AUTOMATED COUNT: 14 % (ref 11–15)
GLUCOSE SERPL-MCNC: 115 MG/DL (ref 70–99)
HCT VFR BLD AUTO: 25.3 % (ref 41–52)
HGB BLD-MCNC: 8.1 G/DL (ref 13.5–17.5)
LYMPHOCYTES # BLD: 0.2 K/UL (ref 1–4)
LYMPHOCYTES NFR BLD: 3 %
MAGNESIUM SERPL-MCNC: 1.6 MG/DL (ref 1.8–2.5)
MCH RBC QN AUTO: 29.8 PG (ref 27–32)
MCHC RBC AUTO-ENTMCNC: 32.1 G/DL (ref 32–37)
MCV RBC AUTO: 93 FL (ref 80–100)
MONOCYTES # BLD: 0.6 K/UL (ref 0–1)
MONOCYTES NFR BLD: 11 %
NEUTROPHILS # BLD AUTO: 4.9 K/UL (ref 1.8–7.7)
NEUTROPHILS NFR BLD: 85 %
OSMOLALITY UR CALC.SUM OF ELEC: 284 MOSM/KG (ref 275–295)
PHOSPHATE SERPL-MCNC: 3.9 MG/DL (ref 2.4–4.7)
PLATELET # BLD AUTO: 208 K/UL (ref 140–400)
PMV BLD AUTO: 8.3 FL (ref 7.4–10.3)
POTASSIUM SERPL-SCNC: 4.2 MMOL/L (ref 3.3–5.1)
RBC # BLD AUTO: 2.73 M/UL (ref 4.5–5.9)
SODIUM SERPL-SCNC: 129 MMOL/L (ref 136–144)
WBC # BLD AUTO: 5.8 K/UL (ref 4–11)

## 2018-02-04 PROCEDURE — 71046 X-RAY EXAM CHEST 2 VIEWS: CPT | Performed by: CLINICAL NURSE SPECIALIST

## 2018-02-04 PROCEDURE — 99233 SBSQ HOSP IP/OBS HIGH 50: CPT | Performed by: HOSPITALIST

## 2018-02-04 RX ORDER — POLYETHYLENE GLYCOL 3350 17 G/17G
17 POWDER, FOR SOLUTION ORAL DAILY
Status: DISCONTINUED | OUTPATIENT
Start: 2018-02-04 | End: 2018-02-05

## 2018-02-04 RX ORDER — MAGNESIUM OXIDE 400 MG (241.3 MG MAGNESIUM) TABLET
400 TABLET ONCE
Status: COMPLETED | OUTPATIENT
Start: 2018-02-04 | End: 2018-02-04

## 2018-02-04 NOTE — PLAN OF CARE
CARDIOVASCULAR - ADULT    • Maintains optimal cardiac output and hemodynamic stability Progressing    Afib heart rate controlled and vitals are stable.       PAIN - ADULT    • Verbalizes/displays adequate comfort level or patient's stated pain goal Progress

## 2018-02-04 NOTE — PROGRESS NOTES
[facility]    Progress Note    Equilla Sor Patient Status:  Inpatient    1945 MRN [id number]   Location [facility] 2W/SW Attending Chris Murray MD   [facility] Day # 4 PCP Shelley Rios MD       Subjective:   Equilla Sor (cpt=71046)    Result Date: 2/4/2018  CONCLUSION:  1. Mild cardiomegaly. Tortuous aorta. 2. Left thoracotomy changes post pleurodesis. Small residual apical and septal moderate pneumothorax measuring approximately 5-10%.  2 left-sided chest tubes are Denise

## 2018-02-04 NOTE — PLAN OF CARE
Problem: Patient Centered Care  Goal: Patient preferences are identified and integrated in the patient's plan of care  Interventions:  - What would you like us to know as we care for you? Patient requesting to be discharged to home without home health.  Kyleigh Bennett Maintains optimal cardiac output and hemodynamic stability  INTERVENTIONS:  - Monitor vital signs, rhythm, and trends  - Monitor for bleeding, hypotension and signs of decreased cardiac output  - Evaluate effectiveness of vasoactive medications to optimize Implement wound care per orders  - Initiate isolation precautions as appropriate  - Initiate Pressure Ulcer prevention bundle as indicated   Outcome: Progressing      Comments: VS stable, chest tubes removed today.  Small amount of pink/serous drainage note

## 2018-02-04 NOTE — PROGRESS NOTES
[place] [facility] - [facility]    Progress Note    Odalis Anderson Patient Status:  Inpatient    1945 MRN [id number]   Location [facility] 2W/SW Attending Darlene Cisse MD   [facility] Day # 4 PCP Cristina Barraza. MD Blanca     Subjective:  Pt.  Sitting in PLEURECTOMY/DECORTICATION/PLEURODESIS/PERICARDIAL WINDOW POD # 4  Encourage pulm toilet: IS   Pain meds as needed  Increase activity- oob to chair and ambulate   SCDs and Heparin SubQ prophylaxis DVT prevention   Will review CXR today for possible removal

## 2018-02-04 NOTE — PROGRESS NOTES
[facility]  Hospitalist Progress  Note     Juan Antonio Kirkland Patient Status:  Inpatient    1945  74year old [facility] [id number]   Location 230/230-A Attending Eros Gomes MD   Hosp Day # 4 PCP Radha Eric.  MD Blanca     ASSESSMENT/PLAN 02/03/18   0415  02/04/18   0407   RBC  2.77*  2.73*   HGB  8.3*  8.1*   HCT  25.9*  25.3*   MCV  93.6  93.0   MCH  30.1  29.8   MCHC  32.1  32.1   RDW  14.9  14.0   WBC  6.3  5.8   PLT  204  208     Recent Labs   Lab  02/02/18   0422  02/03/18   0415  02

## 2018-02-05 ENCOUNTER — APPOINTMENT (OUTPATIENT)
Dept: GENERAL RADIOLOGY | Facility: HOSPITAL | Age: 73
DRG: 271 | End: 2018-02-05
Attending: CLINICAL NURSE SPECIALIST
Payer: MEDICARE

## 2018-02-05 VITALS
HEART RATE: 57 BPM | HEIGHT: 73 IN | RESPIRATION RATE: 18 BRPM | BODY MASS INDEX: 28.19 KG/M2 | TEMPERATURE: 97 F | SYSTOLIC BLOOD PRESSURE: 133 MMHG | DIASTOLIC BLOOD PRESSURE: 73 MMHG | OXYGEN SATURATION: 100 % | WEIGHT: 212.69 LBS

## 2018-02-05 LAB
ALBUMIN SERPL BCP-MCNC: 2.1 G/DL (ref 3.5–4.8)
ANION GAP SERPL CALC-SCNC: 5 MMOL/L (ref 0–18)
BASOPHILS # BLD: 0 K/UL (ref 0–0.2)
BASOPHILS NFR BLD: 0 %
BUN SERPL-MCNC: 55 MG/DL (ref 8–20)
BUN/CREAT SERPL: 25.6 (ref 10–20)
CALCIUM SERPL-MCNC: 8.2 MG/DL (ref 8.5–10.5)
CHLORIDE SERPL-SCNC: 106 MMOL/L (ref 95–110)
CO2 SERPL-SCNC: 20 MMOL/L (ref 22–32)
CREAT SERPL-MCNC: 2.15 MG/DL (ref 0.5–1.5)
EOSINOPHIL # BLD: 0.1 K/UL (ref 0–0.7)
EOSINOPHIL NFR BLD: 1 %
ERYTHROCYTE [DISTWIDTH] IN BLOOD BY AUTOMATED COUNT: 13.9 % (ref 11–15)
GLUCOSE SERPL-MCNC: 102 MG/DL (ref 70–99)
HCT VFR BLD AUTO: 25.7 % (ref 41–52)
HGB BLD-MCNC: 8.4 G/DL (ref 13.5–17.5)
LYMPHOCYTES # BLD: 0.3 K/UL (ref 1–4)
LYMPHOCYTES NFR BLD: 6 %
MCH RBC QN AUTO: 30.5 PG (ref 27–32)
MCHC RBC AUTO-ENTMCNC: 32.9 G/DL (ref 32–37)
MCV RBC AUTO: 92.9 FL (ref 80–100)
MONOCYTES # BLD: 0.7 K/UL (ref 0–1)
MONOCYTES NFR BLD: 13 %
NEUTROPHILS # BLD AUTO: 4 K/UL (ref 1.8–7.7)
NEUTROPHILS NFR BLD: 79 %
OSMOLALITY UR CALC.SUM OF ELEC: 287 MOSM/KG (ref 275–295)
PHOSPHATE SERPL-MCNC: 2.9 MG/DL (ref 2.4–4.7)
PLATELET # BLD AUTO: 228 K/UL (ref 140–400)
PMV BLD AUTO: 8.5 FL (ref 7.4–10.3)
POTASSIUM SERPL-SCNC: 4.5 MMOL/L (ref 3.3–5.1)
RBC # BLD AUTO: 2.76 M/UL (ref 4.5–5.9)
SODIUM SERPL-SCNC: 131 MMOL/L (ref 136–144)
WBC # BLD AUTO: 5 K/UL (ref 4–11)

## 2018-02-05 PROCEDURE — 71046 X-RAY EXAM CHEST 2 VIEWS: CPT | Performed by: CLINICAL NURSE SPECIALIST

## 2018-02-05 PROCEDURE — 99239 HOSP IP/OBS DSCHRG MGMT >30: CPT | Performed by: HOSPITALIST

## 2018-02-05 RX ORDER — HYDROCODONE BITARTRATE AND ACETAMINOPHEN 5; 325 MG/1; MG/1
2 TABLET ORAL EVERY 4 HOURS PRN
Qty: 30 TABLET | Refills: 0 | Status: SHIPPED | OUTPATIENT
Start: 2018-02-05 | End: 2018-05-16

## 2018-02-05 RX ORDER — ATORVASTATIN CALCIUM 40 MG/1
40 TABLET, FILM COATED ORAL NIGHTLY
Status: DISCONTINUED | OUTPATIENT
Start: 2018-02-05 | End: 2018-02-05

## 2018-02-05 NOTE — CM/SW NOTE
BRANDEN spoke with MD who anticipates the pt will return home today. No home health as he is already familiar with the pleurx catheter and is refusing snf placement. BRANDEN will assist as indicated by MD/RN indication.     marissa arrieta,MIN [id number]

## 2018-02-05 NOTE — PROGRESS NOTES
Pulmonary Progress Note     Assessment / Plan:  1. Recurrent pleural effusion s/p decortication for trapped lung and pleurodesis - doing well. On RA. Path benign   - start PleurX draining.  Would drain at least every other day for now to keep pleural space

## 2018-02-05 NOTE — PROGRESS NOTES
[facility]    Progress Note    Santa Green Patient Status:  Inpatient    1945 MRN [id number]   Location [facility] 2W/SW Attending Sal Lu MD   [facility] Day # 5 PCP Bob Ohara.  MD Blanca       Subjective:   Santa Green GFRAA  24*  31*  37*   GFRNAA  20*  25*  30*   CA  8.3*  8.0*  8.2*   NA  128*  129*  131*   K  4.9  4.2  4.5   CL  99  104  106   CO2  19*  20*  20*          Xr Chest Pa + Lat Chest (cpt=71046)    Result Date: 2/5/2018  CONCLUSION:  At least 2 left-side

## 2018-02-05 NOTE — PHYSICAL THERAPY NOTE
PHYSICAL THERAPY TREATMENT NOTE - INPATIENT    Room Number: 486/258-J       Presenting Problem: s/p partial pleurectomy, pericardial window, complete lung decortication    Problem List  Principal Problem:    Pleural effusion  Active Problems:    Hypertens stay with him for one week. Recommend RW for amb and per pt he owns a RW.         DISCHARGE RECOMMENDATIONS  PT Discharge Recommendations: 24 hour care/supervision;Cont skilled therapy in a supervised setting;Home with home health PT     PLAN  PT Treatment RW)  Stoop/Curb Assistance:  (was able to negotiate 5 steps with HR/CGAx1)       Patient End of Session: Up in chair;Needs met;Call light within reach;RN aware of session/findings; All patient questions and concerns addressed    CURRENT GOALS      Goals to

## 2018-02-05 NOTE — PROGRESS NOTES
[facility] AND [facility]  Progress Note    [facility] Patient Status:  Inpatient    1945 MRN [id number]   Location [facility] 2W/SW Attending Paul Rodriguez MD   [address] Day # 5 PCP Marilee Millan. MD Blanca     Assessment:    1.  S/P lung decortication found for: TROP, CKMB     Medications:    • PEG 3350  17 g Oral Daily   • rivaroxaban  15 mg Oral Daily with food   • Isosorbide Mononitrate ER  30 mg Oral Daily   • [MAR Hold] doxycycline  500 mg Intrapleural Once   • finasteride  5 mg Oral Daily   • foli

## 2018-02-05 NOTE — OCCUPATIONAL THERAPY NOTE
OCCUPATIONAL THERAPY TREATMENT NOTE - INPATIENT     Room Number: 222/971-E     Presenting Problem: s/p mini thoracteomy, pericardial window, lung decortication    Problem List  Principal Problem:    Pleural effusion  Active Problems:    Hypertension, benig transfer training; Endurance training;Patient/Family education;Equipment eval/education    SUBJECTIVE  \"I will be fine at home, I have people all around me to help. \"    OBJECTIVE  Precautions: Chest tube (chest tube removed this session)    WEIGHT BEARING Goals:     Patient will complete functional transfer with Mod I with LRD   Comment: Progressing- SBA    Patient will complete toileting with Mod I   Comment: n/t; did not need to go at this time    Patient will tolerate standing for 15 minutes in prep for

## 2018-02-05 NOTE — PLAN OF CARE
Discharged home per wc to door. Ambulating with steady gait. Anxious to go home. Pleurex catheter drained prior to dc. Pt states he has had a pleurex at home for the past 2 months and knows how to care for it.    States he has supplies at home as well inc

## 2018-02-05 NOTE — PROGRESS NOTES
[facility]    Progress Note    Iliana Lopes Patient Status:  Inpatient    1945 MRN [id number]   Location [facility] 2W/SW Attending Angela Rubin MD   [facility] Day # 5 PCP Bessy Callahan. MD Blanca     Subjective:  Pt.  Sitting up WINDOW POD # 5  Encourage pulm toilet: IS   Pain meds as needed  Increase activity- oob to chair and ambulates in hallway    SCDs and Heparin SubQ prophylaxis DVT prevention   Reviewed CXR   Kidney function continues to improve. Roxanne removed yesterday.   H

## 2018-02-05 NOTE — DISCHARGE SUMMARY
[place] HOSPITALIST  DISCHARGE SUMMARY     Erika Walters Patient Status:  Inpatient    1945 MRN [id number]   Location [facility] 2W/SW Attending Adriane Logan MD   [facility] Day # 5 PCP Stewart Rios MD     DATE OF ADMISSION: 2018  DA details   HYDROcodone-acetaminophen 5-325 MG Tabs  Commonly known as:  NORCO      Take 2 tablets by mouth every 4 (four) hours as needed for Pain.    Quantity:  30 tablet  Refills:  0        CONTINUE taking these medications      Instructions Prescription d TAKE 1 CAPSULE (0.4 MG TOTAL) BY MOUTH DAILY. TAKE 1/2 HOUR FOLLOWING THE SAME MEAL EACH DAY   Quantity:  30 capsule  Refills:  11     Vitamin B-12 1000 MCG Tabs  Commonly known as:  VITAMIN B12      Take  by mouth.  take 1 by Oral route in the morning   Re

## 2018-02-06 ENCOUNTER — PATIENT OUTREACH (OUTPATIENT)
Dept: CASE MANAGEMENT | Age: 73
End: 2018-02-06

## 2018-02-06 ENCOUNTER — TELEPHONE (OUTPATIENT)
Dept: INTERNAL MEDICINE UNIT | Facility: HOSPITAL | Age: 73
End: 2018-02-06

## 2018-02-06 NOTE — PROGRESS NOTES
Initial Post Discharge Follow Up   Discharge Date: 2/5/18  Contact Date: 2/6/2018    Consent Verification:  Assessment Completed With: Patient  HIPAA Verified?   Yes    Discharge Dx:     Recurrent pleural effusion  Acute on chronic renal failure stage III TOTAL) BY MOUTH DAILY. TAKE 1/2 HOUR FOLLOWING THE SAME MEAL EACH DAY Disp: 30 capsule Rfl: 11   Labetalol HCl 200 MG Oral Tab Take 200 mg by mouth 2 (two) times daily.  Disp:  Rfl:    indapamide 1.25 MG Oral Tab Take one every other day for BP and fluid re Sukumar Cuevas MD [facility]. Pck 125 (EMA Della)    Feb 14, 2018 12:30 PM CST Post Op with Tanya Telles [facility], [facility])    Mar 19, 2018 10:30 AM CDT Exam - Established wi notify MD immediately. Pt verbalized understanding. Reminded of f/u w Dr Damon Fairchild on 2/8/18 and TCM apt w PCP on 2/13/18. Pt verbalized understanding and denied any further needs at this time.      [x]  Discharge Summary, Discharge medications reviewed/dis

## 2018-02-13 ENCOUNTER — OFFICE VISIT (OUTPATIENT)
Dept: INTERNAL MEDICINE CLINIC | Facility: CLINIC | Age: 73
End: 2018-02-13

## 2018-02-13 VITALS
BODY MASS INDEX: 27.96 KG/M2 | HEIGHT: 73 IN | OXYGEN SATURATION: 98 % | TEMPERATURE: 98 F | DIASTOLIC BLOOD PRESSURE: 72 MMHG | HEART RATE: 60 BPM | SYSTOLIC BLOOD PRESSURE: 114 MMHG | WEIGHT: 211 LBS

## 2018-02-13 DIAGNOSIS — I31.3 PERICARDIAL EFFUSION: Primary | ICD-10-CM

## 2018-02-13 DIAGNOSIS — J90 PLEURAL EFFUSION: ICD-10-CM

## 2018-02-13 DIAGNOSIS — I25.9 CHRONIC ISCHEMIC HEART DISEASE: ICD-10-CM

## 2018-02-13 PROCEDURE — 99214 OFFICE O/P EST MOD 30 MIN: CPT | Performed by: INTERNAL MEDICINE

## 2018-02-13 PROCEDURE — G0463 HOSPITAL OUTPT CLINIC VISIT: HCPCS | Performed by: INTERNAL MEDICINE

## 2018-02-13 NOTE — PROGRESS NOTES
Gokul Craig is a 74year old male. HPI:   1. Pericardial effusion  He had mini thoracotomy, left lung decortication and pleurodesis and pericardial window done on 1/31 Dr Kierra Adams - fluid analysis no diagnosis and no malignancy.  He is only draining a TAKE ONE TABLET BY MOUTH EVERY DAY Disp: 90 tablet Rfl: 3   SIMVASTATIN 40 MG Oral Tab TAKE ONE TABLET BY MOUTH EVERY DAY Disp: 90 tablet Rfl: 3   PANTOPRAZOLE SODIUM 40 MG Oral Tab EC TAKE ONE TABLET BY MOUTH EVERY DAY Disp: 90 tablet Rfl: 3   DUTASTERIDE Packs/day: 1.00      Years: 45.00        Types: Cigarettes     Start date: 10/3/1958     Quit date: 1/1/1993  Smokeless tobacco: Never Used                      Alcohol use: Yes           3.6 oz/we

## 2018-02-21 RX ORDER — PANTOPRAZOLE SODIUM 40 MG/1
TABLET, DELAYED RELEASE ORAL
Qty: 90 TABLET | Refills: 3 | Status: SHIPPED | OUTPATIENT
Start: 2018-02-21 | End: 2019-02-11

## 2018-02-26 ENCOUNTER — TELEPHONE (OUTPATIENT)
Dept: INTERNAL MEDICINE CLINIC | Facility: CLINIC | Age: 73
End: 2018-02-26

## 2018-02-26 ENCOUNTER — LAB ENCOUNTER (OUTPATIENT)
Dept: LAB | Facility: HOSPITAL | Age: 73
End: 2018-02-26
Attending: INTERNAL MEDICINE
Payer: MEDICARE

## 2018-02-26 ENCOUNTER — HOSPITAL ENCOUNTER (OUTPATIENT)
Dept: GENERAL RADIOLOGY | Facility: HOSPITAL | Age: 73
Discharge: HOME | End: 2018-02-26
Attending: INTERNAL MEDICINE
Payer: MEDICARE

## 2018-02-26 DIAGNOSIS — I31.3 PERICARDIAL EFFUSION: ICD-10-CM

## 2018-02-26 DIAGNOSIS — D64.9 ANEMIA, UNSPECIFIED TYPE: Primary | ICD-10-CM

## 2018-02-26 DIAGNOSIS — J90 RECURRENT LEFT PLEURAL EFFUSION: ICD-10-CM

## 2018-02-26 LAB
ALBUMIN SERPL BCP-MCNC: 2.5 G/DL (ref 3.5–4.8)
ANION GAP SERPL CALC-SCNC: 6 MMOL/L (ref 0–18)
BASOPHILS # BLD: 0 K/UL (ref 0–0.2)
BASOPHILS NFR BLD: 1 %
BUN SERPL-MCNC: 36 MG/DL (ref 8–20)
BUN/CREAT SERPL: 22.9 (ref 10–20)
CALCIUM SERPL-MCNC: 8.8 MG/DL (ref 8.5–10.5)
CHLORIDE SERPL-SCNC: 111 MMOL/L (ref 95–110)
CO2 SERPL-SCNC: 24 MMOL/L (ref 22–32)
CREAT SERPL-MCNC: 1.57 MG/DL (ref 0.5–1.5)
EOSINOPHIL # BLD: 0.1 K/UL (ref 0–0.7)
EOSINOPHIL NFR BLD: 2 %
ERYTHROCYTE [DISTWIDTH] IN BLOOD BY AUTOMATED COUNT: 15.5 % (ref 11–15)
GLUCOSE SERPL-MCNC: 152 MG/DL (ref 70–99)
HCT VFR BLD AUTO: 24.6 % (ref 41–52)
HGB BLD-MCNC: 7.8 G/DL (ref 13.5–17.5)
LYMPHOCYTES # BLD: 0.4 K/UL (ref 1–4)
LYMPHOCYTES NFR BLD: 8 %
MCH RBC QN AUTO: 28.6 PG (ref 27–32)
MCHC RBC AUTO-ENTMCNC: 32 G/DL (ref 32–37)
MCV RBC AUTO: 89.6 FL (ref 80–100)
MONOCYTES # BLD: 0.7 K/UL (ref 0–1)
MONOCYTES NFR BLD: 12 %
NEUTROPHILS # BLD AUTO: 4.1 K/UL (ref 1.8–7.7)
NEUTROPHILS NFR BLD: 77 %
OSMOLALITY UR CALC.SUM OF ELEC: 303 MOSM/KG (ref 275–295)
PHOSPHATE SERPL-MCNC: 3.5 MG/DL (ref 2.4–4.7)
PLATELET # BLD AUTO: 289 K/UL (ref 140–400)
PMV BLD AUTO: 8.2 FL (ref 7.4–10.3)
POTASSIUM SERPL-SCNC: 4.7 MMOL/L (ref 3.3–5.1)
RBC # BLD AUTO: 2.74 M/UL (ref 4.5–5.9)
SODIUM SERPL-SCNC: 141 MMOL/L (ref 136–144)
WBC # BLD AUTO: 5.3 K/UL (ref 4–11)

## 2018-02-26 PROCEDURE — 36415 COLL VENOUS BLD VENIPUNCTURE: CPT

## 2018-02-26 PROCEDURE — 85025 COMPLETE CBC W/AUTO DIFF WBC: CPT

## 2018-02-26 PROCEDURE — 71045 X-RAY EXAM CHEST 1 VIEW: CPT | Performed by: INTERNAL MEDICINE

## 2018-02-26 PROCEDURE — 80069 RENAL FUNCTION PANEL: CPT

## 2018-02-26 NOTE — TELEPHONE ENCOUNTER
Hgb 7.8; c/o mild hemorrhoidal bleeding; on ferrous sulfate 325 mg po BID; on ASA 81 mg po qD, and Xarelto 15 mg po qD; denies angina or SOB; pt declines PRBC transfusion; advise increase to ferrous sulfate 325 mg po TID; advise repeat CBC 1 week; pt dixon

## 2018-03-01 ENCOUNTER — TELEPHONE (OUTPATIENT)
Dept: INTERNAL MEDICINE CLINIC | Facility: CLINIC | Age: 73
End: 2018-03-01

## 2018-03-01 DIAGNOSIS — D64.9 ANEMIA, UNSPECIFIED TYPE: Primary | ICD-10-CM

## 2018-03-01 RX ORDER — PREDNISONE 1 MG/1
TABLET ORAL
Qty: 50 TABLET | Refills: 0 | Status: SHIPPED | OUTPATIENT
Start: 2018-03-01 | End: 2018-03-27 | Stop reason: ALTCHOICE

## 2018-03-02 ENCOUNTER — HOSPITAL ENCOUNTER (EMERGENCY)
Facility: HOSPITAL | Age: 73
Discharge: HOME | End: 2018-03-02
Attending: EMERGENCY MEDICINE
Payer: MEDICARE

## 2018-03-02 ENCOUNTER — APPOINTMENT (OUTPATIENT)
Dept: CT IMAGING | Facility: HOSPITAL | Age: 73
End: 2018-03-02
Attending: EMERGENCY MEDICINE
Payer: MEDICARE

## 2018-03-02 ENCOUNTER — APPOINTMENT (OUTPATIENT)
Dept: NUCLEAR MEDICINE | Facility: HOSPITAL | Age: 73
End: 2018-03-02
Attending: EMERGENCY MEDICINE
Payer: MEDICARE

## 2018-03-02 ENCOUNTER — APPOINTMENT (OUTPATIENT)
Dept: GENERAL RADIOLOGY | Facility: HOSPITAL | Age: 73
End: 2018-03-02
Attending: EMERGENCY MEDICINE
Payer: MEDICARE

## 2018-03-02 VITALS
OXYGEN SATURATION: 99 % | RESPIRATION RATE: 20 BRPM | HEART RATE: 62 BPM | TEMPERATURE: 98 F | BODY MASS INDEX: 27 KG/M2 | WEIGHT: 205 LBS | DIASTOLIC BLOOD PRESSURE: 88 MMHG | SYSTOLIC BLOOD PRESSURE: 172 MMHG

## 2018-03-02 DIAGNOSIS — D64.9 CHRONIC ANEMIA: ICD-10-CM

## 2018-03-02 DIAGNOSIS — R06.00 DYSPNEA, UNSPECIFIED TYPE: Primary | ICD-10-CM

## 2018-03-02 DIAGNOSIS — R91.8 OPACITY OF LUNG ON IMAGING STUDY: ICD-10-CM

## 2018-03-02 DIAGNOSIS — J90 BILATERAL PLEURAL EFFUSION: ICD-10-CM

## 2018-03-02 LAB
ANION GAP SERPL CALC-SCNC: 11 MMOL/L (ref 0–18)
ANTIBODY SCREEN: NEGATIVE
BASOPHILS # BLD: 0 K/UL (ref 0–0.2)
BASOPHILS NFR BLD: 1 %
BUN SERPL-MCNC: 31 MG/DL (ref 8–20)
BUN/CREAT SERPL: 19.9 (ref 10–20)
CALCIUM SERPL-MCNC: 9 MG/DL (ref 8.5–10.5)
CHLORIDE SERPL-SCNC: 107 MMOL/L (ref 95–110)
CO2 SERPL-SCNC: 23 MMOL/L (ref 22–32)
CREAT SERPL-MCNC: 1.56 MG/DL (ref 0.5–1.5)
D DIMER PPP FEU-MCNC: >4 MCG/ML (ref ?–0.74)
EOSINOPHIL # BLD: 0.1 K/UL (ref 0–0.7)
EOSINOPHIL NFR BLD: 2 %
ERYTHROCYTE [DISTWIDTH] IN BLOOD BY AUTOMATED COUNT: 15.5 % (ref 11–15)
ERYTHROCYTE [SEDIMENTATION RATE] IN BLOOD: 83 MM/HR (ref 0–20)
FERRITIN SERPL IA-MCNC: 98 NG/ML (ref 24–336)
GLUCOSE SERPL-MCNC: 95 MG/DL (ref 70–99)
HCT VFR BLD AUTO: 26.3 % (ref 41–52)
HGB BLD-MCNC: 8.4 G/DL (ref 13.5–17.5)
LACTATE SERPL-SCNC: 1 MMOL/L (ref 0.5–2.2)
LYMPHOCYTES # BLD: 0.4 K/UL (ref 1–4)
LYMPHOCYTES NFR BLD: 7 %
MAGNESIUM SERPL-MCNC: 1.4 MG/DL (ref 1.8–2.5)
MCH RBC QN AUTO: 28.2 PG (ref 27–32)
MCHC RBC AUTO-ENTMCNC: 32.1 G/DL (ref 32–37)
MCV RBC AUTO: 88.1 FL (ref 80–100)
MONOCYTES # BLD: 0.5 K/UL (ref 0–1)
MONOCYTES NFR BLD: 9 %
NEUTROPHILS # BLD AUTO: 4.4 K/UL (ref 1.8–7.7)
NEUTROPHILS NFR BLD: 81 %
OSMOLALITY UR CALC.SUM OF ELEC: 298 MOSM/KG (ref 275–295)
PLATELET # BLD AUTO: 276 K/UL (ref 140–400)
PMV BLD AUTO: 7.5 FL (ref 7.4–10.3)
POTASSIUM SERPL-SCNC: 4.5 MMOL/L (ref 3.3–5.1)
RBC # BLD AUTO: 2.99 M/UL (ref 4.5–5.9)
RETICS/RBC NFR AUTO: 1.3 % (ref 0.5–1.5)
RH BLOOD TYPE: POSITIVE
SODIUM SERPL-SCNC: 141 MMOL/L (ref 136–144)
TSH SERPL-ACNC: 1.7 UIU/ML (ref 0.45–5.33)
VIT B12 SERPL-MCNC: 1058 PG/ML (ref 181–914)
WBC # BLD AUTO: 5.5 K/UL (ref 4–11)

## 2018-03-02 PROCEDURE — 93005 ELECTROCARDIOGRAM TRACING: CPT

## 2018-03-02 PROCEDURE — 93010 ELECTROCARDIOGRAM REPORT: CPT | Performed by: EMERGENCY MEDICINE

## 2018-03-02 PROCEDURE — 85379 FIBRIN DEGRADATION QUANT: CPT | Performed by: EMERGENCY MEDICINE

## 2018-03-02 PROCEDURE — 85025 COMPLETE CBC W/AUTO DIFF WBC: CPT | Performed by: EMERGENCY MEDICINE

## 2018-03-02 PROCEDURE — 86920 COMPATIBILITY TEST SPIN: CPT

## 2018-03-02 PROCEDURE — 71046 X-RAY EXAM CHEST 2 VIEWS: CPT | Performed by: EMERGENCY MEDICINE

## 2018-03-02 PROCEDURE — 96374 THER/PROPH/DIAG INJ IV PUSH: CPT

## 2018-03-02 PROCEDURE — 86803 HEPATITIS C AB TEST: CPT | Performed by: INTERNAL MEDICINE

## 2018-03-02 PROCEDURE — 84443 ASSAY THYROID STIM HORMONE: CPT | Performed by: INTERNAL MEDICINE

## 2018-03-02 PROCEDURE — 83605 ASSAY OF LACTIC ACID: CPT | Performed by: EMERGENCY MEDICINE

## 2018-03-02 PROCEDURE — 80048 BASIC METABOLIC PNL TOTAL CA: CPT | Performed by: EMERGENCY MEDICINE

## 2018-03-02 PROCEDURE — 86901 BLOOD TYPING SEROLOGIC RH(D): CPT | Performed by: EMERGENCY MEDICINE

## 2018-03-02 PROCEDURE — 86900 BLOOD TYPING SEROLOGIC ABO: CPT | Performed by: EMERGENCY MEDICINE

## 2018-03-02 PROCEDURE — 78582 LUNG VENTILAT&PERFUS IMAGING: CPT | Performed by: EMERGENCY MEDICINE

## 2018-03-02 PROCEDURE — 86850 RBC ANTIBODY SCREEN: CPT | Performed by: EMERGENCY MEDICINE

## 2018-03-02 PROCEDURE — 82607 VITAMIN B-12: CPT | Performed by: INTERNAL MEDICINE

## 2018-03-02 PROCEDURE — 83735 ASSAY OF MAGNESIUM: CPT | Performed by: INTERNAL MEDICINE

## 2018-03-02 PROCEDURE — 85045 AUTOMATED RETICULOCYTE COUNT: CPT | Performed by: INTERNAL MEDICINE

## 2018-03-02 PROCEDURE — 85652 RBC SED RATE AUTOMATED: CPT | Performed by: INTERNAL MEDICINE

## 2018-03-02 PROCEDURE — 82728 ASSAY OF FERRITIN: CPT | Performed by: INTERNAL MEDICINE

## 2018-03-02 PROCEDURE — 99285 EMERGENCY DEPT VISIT HI MDM: CPT

## 2018-03-02 PROCEDURE — 71250 CT THORAX DX C-: CPT | Performed by: EMERGENCY MEDICINE

## 2018-03-02 RX ORDER — METHYLPREDNISOLONE SODIUM SUCCINATE 125 MG/2ML
80 INJECTION, POWDER, LYOPHILIZED, FOR SOLUTION INTRAMUSCULAR; INTRAVENOUS ONCE
Status: COMPLETED | OUTPATIENT
Start: 2018-03-02 | End: 2018-03-02

## 2018-03-02 NOTE — ED INITIAL ASSESSMENT (HPI)
Pt here for low hemoglobin. sts \" I am here for a transfusion\" does c/o generalized fatigue and sob. Denies any pain.

## 2018-03-02 NOTE — TELEPHONE ENCOUNTER
Spoke with Blessing Stewart from Infusion center. They are unable to do the same day infusion. Other option is to send patient to the hospital/ER. Informed Dr. Yuliana Aguilar about it and according to him, it is okay to send patient to the ER for BT.  Called ER at ext [phone] an

## 2018-03-02 NOTE — TELEPHONE ENCOUNTER
Please order STAT CBC and type and cross match and infuse 2 units of blood TODAY. I have ordered other labs but they do not need to be done stat.      Please call patient to let him know

## 2018-03-02 NOTE — TELEPHONE ENCOUNTER
To DR. COUCH - called infusion center , spoke with Junior Kaplan- they will fax order that needs to be filled out and signed and faxed back.  Once approved then we should order CBC and Type and Screen - they are unable to transfuse the same day

## 2018-03-02 NOTE — ED NOTES
Pt states that he was sent from his doctor's office d/t a hgb of 7.8. Pt denies dizziness, chest pain, but c/o some weakness and feeling tired for the past few days. Pt denies dark tarry stools, and denies noticing blood in the stool.  pts skin color appear

## 2018-03-02 NOTE — ED NOTES
Pt requesting to go to the bathroom. RN educated pt on risk of falling  While getting up d/t dizziness and low hgb. RN offered urinal. Pt refusing, despite education and insisting on getting up to go to the bathroom, regardless of the risk.  Will continue t

## 2018-03-02 NOTE — CONSULTS
Pulmonary H&P/Consult     NAME: Maureen More: 92/32 - MRN: [id number] - Age: 74year old - :  1945    Date of Admission: 3/2/2018 11:01 AM  Admission Diagnosis: No admission diagnoses are documented for this encounter.     Assessment/Plan: 9/26/2012   • Colon polyp    • Congestive heart failure (CHF) ([facility] [address]    • Diverticulosis    • Fracture of right hip ([facility] [address] 1999   • Hematologic disorder 09/2005    per NextGen:  \"CrCl est. 60 ml/min\"   • High blood pressure    • High cholesterol    • Histo Smokeless tobacco: Never Used    Alcohol use Yes  3.6 oz/week    6 Standard drinks or equivalent per week         Comment: drinks occasionally     Medications:      ALLERGIES: No Known Allergies    REVIEW OF SYSTEMS: 10 systems reviewed, negative except

## 2018-03-02 NOTE — ED NOTES
Pt returned from vq scan. Pt calm, resting. Denies additional needs at this time. Will continue to monitor.

## 2018-03-02 NOTE — TELEPHONE ENCOUNTER
I spoke to Sari on the phone - he is getting weaker, his breathing feels labored, his appetite is poor. His Hb has dropped to 7.8 - he has pink to red pleural drainage but on 10-20 cc every other day.  His absolute lymphocyte count is very low at 0.3 sug

## 2018-03-02 NOTE — ED PROVIDER NOTES
Patient Seen in: [facility] AND [facility] Emergency Department    History   No chief complaint on file.     Stated Complaint: Abnormal Labs     HPI    74year old male with past medical history of anemia of chronic disease, atrial fibrillation on Xarelto, CAD, C \"Ischemic heart disease\";  \"Management:  PTCA (1994)\"   • Kidney disease 07/05/2013   • Kidney disorder    • Lipid screening 05-    per NextGen Order Module   • Neutropenia [facility])    • Occlusion and stenosis of carotid artery 12/2/2013   • Recent 27.05 kg/m²         Physical Exam   Constitutional: He is oriented to person, place, and time. He appears well-developed and well-nourished. He is cooperative. Non-toxic appearance. He does not appear ill. No distress.    HENT:   Head: Normocephalic and at normal limits   VITAMIN B12 - Abnormal; Notable for the following:     Vitamin B12 1,058 (*)     All other components within normal limits   MAGNESIUM - Abnormal; Notable for the following:     Magnesium 1.4 (*)     All other components within normal limit intervals and axes as noted on EKG Report.   Rate: 60  Rhythm: Atrial Fibrillation  Reading: atrial fibrillation with controlled ventricular response           ED Course as of Mar 02 1714  ------------------------------------------------------------       ZAINA The size of the effusion has decreased in size since prior exam. 5.  Coronary artery calcifications. 6.  Stable probable sebaceous cyst in the posterior chest wall of the T2 level however correlate with physical exam findings.  7.  Stable 72 mm diameter pro Impression:  Dyspnea, unspecified type  (primary encounter diagnosis)  Opacity of lung on imaging study  Bilateral pleural effusion  Chronic anemia    Disposition:  [facility]  3/2/2018  5:14 pm    Follow-up:  Liseth Goss MD  [address] [place] [address]

## 2018-03-02 NOTE — ED NOTES
Received call from Dr. Flynn [facility] office, requesting ED to do followed Lab tests.   Explained that we can try and add on labs however, labs might not be able to be completed d/t fasting need and pt may be billed inappropriately for having non-emergent labs don

## 2018-03-05 ENCOUNTER — TELEPHONE (OUTPATIENT)
Dept: INTERNAL MEDICINE CLINIC | Facility: CLINIC | Age: 73
End: 2018-03-05

## 2018-03-05 ENCOUNTER — LAB ENCOUNTER (OUTPATIENT)
Dept: LAB | Facility: HOSPITAL | Age: 73
End: 2018-03-05
Attending: INTERNAL MEDICINE
Payer: MEDICARE

## 2018-03-05 DIAGNOSIS — D64.9 ANEMIA, UNSPECIFIED TYPE: ICD-10-CM

## 2018-03-05 DIAGNOSIS — D64.9 ANEMIA, UNSPECIFIED: Primary | ICD-10-CM

## 2018-03-05 DIAGNOSIS — D64.9 SYMPTOMATIC ANEMIA: Primary | ICD-10-CM

## 2018-03-05 LAB
ANTIBODY SCREEN: NEGATIVE
HCV AB SERPL QL IA: NONREACTIVE
RH BLOOD TYPE: POSITIVE

## 2018-03-05 PROCEDURE — 86850 RBC ANTIBODY SCREEN: CPT | Performed by: INTERNAL MEDICINE

## 2018-03-05 PROCEDURE — 86920 COMPATIBILITY TEST SPIN: CPT

## 2018-03-05 PROCEDURE — 36415 COLL VENOUS BLD VENIPUNCTURE: CPT | Performed by: INTERNAL MEDICINE

## 2018-03-05 PROCEDURE — 86901 BLOOD TYPING SEROLOGIC RH(D): CPT | Performed by: INTERNAL MEDICINE

## 2018-03-05 PROCEDURE — 86900 BLOOD TYPING SEROLOGIC ABO: CPT | Performed by: INTERNAL MEDICINE

## 2018-03-05 RX ORDER — MELATONIN
400 2 TIMES DAILY
Qty: 60 TABLET | Refills: 2 | Status: SHIPPED | OUTPATIENT
Start: 2018-03-05 | End: 2018-12-11

## 2018-03-05 NOTE — TELEPHONE ENCOUNTER
Spoke with [facility] @ [facility], will need new orders for transfusion and Type and Screen. [place] [facility] [phone][place] conformation received. Advised patient to go to the lab today to complete type and screen.  Per [facility]

## 2018-03-06 ENCOUNTER — OFFICE VISIT (OUTPATIENT)
Dept: HEMATOLOGY/ONCOLOGY | Facility: HOSPITAL | Age: 73
End: 2018-03-06
Attending: INTERNAL MEDICINE
Payer: MEDICARE

## 2018-03-06 VITALS
HEART RATE: 56 BPM | TEMPERATURE: 98 F | RESPIRATION RATE: 16 BRPM | DIASTOLIC BLOOD PRESSURE: 82 MMHG | SYSTOLIC BLOOD PRESSURE: 163 MMHG

## 2018-03-06 DIAGNOSIS — D64.9 ANEMIA, UNSPECIFIED: Primary | ICD-10-CM

## 2018-03-06 LAB — BLOOD TYPE BARCODE: 7300

## 2018-03-06 PROCEDURE — 36430 TRANSFUSION BLD/BLD COMPNT: CPT

## 2018-03-06 PROCEDURE — A4216 STERILE WATER/SALINE, 10 ML: HCPCS

## 2018-03-06 RX ORDER — SODIUM CHLORIDE 9 MG/ML
INJECTION, SOLUTION INTRAVENOUS
Status: DISCONTINUED
Start: 2018-03-06 | End: 2018-03-06

## 2018-03-06 RX ORDER — 0.9 % SODIUM CHLORIDE 0.9 %
VIAL (ML) INJECTION
Status: DISCONTINUED
Start: 2018-03-06 | End: 2018-03-06

## 2018-03-06 NOTE — PROGRESS NOTES
Pt presents today for 1 unit of PRBCs for hgb of 8.4, hct of 26.3. Pt is symptomatic, short of breath and light headed at times. Pt is also fatigued. Stated every since his lung surgery he has never fully recovered.       PIV started in right forearm, bl

## 2018-03-07 LAB — BLOOD TYPE BARCODE: 7300

## 2018-03-12 ENCOUNTER — OFFICE VISIT (OUTPATIENT)
Dept: INTERNAL MEDICINE CLINIC | Facility: CLINIC | Age: 73
End: 2018-03-12

## 2018-03-12 ENCOUNTER — LAB ENCOUNTER (OUTPATIENT)
Dept: LAB | Age: 73
End: 2018-03-12
Attending: INTERNAL MEDICINE
Payer: MEDICARE

## 2018-03-12 VITALS
TEMPERATURE: 97 F | OXYGEN SATURATION: 100 % | HEART RATE: 59 BPM | WEIGHT: 215 LBS | SYSTOLIC BLOOD PRESSURE: 150 MMHG | DIASTOLIC BLOOD PRESSURE: 72 MMHG | HEIGHT: 73 IN | BODY MASS INDEX: 28.49 KG/M2

## 2018-03-12 DIAGNOSIS — I31.3 PERICARDIAL EFFUSION: ICD-10-CM

## 2018-03-12 DIAGNOSIS — I25.9 CHRONIC ISCHEMIC HEART DISEASE: ICD-10-CM

## 2018-03-12 DIAGNOSIS — I10 HYPERTENSION, BENIGN: ICD-10-CM

## 2018-03-12 DIAGNOSIS — D63.8 ANEMIA IN OTHER CHRONIC DISEASES CLASSIFIED ELSEWHERE: ICD-10-CM

## 2018-03-12 DIAGNOSIS — J90 PLEURAL EFFUSION: Primary | ICD-10-CM

## 2018-03-12 DIAGNOSIS — I48.20 CHRONIC ATRIAL FIBRILLATION (HCC): ICD-10-CM

## 2018-03-12 DIAGNOSIS — J90 PLEURAL EFFUSION: ICD-10-CM

## 2018-03-12 DIAGNOSIS — I48.0 PAROXYSMAL ATRIAL FIBRILLATION (HCC): ICD-10-CM

## 2018-03-12 LAB
ALBUMIN SERPL BCP-MCNC: 3.1 G/DL (ref 3.5–4.8)
ALBUMIN/GLOB SERPL: 1.1 {RATIO} (ref 1–2)
ALP SERPL-CCNC: 70 U/L (ref 32–100)
ALT SERPL-CCNC: 19 U/L (ref 17–63)
ANION GAP SERPL CALC-SCNC: 8 MMOL/L (ref 0–18)
AST SERPL-CCNC: 19 U/L (ref 15–41)
BASOPHILS # BLD: 0 K/UL (ref 0–0.2)
BASOPHILS NFR BLD: 0 %
BILIRUB SERPL-MCNC: 0.5 MG/DL (ref 0.3–1.2)
BNP SERPL-MCNC: 1592 PG/ML (ref 0–100)
BUN SERPL-MCNC: 44 MG/DL (ref 8–20)
BUN/CREAT SERPL: 28.6 (ref 10–20)
CALCIUM SERPL-MCNC: 9.1 MG/DL (ref 8.5–10.5)
CHLORIDE SERPL-SCNC: 108 MMOL/L (ref 95–110)
CO2 SERPL-SCNC: 25 MMOL/L (ref 22–32)
CREAT SERPL-MCNC: 1.54 MG/DL (ref 0.5–1.5)
EOSINOPHIL # BLD: 0 K/UL (ref 0–0.7)
EOSINOPHIL NFR BLD: 0 %
ERYTHROCYTE [DISTWIDTH] IN BLOOD BY AUTOMATED COUNT: 17.1 % (ref 11–15)
FERRITIN SERPL IA-MCNC: 60 NG/ML (ref 24–336)
GLOBULIN PLAS-MCNC: 2.9 G/DL (ref 2.5–3.7)
GLUCOSE SERPL-MCNC: 107 MG/DL (ref 70–99)
HCT VFR BLD AUTO: 29.7 % (ref 41–52)
HGB BLD-MCNC: 9.5 G/DL (ref 13.5–17.5)
LYMPHOCYTES # BLD: 0.2 K/UL (ref 1–4)
LYMPHOCYTES NFR BLD: 2 %
MAGNESIUM SERPL-MCNC: 1.8 MG/DL (ref 1.8–2.5)
MCH RBC QN AUTO: 28.5 PG (ref 27–32)
MCHC RBC AUTO-ENTMCNC: 32.1 G/DL (ref 32–37)
MCV RBC AUTO: 88.9 FL (ref 80–100)
METAMYELOCYTES # BLD MANUAL: 0.2 K/UL
METAMYELOCYTES NFR BLD: 2 %
MONOCYTES # BLD: 0.7 K/UL (ref 0–1)
MONOCYTES NFR BLD: 7 %
NEUTROPHILS # BLD AUTO: 9 K/UL (ref 1.8–7.7)
NEUTROPHILS NFR BLD: 88 %
NEUTS BAND NFR BLD: 1 %
OSMOLALITY UR CALC.SUM OF ELEC: 304 MOSM/KG (ref 275–295)
PATIENT FASTING: NO
PLATELET # BLD AUTO: 266 K/UL (ref 140–400)
PMV BLD AUTO: 8.4 FL (ref 7.4–10.3)
POTASSIUM SERPL-SCNC: 4.6 MMOL/L (ref 3.3–5.1)
PROT SERPL-MCNC: 6 G/DL (ref 5.9–8.4)
RBC # BLD AUTO: 3.34 M/UL (ref 4.5–5.9)
SODIUM SERPL-SCNC: 141 MMOL/L (ref 136–144)
TSH SERPL-ACNC: 2.24 UIU/ML (ref 0.45–5.33)
WBC # BLD AUTO: 10.1 K/UL (ref 4–11)

## 2018-03-12 PROCEDURE — 36415 COLL VENOUS BLD VENIPUNCTURE: CPT

## 2018-03-12 PROCEDURE — 85027 COMPLETE CBC AUTOMATED: CPT

## 2018-03-12 PROCEDURE — 84443 ASSAY THYROID STIM HORMONE: CPT

## 2018-03-12 PROCEDURE — G0463 HOSPITAL OUTPT CLINIC VISIT: HCPCS | Performed by: INTERNAL MEDICINE

## 2018-03-12 PROCEDURE — 82728 ASSAY OF FERRITIN: CPT

## 2018-03-12 PROCEDURE — 83735 ASSAY OF MAGNESIUM: CPT

## 2018-03-12 PROCEDURE — 85025 COMPLETE CBC W/AUTO DIFF WBC: CPT

## 2018-03-12 PROCEDURE — 99214 OFFICE O/P EST MOD 30 MIN: CPT | Performed by: INTERNAL MEDICINE

## 2018-03-12 PROCEDURE — 83880 ASSAY OF NATRIURETIC PEPTIDE: CPT

## 2018-03-12 PROCEDURE — 85007 BL SMEAR W/DIFF WBC COUNT: CPT

## 2018-03-12 PROCEDURE — 80053 COMPREHEN METABOLIC PANEL: CPT

## 2018-03-12 RX ORDER — PREDNISONE 10 MG/1
TABLET ORAL
Qty: 100 TABLET | Refills: 1 | Status: SHIPPED | OUTPATIENT
Start: 2018-03-12 | End: 2018-04-16

## 2018-03-12 NOTE — PROGRESS NOTES
Jaskaran Cabrera is a 74year old male. HPI:   He is about 40 % improved compared to his worst after pleurodesis and empiric steroids and 1 u packed RBC. His main issue is dyspnea mostly with exertion but occas when he is sitting in the chair.  No chest pain MG Oral Cap Take 1 capsule (100 mg total) by mouth 3 (three) times daily. Disp: 90 capsule Rfl: 11   finasteride 5 MG Oral Tab Take 1 tablet (5 mg total) by mouth daily.  Disp: 90 tablet Rfl: 3   hydrALAzine HCl 25 MG Oral Tab TAKE TWO TABLETS BY MOUTH THRE ml/min\"   • High blood pressure    • High cholesterol    • History of blood transfusion 03/2016   • History of thoracentesis 08/07/2017       • Hyposmolality and/or hyponatremia 8/14/2012   • Iron deficiency anemia 7/16/2013   • Ischemic heart disease cyanosis, clubbing; 3-4+ PT edema > left    ASSESSMENT AND PLAN:   1. Pleural effusion  I cannot appreciate right pleural effusion on exam but moderate on CT from 3/2/18.  He is not in clinical HF but needs exclusion     - BNP (B TYPE NATRIUERTIC PEPTIDE);

## 2018-03-13 ENCOUNTER — TELEPHONE (OUTPATIENT)
Dept: INTERNAL MEDICINE CLINIC | Facility: CLINIC | Age: 73
End: 2018-03-13

## 2018-03-13 NOTE — TELEPHONE ENCOUNTER
Spoke with Cullen at [facility]. He states patient's Anucort-HC suppositories are not covered by patient's insurance, however Procto-Med 2.5% cream would be covered. To Dr. Antwon Boone to advise on med change and quantity. Med pended below. Direct pharmacy line is [phone].

## 2018-03-16 ENCOUNTER — PRIOR ORIGINAL RECORDS (OUTPATIENT)
Dept: OTHER | Age: 73
End: 2018-03-16

## 2018-03-26 ENCOUNTER — HOSPITAL ENCOUNTER (OUTPATIENT)
Dept: CT IMAGING | Facility: HOSPITAL | Age: 73
Discharge: HOME | End: 2018-03-26
Attending: INTERNAL MEDICINE
Payer: MEDICARE

## 2018-03-26 DIAGNOSIS — R91.1 PULMONARY NODULE: ICD-10-CM

## 2018-03-26 DIAGNOSIS — J90 PLEURAL EFFUSION: ICD-10-CM

## 2018-03-26 PROCEDURE — 71250 CT THORAX DX C-: CPT | Performed by: INTERNAL MEDICINE

## 2018-03-31 ENCOUNTER — APPOINTMENT (OUTPATIENT)
Dept: LAB | Facility: HOSPITAL | Age: 73
End: 2018-03-31
Attending: INTERNAL MEDICINE
Payer: MEDICARE

## 2018-03-31 DIAGNOSIS — N18.3 ACUTE RENAL FAILURE SUPERIMPOSED ON STAGE 3 CHRONIC KIDNEY DISEASE, UNSPECIFIED ACUTE RENAL FAILURE TYPE: ICD-10-CM

## 2018-03-31 DIAGNOSIS — N17.9 ACUTE RENAL FAILURE SUPERIMPOSED ON STAGE 3 CHRONIC KIDNEY DISEASE, UNSPECIFIED ACUTE RENAL FAILURE TYPE: ICD-10-CM

## 2018-03-31 PROCEDURE — 80069 RENAL FUNCTION PANEL: CPT

## 2018-03-31 PROCEDURE — 36415 COLL VENOUS BLD VENIPUNCTURE: CPT

## 2018-04-11 ENCOUNTER — TELEPHONE (OUTPATIENT)
Dept: INTERNAL MEDICINE CLINIC | Facility: CLINIC | Age: 73
End: 2018-04-11

## 2018-04-11 DIAGNOSIS — E78.00 HYPERCHOLESTEREMIA: Primary | ICD-10-CM

## 2018-04-13 ENCOUNTER — LAB ENCOUNTER (OUTPATIENT)
Dept: LAB | Facility: HOSPITAL | Age: 73
End: 2018-04-13
Attending: INTERNAL MEDICINE
Payer: MEDICARE

## 2018-04-13 DIAGNOSIS — E78.00 HYPERCHOLESTEREMIA: ICD-10-CM

## 2018-04-13 PROCEDURE — 36415 COLL VENOUS BLD VENIPUNCTURE: CPT

## 2018-04-13 PROCEDURE — 80053 COMPREHEN METABOLIC PANEL: CPT

## 2018-04-13 PROCEDURE — 80061 LIPID PANEL: CPT

## 2018-04-13 PROCEDURE — 85652 RBC SED RATE AUTOMATED: CPT

## 2018-04-13 PROCEDURE — 85025 COMPLETE CBC W/AUTO DIFF WBC: CPT

## 2018-04-13 PROCEDURE — 84443 ASSAY THYROID STIM HORMONE: CPT

## 2018-04-16 ENCOUNTER — OFFICE VISIT (OUTPATIENT)
Dept: INTERNAL MEDICINE CLINIC | Facility: CLINIC | Age: 73
End: 2018-04-16

## 2018-04-16 VITALS
DIASTOLIC BLOOD PRESSURE: 70 MMHG | BODY MASS INDEX: 27 KG/M2 | WEIGHT: 208 LBS | SYSTOLIC BLOOD PRESSURE: 120 MMHG | HEART RATE: 60 BPM | TEMPERATURE: 98 F

## 2018-04-16 DIAGNOSIS — N28.9 RENAL INSUFFICIENCY: ICD-10-CM

## 2018-04-16 DIAGNOSIS — D50.8 OTHER IRON DEFICIENCY ANEMIA: ICD-10-CM

## 2018-04-16 DIAGNOSIS — I10 HYPERTENSION, BENIGN: ICD-10-CM

## 2018-04-16 DIAGNOSIS — J90 PLEURAL EFFUSION: Primary | ICD-10-CM

## 2018-04-16 PROCEDURE — G0463 HOSPITAL OUTPT CLINIC VISIT: HCPCS | Performed by: INTERNAL MEDICINE

## 2018-04-16 PROCEDURE — 99214 OFFICE O/P EST MOD 30 MIN: CPT | Performed by: INTERNAL MEDICINE

## 2018-04-16 NOTE — PROGRESS NOTES
Odalis Anderson is a 74year old male. HPI:   He is stable and feeling well generally, appetite good, energy much better. 1. Pleural effusion  This is stable.  He saw Dr Tatiana Blackman and was given Lasix 40 mg daily and initially had good improvement in his mouth 2 (two) times daily.  Disp:  Rfl:    indapamide 1.25 MG Oral Tab Take one every other day for BP and fluid retention Disp: 45 tablet Rfl: 3   ISOSORBIDE MONONITRATE ER 30 MG Oral Tablet 24 Hr TAKE ONE TABLET BY MOUTH EVERY DAY Disp: 90 tablet Rfl: 3 1993   • UTI (urinary tract infection) 08/2017      Social History:  Smoking status: Former Smoker                                                              Packs/day: 1.00      Years: 45.00        Types: Cigarettes     Start date: 10/3/1958     Quit da

## 2018-04-17 ENCOUNTER — TELEPHONE (OUTPATIENT)
Dept: INTERNAL MEDICINE CLINIC | Facility: CLINIC | Age: 73
End: 2018-04-17

## 2018-04-17 RX ORDER — PREDNISONE 1 MG/1
TABLET ORAL
Qty: 100 TABLET | Refills: 1 | Status: SHIPPED | OUTPATIENT
Start: 2018-04-17 | End: 2018-12-11

## 2018-04-17 NOTE — TELEPHONE ENCOUNTER
Received VO from Dr. Maggie Jackson for Prednisone 5 mg BID x 3 days, then 1 tablet q day # 100/1. Orders entered.  Pt notified by Dr. Cb Machuca.

## 2018-04-20 RX ORDER — SIMVASTATIN 40 MG
40 TABLET ORAL DAILY
Qty: 90 TABLET | Refills: 3 | Status: SHIPPED | OUTPATIENT
Start: 2018-04-20 | End: 2019-04-01

## 2018-04-20 RX ORDER — ISOSORBIDE MONONITRATE 30 MG/1
TABLET, EXTENDED RELEASE ORAL
Qty: 90 TABLET | Refills: 3 | Status: SHIPPED | OUTPATIENT
Start: 2018-04-20 | End: 2019-04-01

## 2018-04-22 NOTE — TELEPHONE ENCOUNTER
Edith Dillon please review request as labs have s=changes and Blanca did increase Lasix for a couple days

## 2018-04-23 RX ORDER — INDAPAMIDE 1.25 MG/1
TABLET, FILM COATED ORAL
Qty: 45 TABLET | Refills: 3 | Status: SHIPPED | OUTPATIENT
Start: 2018-04-23 | End: 2019-04-15

## 2018-05-14 ENCOUNTER — LAB ENCOUNTER (OUTPATIENT)
Dept: LAB | Facility: HOSPITAL | Age: 73
End: 2018-05-14
Attending: INTERNAL MEDICINE
Payer: MEDICARE

## 2018-05-14 DIAGNOSIS — D50.8 OTHER IRON DEFICIENCY ANEMIA: ICD-10-CM

## 2018-05-14 DIAGNOSIS — J90 PLEURAL EFFUSION: ICD-10-CM

## 2018-05-14 PROCEDURE — 85025 COMPLETE CBC W/AUTO DIFF WBC: CPT

## 2018-05-14 PROCEDURE — 82728 ASSAY OF FERRITIN: CPT

## 2018-05-14 PROCEDURE — 36415 COLL VENOUS BLD VENIPUNCTURE: CPT

## 2018-05-14 PROCEDURE — 80053 COMPREHEN METABOLIC PANEL: CPT

## 2018-05-16 ENCOUNTER — OFFICE VISIT (OUTPATIENT)
Dept: INTERNAL MEDICINE CLINIC | Facility: CLINIC | Age: 73
End: 2018-05-16

## 2018-05-16 VITALS
BODY MASS INDEX: 27.05 KG/M2 | HEART RATE: 61 BPM | TEMPERATURE: 98 F | HEIGHT: 74 IN | WEIGHT: 210.81 LBS | DIASTOLIC BLOOD PRESSURE: 72 MMHG | SYSTOLIC BLOOD PRESSURE: 120 MMHG | OXYGEN SATURATION: 98 %

## 2018-05-16 DIAGNOSIS — D50.9 IRON DEFICIENCY ANEMIA, UNSPECIFIED IRON DEFICIENCY ANEMIA TYPE: ICD-10-CM

## 2018-05-16 DIAGNOSIS — J90 PLEURAL EFFUSION: ICD-10-CM

## 2018-05-16 DIAGNOSIS — N28.9 RENAL INSUFFICIENCY: Primary | ICD-10-CM

## 2018-05-16 DIAGNOSIS — I10 HYPERTENSION, BENIGN: ICD-10-CM

## 2018-05-16 PROCEDURE — G0463 HOSPITAL OUTPT CLINIC VISIT: HCPCS | Performed by: INTERNAL MEDICINE

## 2018-05-16 PROCEDURE — 99214 OFFICE O/P EST MOD 30 MIN: CPT | Performed by: INTERNAL MEDICINE

## 2018-05-17 NOTE — PROGRESS NOTES
Morena Hamilton is a 74year old male. HPI:   He looks and feels good today. States he only feels well about 50% of the time.  He gets dyspnea on mild exertion unpredictably about 2-3 x per week - lasts several minutes and goes away when he sits down and re 1   hydrocortisone (PROCTO-MED HC) 2.5 % Rectal Cream Place 1 Application rectally 2 (two) times daily. Disp: 1 Tube Rfl: 1   Magnesium Oxide 400 (240 Mg) MG Oral Tab Take 1 tablet (400 mg total) by mouth 2 (two) times daily.  Disp: 60 tablet Rfl: 2   PANTO 09/2005    per NextGen:  \"CrCl est. 60 ml/min\"   • High blood pressure    • High cholesterol    • History of blood transfusion 03/2016   • History of thoracentesis 08/07/2017       • Hyposmolality and/or hyponatremia 8/14/2012   • Iron deficiency anemi Renal insufficiency    - BASIC METABOLIC PANEL (8); Future  - CBC WITH DIFFERENTIAL WITH PLATELET; Future    2. Pleural effusion  Dr Tan Teixeira has elected to remove Lt pleural drain     3.  Iron deficiency anemia, unspecified iron deficiency anemia type  H

## 2018-05-18 ENCOUNTER — APPOINTMENT (OUTPATIENT)
Dept: GENERAL RADIOLOGY | Facility: HOSPITAL | Age: 73
End: 2018-05-18
Attending: RADIOLOGY
Payer: MEDICARE

## 2018-05-18 ENCOUNTER — HOSPITAL ENCOUNTER (OUTPATIENT)
Dept: INTERVENTIONAL RADIOLOGY/VASCULAR | Facility: HOSPITAL | Age: 73
Discharge: HOME | End: 2018-05-18
Attending: INTERNAL MEDICINE | Admitting: INTERNAL MEDICINE
Payer: MEDICARE

## 2018-05-18 VITALS
OXYGEN SATURATION: 99 % | RESPIRATION RATE: 19 BRPM | WEIGHT: 205 LBS | HEART RATE: 58 BPM | BODY MASS INDEX: 26 KG/M2 | DIASTOLIC BLOOD PRESSURE: 89 MMHG | SYSTOLIC BLOOD PRESSURE: 159 MMHG

## 2018-05-18 DIAGNOSIS — J90 PLEURAL EFFUSION: ICD-10-CM

## 2018-05-18 PROCEDURE — 32552 REMOVE LUNG CATHETER: CPT

## 2018-05-18 PROCEDURE — 71045 X-RAY EXAM CHEST 1 VIEW: CPT | Performed by: RADIOLOGY

## 2018-05-18 PROCEDURE — 0WPBX0Z REMOVAL OF DRAINAGE DEVICE FROM LEFT PLEURAL CAVITY, EXTERNAL APPROACH: ICD-10-PCS | Performed by: RADIOLOGY

## 2018-05-18 PROCEDURE — 99152 MOD SED SAME PHYS/QHP 5/>YRS: CPT

## 2018-05-18 RX ORDER — MIDAZOLAM HYDROCHLORIDE 1 MG/ML
INJECTION INTRAMUSCULAR; INTRAVENOUS
Status: COMPLETED
Start: 2018-05-18 | End: 2018-05-18

## 2018-05-18 RX ORDER — LIDOCAINE HYDROCHLORIDE 20 MG/ML
INJECTION, SOLUTION EPIDURAL; INFILTRATION; INTRACAUDAL; PERINEURAL
Status: COMPLETED
Start: 2018-05-18 | End: 2018-05-18

## 2018-05-18 RX ORDER — SODIUM CHLORIDE 9 MG/ML
INJECTION, SOLUTION INTRAVENOUS ONCE
Status: DISCONTINUED | OUTPATIENT
Start: 2018-05-18 | End: 2018-05-18

## 2018-05-18 RX ORDER — SODIUM CHLORIDE 9 MG/ML
INJECTION, SOLUTION INTRAVENOUS
Status: DISCONTINUED
Start: 2018-05-18 | End: 2018-05-18

## 2018-05-18 NOTE — PROGRESS NOTES
Patient here for removal of a left sided chest pleurex catheter removal. Universal protocol timeout was completed. 1 of versad and 50 of fentayl given. Patient tolerated procedure. Vital signs stable. Report given to [facility].         Elvira Seip

## 2018-05-22 NOTE — PROCEDURES
[facility]  Procedure Note    Malianiraj Rivera Patient Status:  Outpatient in a Bed    1945 MRN [id number]   Location [facility] Attending No att. providers found   Hosp Day # 0 PCP [facility] Keyes.  ZAINA Ashby

## 2018-05-30 ENCOUNTER — LAB ENCOUNTER (OUTPATIENT)
Dept: LAB | Facility: HOSPITAL | Age: 73
End: 2018-05-30
Attending: INTERNAL MEDICINE
Payer: MEDICARE

## 2018-05-30 DIAGNOSIS — N28.9 RENAL INSUFFICIENCY: ICD-10-CM

## 2018-05-30 DIAGNOSIS — R60.9 EDEMA, UNSPECIFIED TYPE: ICD-10-CM

## 2018-05-30 DIAGNOSIS — N17.9 ACUTE RENAL FAILURE, UNSPECIFIED ACUTE RENAL FAILURE TYPE (HCC): ICD-10-CM

## 2018-05-30 DIAGNOSIS — D64.9 ANEMIA, UNSPECIFIED TYPE: ICD-10-CM

## 2018-05-30 PROCEDURE — 85025 COMPLETE CBC W/AUTO DIFF WBC: CPT

## 2018-05-30 PROCEDURE — 36415 COLL VENOUS BLD VENIPUNCTURE: CPT

## 2018-05-30 PROCEDURE — 80069 RENAL FUNCTION PANEL: CPT

## 2018-06-03 ENCOUNTER — TELEPHONE (OUTPATIENT)
Dept: INTERNAL MEDICINE CLINIC | Facility: CLINIC | Age: 73
End: 2018-06-03

## 2018-06-03 NOTE — TELEPHONE ENCOUNTER
Anemia a little better, kidney function a little worse - to be expected from diuretics - continue same, How is he doing?     Get labs ordered by Dr Yoly Muñoz before he sees me

## 2018-06-04 NOTE — TELEPHONE ENCOUNTER
FYI Dr. Sudheer Singh MD message to pt, pt states he has already had labs drawn for Dr. Etelvina Shay. Pt states he is feeling a lot better.

## 2018-06-20 ENCOUNTER — OFFICE VISIT (OUTPATIENT)
Dept: INTERNAL MEDICINE CLINIC | Facility: CLINIC | Age: 73
End: 2018-06-20

## 2018-06-20 VITALS
WEIGHT: 201 LBS | TEMPERATURE: 99 F | BODY MASS INDEX: 25.8 KG/M2 | HEART RATE: 64 BPM | OXYGEN SATURATION: 97 % | SYSTOLIC BLOOD PRESSURE: 154 MMHG | RESPIRATION RATE: 16 BRPM | DIASTOLIC BLOOD PRESSURE: 72 MMHG | HEIGHT: 74 IN

## 2018-06-20 DIAGNOSIS — R06.00 DYSPNEA, UNSPECIFIED TYPE: Primary | ICD-10-CM

## 2018-06-20 DIAGNOSIS — I48.20 CHRONIC ATRIAL FIBRILLATION (HCC): ICD-10-CM

## 2018-06-20 DIAGNOSIS — I10 HYPERTENSION, BENIGN: ICD-10-CM

## 2018-06-20 DIAGNOSIS — N28.9 RENAL INSUFFICIENCY: ICD-10-CM

## 2018-06-20 PROCEDURE — G0463 HOSPITAL OUTPT CLINIC VISIT: HCPCS | Performed by: INTERNAL MEDICINE

## 2018-06-20 PROCEDURE — 99214 OFFICE O/P EST MOD 30 MIN: CPT | Performed by: INTERNAL MEDICINE

## 2018-06-20 NOTE — PROGRESS NOTES
J Carlos Aguilar is a 74year old male. HPI:   1. Dyspnea, unspecified type  This is considerably improved on steroids and more frequent diuretic use - 2-4 x per week. No chest pain, he had one episode of PND about 2 weeks ago.  He has no dyspnea with ordina 0   finasteride 5 MG Oral Tab Take 1 tablet (5 mg total) by mouth daily.  Disp: 90 tablet Rfl: 3   hydrALAzine HCl 25 MG Oral Tab TAKE TWO TABLETS BY MOUTH THREE TIMES A DAY Disp: 540 tablet Rfl: 3   rivaroxaban (XARELTO) 15 MG Oral Tab Take 15 mg by mouth 05-    per NextGen Order Module   • Neutropenia [facility])    • Occlusion and stenosis of carotid artery 12/2/2013   • Recent change in frequency of bowel movements    • Stroke [facility]) 1993   • UTI (urinary tract infection) 08/2017      Social History:  Maximino

## 2018-07-09 ENCOUNTER — LAB ENCOUNTER (OUTPATIENT)
Dept: LAB | Facility: HOSPITAL | Age: 73
End: 2018-07-09
Attending: INTERNAL MEDICINE
Payer: MEDICARE

## 2018-07-09 ENCOUNTER — HOSPITAL ENCOUNTER (OUTPATIENT)
Dept: GENERAL RADIOLOGY | Facility: HOSPITAL | Age: 73
Discharge: HOME | End: 2018-07-09
Attending: INTERNAL MEDICINE
Payer: MEDICARE

## 2018-07-09 DIAGNOSIS — N28.9 RENAL INSUFFICIENCY: ICD-10-CM

## 2018-07-09 DIAGNOSIS — J90 PLEURAL EFFUSION: ICD-10-CM

## 2018-07-09 LAB
ANION GAP SERPL CALC-SCNC: 11 MMOL/L (ref 0–18)
BASOPHILS # BLD: 0 K/UL (ref 0–0.2)
BASOPHILS NFR BLD: 0 %
BUN SERPL-MCNC: 65 MG/DL (ref 8–20)
BUN/CREAT SERPL: 38.7 (ref 10–20)
CALCIUM SERPL-MCNC: 9.1 MG/DL (ref 8.5–10.5)
CHLORIDE SERPL-SCNC: 100 MMOL/L (ref 95–110)
CO2 SERPL-SCNC: 26 MMOL/L (ref 22–32)
CREAT SERPL-MCNC: 1.68 MG/DL (ref 0.5–1.5)
EOSINOPHIL # BLD: 0 K/UL (ref 0–0.7)
EOSINOPHIL NFR BLD: 1 %
ERYTHROCYTE [DISTWIDTH] IN BLOOD BY AUTOMATED COUNT: 18 % (ref 11–15)
GLUCOSE SERPL-MCNC: 108 MG/DL (ref 70–99)
HCT VFR BLD AUTO: 28.4 % (ref 41–52)
HGB BLD-MCNC: 9.4 G/DL (ref 13.5–17.5)
LYMPHOCYTES # BLD: 0.4 K/UL (ref 1–4)
LYMPHOCYTES NFR BLD: 6 %
MCH RBC QN AUTO: 29.3 PG (ref 27–32)
MCHC RBC AUTO-ENTMCNC: 33 G/DL (ref 32–37)
MCV RBC AUTO: 88.8 FL (ref 80–100)
MONOCYTES # BLD: 0.5 K/UL (ref 0–1)
MONOCYTES NFR BLD: 8 %
NEUTROPHILS # BLD AUTO: 5.6 K/UL (ref 1.8–7.7)
NEUTROPHILS NFR BLD: 85 %
OSMOLALITY UR CALC.SUM OF ELEC: 303 MOSM/KG (ref 275–295)
PLATELET # BLD AUTO: 236 K/UL (ref 140–400)
PMV BLD AUTO: 8.3 FL (ref 7.4–10.3)
POTASSIUM SERPL-SCNC: 4 MMOL/L (ref 3.3–5.1)
RBC # BLD AUTO: 3.2 M/UL (ref 4.5–5.9)
SODIUM SERPL-SCNC: 137 MMOL/L (ref 136–144)
WBC # BLD AUTO: 6.5 K/UL (ref 4–11)

## 2018-07-09 PROCEDURE — 80048 BASIC METABOLIC PNL TOTAL CA: CPT

## 2018-07-09 PROCEDURE — 85025 COMPLETE CBC W/AUTO DIFF WBC: CPT

## 2018-07-09 PROCEDURE — 36415 COLL VENOUS BLD VENIPUNCTURE: CPT

## 2018-07-09 PROCEDURE — 71046 X-RAY EXAM CHEST 2 VIEWS: CPT | Performed by: INTERNAL MEDICINE

## 2018-07-09 NOTE — PROGRESS NOTES
Minimal b/l effusion, which is good.  No change in plan from LOV, will review in greater detail at next month's visit

## 2018-07-13 ENCOUNTER — TELEPHONE (OUTPATIENT)
Dept: INTERNAL MEDICINE CLINIC | Facility: CLINIC | Age: 73
End: 2018-07-13

## 2018-07-25 RX ORDER — TAMSULOSIN HYDROCHLORIDE 0.4 MG/1
CAPSULE ORAL
Qty: 30 CAPSULE | Refills: 11 | Status: SHIPPED | OUTPATIENT
Start: 2018-07-25 | End: 2019-08-08

## 2018-07-27 ENCOUNTER — PRIOR ORIGINAL RECORDS (OUTPATIENT)
Dept: OTHER | Age: 73
End: 2018-07-27

## 2018-08-01 ENCOUNTER — OFFICE VISIT (OUTPATIENT)
Dept: INTERNAL MEDICINE CLINIC | Facility: CLINIC | Age: 73
End: 2018-08-01
Payer: MEDICARE

## 2018-08-01 VITALS
SYSTOLIC BLOOD PRESSURE: 108 MMHG | HEIGHT: 74 IN | HEART RATE: 57 BPM | BODY MASS INDEX: 26.31 KG/M2 | DIASTOLIC BLOOD PRESSURE: 62 MMHG | WEIGHT: 205 LBS | TEMPERATURE: 98 F | OXYGEN SATURATION: 98 %

## 2018-08-01 DIAGNOSIS — I48.20 CHRONIC ATRIAL FIBRILLATION (HCC): Primary | ICD-10-CM

## 2018-08-01 DIAGNOSIS — D63.8 ANEMIA IN OTHER CHRONIC DISEASES CLASSIFIED ELSEWHERE: ICD-10-CM

## 2018-08-01 DIAGNOSIS — D50.9 IRON DEFICIENCY ANEMIA, UNSPECIFIED IRON DEFICIENCY ANEMIA TYPE: ICD-10-CM

## 2018-08-01 DIAGNOSIS — I10 HYPERTENSION, BENIGN: ICD-10-CM

## 2018-08-01 DIAGNOSIS — N28.9 RENAL INSUFFICIENCY: ICD-10-CM

## 2018-08-01 PROCEDURE — 99214 OFFICE O/P EST MOD 30 MIN: CPT | Performed by: INTERNAL MEDICINE

## 2018-08-01 PROCEDURE — G0463 HOSPITAL OUTPT CLINIC VISIT: HCPCS | Performed by: INTERNAL MEDICINE

## 2018-08-01 NOTE — PROGRESS NOTES
Odalis Anderson is a 74year old male. HPI:   He is doing well and stable and breathing is improving slowly - he did not do well on prednisone 2.5 and he went back on 5 mg. He is eating well, karolyn stable.  Energy improving      SR: no chest pain or sob Tab TAKE TWO TABLETS BY MOUTH THREE TIMES A DAY Disp: 540 tablet Rfl: 3   rivaroxaban (XARELTO) 15 MG Oral Tab Take 15 mg by mouth daily with food. Disp:  Rfl:    Labetalol HCl 200 MG Oral Tab Take 200 mg by mouth 2 (two) times daily.  Disp:  Rfl:    Vitami Years: 45.00        Types: Cigarettes     Start date: 10/3/1958     Quit date: 1/1/1993  Smokeless tobacco: Never Used                      Alcohol use: Yes           3.6 oz/week     Standard drinks or equivalent: 6 per week     Comment: drinks occasionall

## 2018-08-27 RX ORDER — HYDRALAZINE HYDROCHLORIDE 25 MG/1
TABLET, FILM COATED ORAL
Qty: 540 TABLET | Refills: 3 | Status: SHIPPED | OUTPATIENT
Start: 2018-08-27 | End: 2019-06-17

## 2018-08-27 RX ORDER — HYDRALAZINE HYDROCHLORIDE 25 MG/1
TABLET, FILM COATED ORAL
Qty: 540 TABLET | Refills: 3 | OUTPATIENT
Start: 2018-08-27

## 2018-08-30 ENCOUNTER — LAB ENCOUNTER (OUTPATIENT)
Dept: LAB | Facility: HOSPITAL | Age: 73
End: 2018-08-30
Attending: INTERNAL MEDICINE
Payer: MEDICARE

## 2018-08-30 ENCOUNTER — TELEPHONE (OUTPATIENT)
Dept: INTERNAL MEDICINE CLINIC | Facility: CLINIC | Age: 73
End: 2018-08-30

## 2018-08-30 DIAGNOSIS — D63.8 ANEMIA IN OTHER CHRONIC DISEASES CLASSIFIED ELSEWHERE: ICD-10-CM

## 2018-08-30 DIAGNOSIS — N17.9 ACUTE RENAL FAILURE SUPERIMPOSED ON STAGE 3 CHRONIC KIDNEY DISEASE, UNSPECIFIED ACUTE RENAL FAILURE TYPE: ICD-10-CM

## 2018-08-30 DIAGNOSIS — N18.3 ACUTE RENAL FAILURE SUPERIMPOSED ON STAGE 3 CHRONIC KIDNEY DISEASE, UNSPECIFIED ACUTE RENAL FAILURE TYPE: ICD-10-CM

## 2018-08-30 LAB
ALBUMIN SERPL BCP-MCNC: 3.3 G/DL (ref 3.5–4.8)
ANION GAP SERPL CALC-SCNC: 13 MMOL/L (ref 0–18)
BASOPHILS # BLD: 0 K/UL (ref 0–0.2)
BASOPHILS NFR BLD: 0 %
BUN SERPL-MCNC: 74 MG/DL (ref 8–20)
BUN/CREAT SERPL: 40 (ref 10–20)
CALCIUM SERPL-MCNC: 9.3 MG/DL (ref 8.5–10.5)
CHLORIDE SERPL-SCNC: 102 MMOL/L (ref 95–110)
CO2 SERPL-SCNC: 25 MMOL/L (ref 22–32)
CREAT SERPL-MCNC: 1.85 MG/DL (ref 0.5–1.5)
EOSINOPHIL # BLD: 0 K/UL (ref 0–0.7)
EOSINOPHIL NFR BLD: 1 %
ERYTHROCYTE [DISTWIDTH] IN BLOOD BY AUTOMATED COUNT: 18.5 % (ref 11–15)
FERRITIN SERPL IA-MCNC: 75 NG/ML (ref 24–336)
GLUCOSE SERPL-MCNC: 103 MG/DL (ref 70–99)
HCT VFR BLD AUTO: 28.8 % (ref 41–52)
HGB BLD-MCNC: 9.2 G/DL (ref 13.5–17.5)
LYMPHOCYTES # BLD: 0.3 K/UL (ref 1–4)
LYMPHOCYTES NFR BLD: 4 %
MCH RBC QN AUTO: 27.9 PG (ref 27–32)
MCHC RBC AUTO-ENTMCNC: 31.8 G/DL (ref 32–37)
MCV RBC AUTO: 87.7 FL (ref 80–100)
MONOCYTES # BLD: 0.4 K/UL (ref 0–1)
MONOCYTES NFR BLD: 6 %
NEUTROPHILS # BLD AUTO: 5.5 K/UL (ref 1.8–7.7)
NEUTROPHILS NFR BLD: 88 %
OSMOLALITY UR CALC.SUM OF ELEC: 312 MOSM/KG (ref 275–295)
PHOSPHATE SERPL-MCNC: 4.1 MG/DL (ref 2.4–4.7)
PLATELET # BLD AUTO: 269 K/UL (ref 140–400)
PMV BLD AUTO: 8 FL (ref 7.4–10.3)
POTASSIUM SERPL-SCNC: 4 MMOL/L (ref 3.3–5.1)
RBC # BLD AUTO: 3.29 M/UL (ref 4.5–5.9)
SODIUM SERPL-SCNC: 140 MMOL/L (ref 136–144)
WBC # BLD AUTO: 6.2 K/UL (ref 4–11)

## 2018-08-30 PROCEDURE — 80069 RENAL FUNCTION PANEL: CPT

## 2018-08-30 PROCEDURE — 82728 ASSAY OF FERRITIN: CPT

## 2018-08-30 PROCEDURE — 36415 COLL VENOUS BLD VENIPUNCTURE: CPT

## 2018-08-30 PROCEDURE — 83970 ASSAY OF PARATHORMONE: CPT

## 2018-08-30 PROCEDURE — 85025 COMPLETE CBC W/AUTO DIFF WBC: CPT

## 2018-08-31 LAB — PTH-INTACT SERPL-MCNC: 36.2 PG/ML (ref 12–88)

## 2018-09-18 ENCOUNTER — OFFICE VISIT (OUTPATIENT)
Dept: INTERNAL MEDICINE CLINIC | Facility: CLINIC | Age: 73
End: 2018-09-18
Payer: MEDICARE

## 2018-09-18 VITALS
WEIGHT: 203 LBS | HEIGHT: 74 IN | SYSTOLIC BLOOD PRESSURE: 118 MMHG | BODY MASS INDEX: 26.05 KG/M2 | OXYGEN SATURATION: 98 % | HEART RATE: 66 BPM | DIASTOLIC BLOOD PRESSURE: 68 MMHG | TEMPERATURE: 98 F

## 2018-09-18 DIAGNOSIS — N17.9 ACUTE RENAL FAILURE SUPERIMPOSED ON STAGE 3 CHRONIC KIDNEY DISEASE, UNSPECIFIED ACUTE RENAL FAILURE TYPE: Primary | ICD-10-CM

## 2018-09-18 DIAGNOSIS — J90 PLEURAL EFFUSION: ICD-10-CM

## 2018-09-18 DIAGNOSIS — N18.3 ACUTE RENAL FAILURE SUPERIMPOSED ON STAGE 3 CHRONIC KIDNEY DISEASE, UNSPECIFIED ACUTE RENAL FAILURE TYPE: Primary | ICD-10-CM

## 2018-09-18 DIAGNOSIS — R60.9 EDEMA, UNSPECIFIED TYPE: ICD-10-CM

## 2018-09-18 DIAGNOSIS — I25.9 CHRONIC ISCHEMIC HEART DISEASE: ICD-10-CM

## 2018-09-18 DIAGNOSIS — I48.20 CHRONIC ATRIAL FIBRILLATION (HCC): ICD-10-CM

## 2018-09-18 DIAGNOSIS — I10 HYPERTENSION, BENIGN: ICD-10-CM

## 2018-09-18 PROCEDURE — G0463 HOSPITAL OUTPT CLINIC VISIT: HCPCS | Performed by: INTERNAL MEDICINE

## 2018-09-18 PROCEDURE — 99214 OFFICE O/P EST MOD 30 MIN: CPT | Performed by: INTERNAL MEDICINE

## 2018-09-18 NOTE — PROGRESS NOTES
Nadeem Ramirez is a 74year old male. HPI:   He is doing well and no new issues. He saw Dr Gavin Rodrigues and nothing new. No ED or UC visits. He has made tremendous progress - he is on prednisone 5 mg daily. Energy much improved; appetite good.      Wayne Macias finasteride 5 MG Oral Tab Take 1 tablet (5 mg total) by mouth daily. Disp: 90 tablet Rfl: 3   rivaroxaban (XARELTO) 15 MG Oral Tab Take 15 mg by mouth daily with food. Disp:  Rfl:    Labetalol HCl 200 MG Oral Tab Take 200 mg by mouth 2 (two) times daily. Years: 45.00        Pack years: 39        Types: Cigarettes        Start date: 10/3/1958        Quit date: 1993        Years since quittin.7      Smokeless tobacco: Never Used    Alcohol use:  Yes      Alcohol/week: 3.6 oz      Types: 6 Standard dr

## 2018-09-25 RX ORDER — PREDNISONE 1 MG/1
TABLET ORAL
Qty: 100 TABLET | Refills: 0 | OUTPATIENT
Start: 2018-09-25

## 2018-11-16 ENCOUNTER — PRIOR ORIGINAL RECORDS (OUTPATIENT)
Dept: OTHER | Age: 73
End: 2018-11-16

## 2018-11-16 ENCOUNTER — LAB ENCOUNTER (OUTPATIENT)
Dept: LAB | Facility: HOSPITAL | Age: 73
End: 2018-11-16
Attending: INTERNAL MEDICINE
Payer: MEDICARE

## 2018-11-16 DIAGNOSIS — I10 HYPERTENSION, BENIGN: ICD-10-CM

## 2018-11-16 PROCEDURE — 80061 LIPID PANEL: CPT

## 2018-11-16 PROCEDURE — 84443 ASSAY THYROID STIM HORMONE: CPT

## 2018-11-16 PROCEDURE — 85025 COMPLETE CBC W/AUTO DIFF WBC: CPT

## 2018-11-16 PROCEDURE — 80053 COMPREHEN METABOLIC PANEL: CPT

## 2018-11-16 PROCEDURE — 83735 ASSAY OF MAGNESIUM: CPT

## 2018-11-16 PROCEDURE — 36415 COLL VENOUS BLD VENIPUNCTURE: CPT

## 2018-11-16 RX ORDER — FINASTERIDE 5 MG/1
TABLET, FILM COATED ORAL
Qty: 90 TABLET | Refills: 0 | Status: SHIPPED | OUTPATIENT
Start: 2018-11-16 | End: 2019-06-03

## 2018-12-11 ENCOUNTER — OFFICE VISIT (OUTPATIENT)
Dept: INTERNAL MEDICINE CLINIC | Facility: CLINIC | Age: 73
End: 2018-12-11
Payer: MEDICARE

## 2018-12-11 VITALS
HEART RATE: 55 BPM | DIASTOLIC BLOOD PRESSURE: 66 MMHG | BODY MASS INDEX: 27.46 KG/M2 | RESPIRATION RATE: 20 BRPM | HEIGHT: 74 IN | OXYGEN SATURATION: 97 % | SYSTOLIC BLOOD PRESSURE: 120 MMHG | TEMPERATURE: 98 F | WEIGHT: 214 LBS

## 2018-12-11 DIAGNOSIS — I48.20 CHRONIC ATRIAL FIBRILLATION (HCC): ICD-10-CM

## 2018-12-11 DIAGNOSIS — J90 PLEURAL EFFUSION: Primary | ICD-10-CM

## 2018-12-11 DIAGNOSIS — N28.9 RENAL INSUFFICIENCY: ICD-10-CM

## 2018-12-11 DIAGNOSIS — I10 HYPERTENSION, BENIGN: ICD-10-CM

## 2018-12-11 DIAGNOSIS — I25.9 CHRONIC ISCHEMIC HEART DISEASE: ICD-10-CM

## 2018-12-11 PROCEDURE — G0463 HOSPITAL OUTPT CLINIC VISIT: HCPCS | Performed by: INTERNAL MEDICINE

## 2018-12-11 PROCEDURE — 99214 OFFICE O/P EST MOD 30 MIN: CPT | Performed by: INTERNAL MEDICINE

## 2018-12-11 RX ORDER — FUROSEMIDE 20 MG/1
20 TABLET ORAL DAILY
Qty: 90 TABLET | Refills: 3 | Status: SHIPPED | OUTPATIENT
Start: 2018-12-11 | End: 2019-08-12 | Stop reason: ALTCHOICE

## 2018-12-11 NOTE — PROGRESS NOTES
Brant Lemon is a 74year old male. HPI:   He is markedly improved - breathing is back to normal and he cut down prednisone to 2.5 mg daily.  He does have some low back pain    Creatinine up to 2.3 from 1.8    Hb up to 9.6    LDL 45    Eating well, sleep Labetalol HCl 200 MG Oral Tab Take 200 mg by mouth 2 (two) times daily. Disp:  Rfl:    Vitamin B-12 (VITAMIN B12) 1000 MCG Oral Tab Take  by mouth.  take 1 by Oral route in the morning Disp:  Rfl:    Ferrous Sulfate 325 (65 FE) MG Oral Tab Take 325 mg by Used    Alcohol use:  Yes      Alcohol/week: 3.6 oz      Types: 6 Standard drinks or equivalent per week      Comment: drinks occasionally    Drug use: No       REVIEW OF SYSTEMS:   GENERAL HEALTH: feels well otherwise  SKIN: denies any unusual skin lesions

## 2019-01-15 ENCOUNTER — LAB ENCOUNTER (OUTPATIENT)
Dept: LAB | Facility: HOSPITAL | Age: 74
End: 2019-01-15
Attending: INTERNAL MEDICINE
Payer: MEDICARE

## 2019-01-15 DIAGNOSIS — I10 HYPERTENSION, BENIGN: ICD-10-CM

## 2019-01-15 LAB
ALBUMIN SERPL BCP-MCNC: 3.5 G/DL (ref 3.5–4.8)
ALBUMIN/GLOB SERPL: 1.2 {RATIO} (ref 1–2)
ALP SERPL-CCNC: 76 U/L (ref 32–100)
ALT SERPL-CCNC: 17 U/L (ref 17–63)
ANION GAP SERPL CALC-SCNC: 16 MMOL/L (ref 0–18)
AST SERPL-CCNC: 25 U/L (ref 15–41)
BASOPHILS # BLD: 0 K/UL (ref 0–0.2)
BASOPHILS NFR BLD: 1 %
BILIRUB SERPL-MCNC: 0.7 MG/DL (ref 0.3–1.2)
BUN SERPL-MCNC: 86 MG/DL (ref 8–20)
BUN/CREAT SERPL: 29 (ref 10–20)
CALCIUM SERPL-MCNC: 9.3 MG/DL (ref 8.5–10.5)
CHLORIDE SERPL-SCNC: 98 MMOL/L (ref 95–110)
CO2 SERPL-SCNC: 26 MMOL/L (ref 22–32)
CREAT SERPL-MCNC: 2.97 MG/DL (ref 0.5–1.5)
EOSINOPHIL # BLD: 0.1 K/UL (ref 0–0.7)
EOSINOPHIL NFR BLD: 2 %
ERYTHROCYTE [DISTWIDTH] IN BLOOD BY AUTOMATED COUNT: 16.6 % (ref 11–15)
GLOBULIN PLAS-MCNC: 2.9 G/DL (ref 2.5–3.7)
GLUCOSE SERPL-MCNC: 99 MG/DL (ref 70–99)
HCT VFR BLD AUTO: 30.4 % (ref 41–52)
HGB BLD-MCNC: 9.9 G/DL (ref 13.5–17.5)
LYMPHOCYTES # BLD: 0.3 K/UL (ref 1–4)
LYMPHOCYTES NFR BLD: 6 %
MAGNESIUM SERPL-MCNC: 1.8 MG/DL (ref 1.8–2.5)
MCH RBC QN AUTO: 30.4 PG (ref 27–32)
MCHC RBC AUTO-ENTMCNC: 32.7 G/DL (ref 32–37)
MCV RBC AUTO: 93.1 FL (ref 80–100)
MONOCYTES # BLD: 0.5 K/UL (ref 0–1)
MONOCYTES NFR BLD: 9 %
NEUTROPHILS # BLD AUTO: 4.6 K/UL (ref 1.8–7.7)
NEUTROPHILS NFR BLD: 82 %
OSMOLALITY UR CALC.SUM OF ELEC: 316 MOSM/KG (ref 275–295)
PATIENT FASTING: YES
PLATELET # BLD AUTO: 181 K/UL (ref 140–400)
PMV BLD AUTO: 8.9 FL (ref 7.4–10.3)
POTASSIUM SERPL-SCNC: 3.9 MMOL/L (ref 3.3–5.1)
PROT SERPL-MCNC: 6.4 G/DL (ref 5.9–8.4)
RBC # BLD AUTO: 3.27 M/UL (ref 4.5–5.9)
SODIUM SERPL-SCNC: 140 MMOL/L (ref 136–144)
WBC # BLD AUTO: 5.6 K/UL (ref 4–11)

## 2019-01-15 PROCEDURE — 83735 ASSAY OF MAGNESIUM: CPT

## 2019-01-15 PROCEDURE — 85025 COMPLETE CBC W/AUTO DIFF WBC: CPT

## 2019-01-15 PROCEDURE — 80053 COMPREHEN METABOLIC PANEL: CPT

## 2019-01-15 PROCEDURE — 36415 COLL VENOUS BLD VENIPUNCTURE: CPT

## 2019-01-20 ENCOUNTER — TELEPHONE (OUTPATIENT)
Dept: INTERNAL MEDICINE CLINIC | Facility: CLINIC | Age: 74
End: 2019-01-20

## 2019-01-20 NOTE — TELEPHONE ENCOUNTER
I did d/w Milas Beams and advised he stop indapamide and decrease Lasix to 20 mg per day if needed for swelling. Repeat labs in 2 weeks.

## 2019-02-06 ENCOUNTER — APPOINTMENT (OUTPATIENT)
Dept: LAB | Facility: HOSPITAL | Age: 74
End: 2019-02-06
Attending: INTERNAL MEDICINE
Payer: MEDICARE

## 2019-02-06 DIAGNOSIS — N18.3 ACUTE RENAL FAILURE SUPERIMPOSED ON STAGE 3 CHRONIC KIDNEY DISEASE, UNSPECIFIED ACUTE RENAL FAILURE TYPE: ICD-10-CM

## 2019-02-06 DIAGNOSIS — N17.9 ACUTE RENAL FAILURE SUPERIMPOSED ON STAGE 3 CHRONIC KIDNEY DISEASE, UNSPECIFIED ACUTE RENAL FAILURE TYPE: ICD-10-CM

## 2019-02-06 LAB
ALBUMIN SERPL BCP-MCNC: 3.5 G/DL (ref 3.5–4.8)
ANION GAP SERPL CALC-SCNC: 14 MMOL/L (ref 0–18)
BUN SERPL-MCNC: 97 MG/DL (ref 8–20)
BUN/CREAT SERPL: 32 (ref 10–20)
CALCIUM SERPL-MCNC: 9.1 MG/DL (ref 8.5–10.5)
CHLORIDE SERPL-SCNC: 105 MMOL/L (ref 95–110)
CO2 SERPL-SCNC: 21 MMOL/L (ref 22–32)
CREAT SERPL-MCNC: 3.03 MG/DL (ref 0.5–1.5)
GLUCOSE SERPL-MCNC: 114 MG/DL (ref 70–99)
HCT VFR BLD AUTO: 27.6 % (ref 39–53)
HGB BLD-MCNC: 8.7 G/DL (ref 13–17.5)
OSMOLALITY UR CALC.SUM OF ELEC: 321 MOSM/KG (ref 275–295)
PHOSPHATE SERPL-MCNC: 4.2 MG/DL (ref 2.4–4.7)
POTASSIUM SERPL-SCNC: 4.4 MMOL/L (ref 3.3–5.1)
PTH-INTACT SERPL-MCNC: 87.5 PG/ML (ref 12–88)
SODIUM SERPL-SCNC: 140 MMOL/L (ref 136–144)

## 2019-02-06 PROCEDURE — 85018 HEMOGLOBIN: CPT

## 2019-02-06 PROCEDURE — 80069 RENAL FUNCTION PANEL: CPT

## 2019-02-06 PROCEDURE — 85014 HEMATOCRIT: CPT

## 2019-02-06 PROCEDURE — 83970 ASSAY OF PARATHORMONE: CPT

## 2019-02-06 PROCEDURE — 36415 COLL VENOUS BLD VENIPUNCTURE: CPT

## 2019-02-11 ENCOUNTER — OFFICE VISIT (OUTPATIENT)
Dept: INTERNAL MEDICINE CLINIC | Facility: CLINIC | Age: 74
End: 2019-02-11
Payer: MEDICARE

## 2019-02-11 VITALS
BODY MASS INDEX: 28 KG/M2 | WEIGHT: 221 LBS | DIASTOLIC BLOOD PRESSURE: 72 MMHG | OXYGEN SATURATION: 100 % | SYSTOLIC BLOOD PRESSURE: 138 MMHG | HEART RATE: 84 BPM | TEMPERATURE: 98 F

## 2019-02-11 DIAGNOSIS — N18.3 ACUTE RENAL FAILURE SUPERIMPOSED ON STAGE 3 CHRONIC KIDNEY DISEASE, UNSPECIFIED ACUTE RENAL FAILURE TYPE: ICD-10-CM

## 2019-02-11 DIAGNOSIS — N17.9 ACUTE RENAL FAILURE SUPERIMPOSED ON STAGE 3 CHRONIC KIDNEY DISEASE, UNSPECIFIED ACUTE RENAL FAILURE TYPE: ICD-10-CM

## 2019-02-11 DIAGNOSIS — N18.9 ANEMIA DUE TO CHRONIC KIDNEY DISEASE, UNSPECIFIED CKD STAGE: Primary | ICD-10-CM

## 2019-02-11 DIAGNOSIS — I10 HYPERTENSION, BENIGN: ICD-10-CM

## 2019-02-11 DIAGNOSIS — D63.1 ANEMIA DUE TO CHRONIC KIDNEY DISEASE, UNSPECIFIED CKD STAGE: Primary | ICD-10-CM

## 2019-02-11 DIAGNOSIS — R60.9 EDEMA, UNSPECIFIED TYPE: ICD-10-CM

## 2019-02-11 DIAGNOSIS — R06.00 DYSPNEA, UNSPECIFIED TYPE: ICD-10-CM

## 2019-02-11 PROCEDURE — 99214 OFFICE O/P EST MOD 30 MIN: CPT | Performed by: INTERNAL MEDICINE

## 2019-02-11 PROCEDURE — G0463 HOSPITAL OUTPT CLINIC VISIT: HCPCS | Performed by: INTERNAL MEDICINE

## 2019-02-11 NOTE — PROGRESS NOTES
Odalis Anderson is a 74year old male. HPI:   He has been stable and breathing reasonably well. His renal function has declined and Dr Leno Emanuel stopped Lasix and I had stopped indapamide previously. No chest pain or unusual sob.     IHD -  No chest pain  - he morning Disp:  Rfl:    Ferrous Sulfate 325 (65 FE) MG Oral Tab Take 325 mg by mouth daily with breakfast.   Disp:  Rfl:    folic acid (FOLVITE) 786 MCG Oral Tab Take  by mouth.  take 1 tablet (0.4MG)  by oral route  every day Disp:  Rfl:    furosemide 20 MG Start date: 10/3/1958        Quit date: 1993        Years since quittin.1      Smokeless tobacco: Never Used    Alcohol use:  Yes      Alcohol/week: 3.6 oz      Types: 6 Standard drinks or equivalent per week      Comment: drinks occasionally    Dr

## 2019-02-12 ENCOUNTER — MYAURORA ACCOUNT LINK (OUTPATIENT)
Dept: OTHER | Age: 74
End: 2019-02-12

## 2019-02-12 ENCOUNTER — PRIOR ORIGINAL RECORDS (OUTPATIENT)
Dept: OTHER | Age: 74
End: 2019-02-12

## 2019-02-12 LAB
ALBUMIN: 3.3 G/DL
ALKALINE PHOSPHATATE(ALK PHOS): 73 IU/L
ALT (SGPT): 17 U/L
AST (SGOT): 21 U/L
BILIRUBIN TOTAL: 0.9 MG/DL
BUN: 73 MG/DL
CALCIUM: 9.1 MG/DL
CHLORIDE: 102 MEQ/L
CHOLESTEROL, TOTAL: 147 MG/DL
CREATININE, SERUM: 2.31 MG/DL
GLOBULIN: 1.1 G/DL
GLUCOSE: 107 MG/DL
GLUCOSE: 107 MG/DL
HDL CHOLESTEROL: 94 MG/DL
HEMATOCRIT: 29.2 %
HEMOGLOBIN: 9.6 G/DL
LDL CHOLESTEROL: 45 MG/DL
NON-HDL CHOLESTEROL: 53 MG/DL
PLATELETS: 211 K/UL
POTASSIUM, SERUM: 3.9 MEQ/L
PROTEIN, TOTAL: 6.4 G/DL
RED BLOOD COUNT: 3.21 X 10-6/U
SGOT (AST): 21 IU/L
SGPT (ALT): 17 IU/L
SODIUM: 140 MEQ/L
THYROID STIMULATING HORMONE: 1.83 MLU/L
TRIGLYCERIDES: 41 MG/DL
WHITE BLOOD COUNT: 6.2 X 10-3/U

## 2019-02-16 ENCOUNTER — LAB ENCOUNTER (OUTPATIENT)
Dept: LAB | Facility: HOSPITAL | Age: 74
End: 2019-02-16
Attending: INTERNAL MEDICINE
Payer: MEDICARE

## 2019-02-16 DIAGNOSIS — D63.1 ANEMIA DUE TO CHRONIC KIDNEY DISEASE, UNSPECIFIED CKD STAGE: ICD-10-CM

## 2019-02-16 DIAGNOSIS — N18.3 ACUTE RENAL FAILURE SUPERIMPOSED ON STAGE 3 CHRONIC KIDNEY DISEASE, UNSPECIFIED ACUTE RENAL FAILURE TYPE: ICD-10-CM

## 2019-02-16 DIAGNOSIS — N18.9 ANEMIA DUE TO CHRONIC KIDNEY DISEASE, UNSPECIFIED CKD STAGE: ICD-10-CM

## 2019-02-16 DIAGNOSIS — N17.9 ACUTE RENAL FAILURE SUPERIMPOSED ON STAGE 3 CHRONIC KIDNEY DISEASE, UNSPECIFIED ACUTE RENAL FAILURE TYPE: ICD-10-CM

## 2019-02-16 DIAGNOSIS — N18.30 CHRONIC KIDNEY DISEASE, STAGE III (MODERATE) (HCC): ICD-10-CM

## 2019-02-16 DIAGNOSIS — D64.9 ANEMIA, UNSPECIFIED TYPE: ICD-10-CM

## 2019-02-16 DIAGNOSIS — I10 HYPERTENSION, BENIGN: ICD-10-CM

## 2019-02-16 LAB
ALBUMIN SERPL-MCNC: 3.4 G/DL (ref 3.4–5)
ALBUMIN/GLOB SERPL: 1.1 {RATIO} (ref 1–2)
ALP LIVER SERPL-CCNC: 88 U/L (ref 45–117)
ALT SERPL-CCNC: 23 U/L (ref 16–61)
ANION GAP SERPL CALC-SCNC: 9 MMOL/L (ref 0–18)
AST SERPL-CCNC: 14 U/L (ref 15–37)
BASOPHILS # BLD AUTO: 0.03 X10(3) UL (ref 0–0.2)
BASOPHILS NFR BLD AUTO: 0.4 %
BILIRUB SERPL-MCNC: 0.5 MG/DL (ref 0.1–2)
BUN BLD-MCNC: 74 MG/DL (ref 7–18)
BUN/CREAT SERPL: 29 (ref 10–20)
CALCIUM BLD-MCNC: 9 MG/DL (ref 8.5–10.1)
CHLORIDE SERPL-SCNC: 113 MMOL/L (ref 98–107)
CO2 SERPL-SCNC: 22 MMOL/L (ref 21–32)
CREAT BLD-MCNC: 2.55 MG/DL (ref 0.7–1.3)
DEPRECATED HBV CORE AB SER IA-ACNC: 40.2 NG/ML (ref 30–530)
DEPRECATED RDW RBC AUTO: 61.1 FL (ref 35.1–46.3)
EOSINOPHIL # BLD AUTO: 0.08 X10(3) UL (ref 0–0.7)
EOSINOPHIL NFR BLD AUTO: 1.1 %
ERYTHROCYTE [DISTWIDTH] IN BLOOD BY AUTOMATED COUNT: 16.2 % (ref 11–15)
GLOBULIN PLAS-MCNC: 3 G/DL (ref 2.8–4.4)
GLUCOSE BLD-MCNC: 102 MG/DL (ref 70–99)
HCT VFR BLD AUTO: 29.3 % (ref 39–53)
HGB BLD-MCNC: 8.6 G/DL (ref 13–17.5)
IMM GRANULOCYTES # BLD AUTO: 0.06 X10(3) UL (ref 0–1)
IMM GRANULOCYTES NFR BLD: 0.8 %
LYMPHOCYTES # BLD AUTO: 0.37 X10(3) UL (ref 1–4)
LYMPHOCYTES NFR BLD AUTO: 5.2 %
M PROTEIN MFR SERPL ELPH: 6.4 G/DL (ref 6.4–8.2)
MCH RBC QN AUTO: 30.2 PG (ref 26–34)
MCHC RBC AUTO-ENTMCNC: 29.4 G/DL (ref 31–37)
MCV RBC AUTO: 102.8 FL (ref 80–100)
MONOCYTES # BLD AUTO: 0.57 X10(3) UL (ref 0.1–1)
MONOCYTES NFR BLD AUTO: 8 %
NEUTROPHILS # BLD AUTO: 6.03 X10 (3) UL (ref 1.5–7.7)
NEUTROPHILS # BLD AUTO: 6.03 X10(3) UL (ref 1.5–7.7)
NEUTROPHILS NFR BLD AUTO: 84.5 %
OSMOLALITY SERPL CALC.SUM OF ELEC: 320 MOSM/KG (ref 275–295)
PHOSPHATE SERPL-MCNC: 3.3 MG/DL (ref 2.5–4.9)
PLATELET # BLD AUTO: 211 10(3)UL (ref 150–450)
POTASSIUM SERPL-SCNC: 4.6 MMOL/L (ref 3.5–5.1)
RBC # BLD AUTO: 2.85 X10(6)UL (ref 3.8–5.8)
SODIUM SERPL-SCNC: 144 MMOL/L (ref 136–145)
WBC # BLD AUTO: 7.1 X10(3) UL (ref 4–11)

## 2019-02-16 PROCEDURE — 84100 ASSAY OF PHOSPHORUS: CPT

## 2019-02-16 PROCEDURE — 82728 ASSAY OF FERRITIN: CPT

## 2019-02-16 PROCEDURE — 36415 COLL VENOUS BLD VENIPUNCTURE: CPT

## 2019-02-16 PROCEDURE — 80053 COMPREHEN METABOLIC PANEL: CPT

## 2019-02-16 PROCEDURE — 85025 COMPLETE CBC W/AUTO DIFF WBC: CPT

## 2019-02-19 RX ORDER — PANTOPRAZOLE SODIUM 40 MG/1
40 TABLET, DELAYED RELEASE ORAL
Qty: 90 TABLET | Refills: 3 | Status: SHIPPED | OUTPATIENT
Start: 2019-02-19 | End: 2020-01-29

## 2019-02-28 VITALS
HEART RATE: 65 BPM | OXYGEN SATURATION: 99 % | HEIGHT: 72 IN | BODY MASS INDEX: 29.26 KG/M2 | DIASTOLIC BLOOD PRESSURE: 80 MMHG | SYSTOLIC BLOOD PRESSURE: 180 MMHG | WEIGHT: 216 LBS

## 2019-02-28 VITALS
HEART RATE: 60 BPM | WEIGHT: 215 LBS | OXYGEN SATURATION: 96 % | HEIGHT: 73 IN | DIASTOLIC BLOOD PRESSURE: 60 MMHG | BODY MASS INDEX: 28.49 KG/M2 | SYSTOLIC BLOOD PRESSURE: 110 MMHG

## 2019-02-28 VITALS
DIASTOLIC BLOOD PRESSURE: 84 MMHG | WEIGHT: 211 LBS | SYSTOLIC BLOOD PRESSURE: 198 MMHG | HEART RATE: 85 BPM | OXYGEN SATURATION: 97 % | BODY MASS INDEX: 26.24 KG/M2 | HEIGHT: 75 IN

## 2019-02-28 VITALS
OXYGEN SATURATION: 99 % | SYSTOLIC BLOOD PRESSURE: 138 MMHG | HEIGHT: 72 IN | DIASTOLIC BLOOD PRESSURE: 60 MMHG | BODY MASS INDEX: 27.5 KG/M2 | HEART RATE: 59 BPM | WEIGHT: 203 LBS

## 2019-02-28 VITALS
WEIGHT: 206 LBS | HEIGHT: 72 IN | DIASTOLIC BLOOD PRESSURE: 58 MMHG | OXYGEN SATURATION: 96 % | HEART RATE: 61 BPM | SYSTOLIC BLOOD PRESSURE: 108 MMHG | BODY MASS INDEX: 27.9 KG/M2

## 2019-02-28 VITALS
DIASTOLIC BLOOD PRESSURE: 74 MMHG | BODY MASS INDEX: 24.49 KG/M2 | HEIGHT: 75 IN | HEART RATE: 72 BPM | OXYGEN SATURATION: 99 % | WEIGHT: 197 LBS | SYSTOLIC BLOOD PRESSURE: 140 MMHG

## 2019-03-05 ENCOUNTER — PRIOR ORIGINAL RECORDS (OUTPATIENT)
Dept: HEALTH INFORMATION MANAGEMENT | Facility: OTHER | Age: 74
End: 2019-03-05

## 2019-03-07 ENCOUNTER — APPOINTMENT (OUTPATIENT)
Dept: LAB | Age: 74
End: 2019-03-07
Attending: INTERNAL MEDICINE
Payer: MEDICARE

## 2019-03-07 DIAGNOSIS — N17.9 ACUTE RENAL FAILURE SUPERIMPOSED ON STAGE 3 CHRONIC KIDNEY DISEASE, UNSPECIFIED ACUTE RENAL FAILURE TYPE: ICD-10-CM

## 2019-03-07 DIAGNOSIS — N18.3 ACUTE RENAL FAILURE SUPERIMPOSED ON STAGE 3 CHRONIC KIDNEY DISEASE, UNSPECIFIED ACUTE RENAL FAILURE TYPE: ICD-10-CM

## 2019-03-07 LAB
ALBUMIN SERPL-MCNC: 3.3 G/DL (ref 3.4–5)
ANION GAP SERPL CALC-SCNC: 9 MMOL/L (ref 0–18)
BUN BLD-MCNC: 66 MG/DL (ref 7–18)
BUN/CREAT SERPL: 25.8 (ref 10–20)
CALCIUM BLD-MCNC: 9.4 MG/DL (ref 8.5–10.1)
CHLORIDE SERPL-SCNC: 106 MMOL/L (ref 98–107)
CO2 SERPL-SCNC: 25 MMOL/L (ref 21–32)
CREAT BLD-MCNC: 2.56 MG/DL (ref 0.7–1.3)
GLUCOSE BLD-MCNC: 96 MG/DL (ref 70–99)
OSMOLALITY SERPL CALC.SUM OF ELEC: 309 MOSM/KG (ref 275–295)
PHOSPHATE SERPL-MCNC: 3.9 MG/DL (ref 2.5–4.9)
POTASSIUM SERPL-SCNC: 4.1 MMOL/L (ref 3.5–5.1)
SODIUM SERPL-SCNC: 140 MMOL/L (ref 136–145)

## 2019-03-07 PROCEDURE — 80069 RENAL FUNCTION PANEL: CPT

## 2019-03-07 PROCEDURE — 36415 COLL VENOUS BLD VENIPUNCTURE: CPT

## 2019-03-09 ENCOUNTER — TELEPHONE (OUTPATIENT)
Dept: INTERNAL MEDICINE CLINIC | Facility: CLINIC | Age: 74
End: 2019-03-09

## 2019-03-09 DIAGNOSIS — E78.00 HYPERCHOLESTEREMIA: Primary | ICD-10-CM

## 2019-03-09 DIAGNOSIS — E61.1 IRON DEFICIENCY: ICD-10-CM

## 2019-03-18 ENCOUNTER — LAB ENCOUNTER (OUTPATIENT)
Dept: LAB | Age: 74
End: 2019-03-18
Attending: INTERNAL MEDICINE
Payer: MEDICARE

## 2019-03-18 ENCOUNTER — TELEPHONE (OUTPATIENT)
Dept: INTERNAL MEDICINE CLINIC | Facility: CLINIC | Age: 74
End: 2019-03-18

## 2019-03-18 DIAGNOSIS — N17.9 ACUTE RENAL FAILURE SUPERIMPOSED ON STAGE 3 CHRONIC KIDNEY DISEASE, UNSPECIFIED ACUTE RENAL FAILURE TYPE: ICD-10-CM

## 2019-03-18 DIAGNOSIS — E78.00 HYPERCHOLESTEREMIA: ICD-10-CM

## 2019-03-18 DIAGNOSIS — N18.3 ACUTE RENAL FAILURE SUPERIMPOSED ON STAGE 3 CHRONIC KIDNEY DISEASE, UNSPECIFIED ACUTE RENAL FAILURE TYPE: ICD-10-CM

## 2019-03-18 DIAGNOSIS — E61.1 IRON DEFICIENCY: ICD-10-CM

## 2019-03-18 LAB
ALBUMIN SERPL-MCNC: 3.4 G/DL (ref 3.4–5)
ALBUMIN/GLOB SERPL: 1.2 {RATIO} (ref 1–2)
ALP LIVER SERPL-CCNC: 72 U/L (ref 45–117)
ALT SERPL-CCNC: 19 U/L (ref 16–61)
ANION GAP SERPL CALC-SCNC: 8 MMOL/L (ref 0–18)
AST SERPL-CCNC: 13 U/L (ref 15–37)
BASOPHILS # BLD AUTO: 0.02 X10(3) UL (ref 0–0.2)
BASOPHILS NFR BLD AUTO: 0.3 %
BILIRUB SERPL-MCNC: 0.6 MG/DL (ref 0.1–2)
BUN BLD-MCNC: 52 MG/DL (ref 7–18)
BUN/CREAT SERPL: 23 (ref 10–20)
CALCIUM BLD-MCNC: 9 MG/DL (ref 8.5–10.1)
CHLORIDE SERPL-SCNC: 109 MMOL/L (ref 98–107)
CHOLEST SMN-MCNC: 146 MG/DL (ref ?–200)
CO2 SERPL-SCNC: 27 MMOL/L (ref 21–32)
CREAT BLD-MCNC: 2.26 MG/DL (ref 0.7–1.3)
DEPRECATED HBV CORE AB SER IA-ACNC: 45.6 NG/ML (ref 30–530)
DEPRECATED RDW RBC AUTO: 54.2 FL (ref 35.1–46.3)
EOSINOPHIL # BLD AUTO: 0.06 X10(3) UL (ref 0–0.7)
EOSINOPHIL NFR BLD AUTO: 0.8 %
ERYTHROCYTE [DISTWIDTH] IN BLOOD BY AUTOMATED COUNT: 14.6 % (ref 11–15)
GLOBULIN PLAS-MCNC: 2.9 G/DL (ref 2.8–4.4)
GLUCOSE BLD-MCNC: 90 MG/DL (ref 70–99)
HAV IGM SER QL: 1.8 MG/DL (ref 1.6–2.6)
HCT VFR BLD AUTO: 28.1 % (ref 39–53)
HDLC SERPL-MCNC: 97 MG/DL (ref 40–59)
HGB BLD-MCNC: 8.6 G/DL (ref 13–17.5)
IMM GRANULOCYTES # BLD AUTO: 0.03 X10(3) UL (ref 0–1)
IMM GRANULOCYTES NFR BLD: 0.4 %
LDLC SERPL CALC-MCNC: 39 MG/DL (ref ?–100)
LYMPHOCYTES # BLD AUTO: 0.34 X10(3) UL (ref 1–4)
LYMPHOCYTES NFR BLD AUTO: 4.7 %
M PROTEIN MFR SERPL ELPH: 6.3 G/DL (ref 6.4–8.2)
MCH RBC QN AUTO: 31.2 PG (ref 26–34)
MCHC RBC AUTO-ENTMCNC: 30.6 G/DL (ref 31–37)
MCV RBC AUTO: 101.8 FL (ref 80–100)
MONOCYTES # BLD AUTO: 0.61 X10(3) UL (ref 0.1–1)
MONOCYTES NFR BLD AUTO: 8.4 %
NEUTROPHILS # BLD AUTO: 6.23 X10 (3) UL (ref 1.5–7.7)
NEUTROPHILS # BLD AUTO: 6.23 X10(3) UL (ref 1.5–7.7)
NEUTROPHILS NFR BLD AUTO: 85.4 %
NONHDLC SERPL-MCNC: 49 MG/DL (ref ?–130)
OSMOLALITY SERPL CALC.SUM OF ELEC: 312 MOSM/KG (ref 275–295)
PHOSPHATE SERPL-MCNC: 3.3 MG/DL (ref 2.5–4.9)
PLATELET # BLD AUTO: 179 10(3)UL (ref 150–450)
POTASSIUM SERPL-SCNC: 4.6 MMOL/L (ref 3.5–5.1)
RBC # BLD AUTO: 2.76 X10(6)UL (ref 3.8–5.8)
SODIUM SERPL-SCNC: 144 MMOL/L (ref 136–145)
TRIGL SERPL-MCNC: 51 MG/DL (ref 30–149)
TSI SER-ACNC: 1.6 MIU/ML (ref 0.36–3.74)
VLDLC SERPL CALC-MCNC: 10 MG/DL (ref 0–30)
WBC # BLD AUTO: 7.3 X10(3) UL (ref 4–11)

## 2019-03-18 PROCEDURE — 82728 ASSAY OF FERRITIN: CPT

## 2019-03-18 PROCEDURE — 83735 ASSAY OF MAGNESIUM: CPT

## 2019-03-18 PROCEDURE — 84443 ASSAY THYROID STIM HORMONE: CPT

## 2019-03-18 PROCEDURE — 80061 LIPID PANEL: CPT

## 2019-03-18 PROCEDURE — 84100 ASSAY OF PHOSPHORUS: CPT

## 2019-03-18 PROCEDURE — 85025 COMPLETE CBC W/AUTO DIFF WBC: CPT

## 2019-03-18 PROCEDURE — 36415 COLL VENOUS BLD VENIPUNCTURE: CPT

## 2019-03-18 PROCEDURE — 80053 COMPREHEN METABOLIC PANEL: CPT

## 2019-03-18 NOTE — TELEPHONE ENCOUNTER
Call received from [facility] asking that Sera Richards please review patients 3/18/19 lab results and advise in his absence. Per , patient with renal insufficiency and mild anemia with recent changes to his medications.  To Sera Richards

## 2019-03-18 NOTE — TELEPHONE ENCOUNTER
Labs reviewed. BUN 52 and creatinine 2.26. This is improved from last month. Also hemoglobin 8.6 which is the same is 1 month ago. Although he has been in the 9 range prior.   Please call patient and let him know that in the absence of Dr. Celestine Nino

## 2019-03-19 NOTE — TELEPHONE ENCOUNTER
Dr. Indira Michel message relayed to pt who verbalized understanding. Pt states he is \"feeling great\", denied weakness. Pt will see Dr. Maggie Jackson at his regularly scheduled appt.

## 2019-04-01 RX ORDER — ISOSORBIDE MONONITRATE 30 MG/1
TABLET, EXTENDED RELEASE ORAL
Qty: 90 TABLET | Refills: 3 | Status: SHIPPED | OUTPATIENT
Start: 2019-04-01 | End: 2020-03-13

## 2019-04-01 RX ORDER — SIMVASTATIN 40 MG
TABLET ORAL
Qty: 90 TABLET | Refills: 3 | Status: SHIPPED | OUTPATIENT
Start: 2019-04-01 | End: 2020-03-13

## 2019-04-11 ENCOUNTER — APPOINTMENT (OUTPATIENT)
Dept: LAB | Age: 74
End: 2019-04-11
Attending: INTERNAL MEDICINE
Payer: MEDICARE

## 2019-04-11 DIAGNOSIS — N17.9 ACUTE RENAL FAILURE, UNSPECIFIED ACUTE RENAL FAILURE TYPE (HCC): ICD-10-CM

## 2019-04-11 PROCEDURE — 80069 RENAL FUNCTION PANEL: CPT

## 2019-04-11 PROCEDURE — 36415 COLL VENOUS BLD VENIPUNCTURE: CPT

## 2019-04-11 RX ORDER — DUTASTERIDE 0.5 MG/1
CAPSULE, LIQUID FILLED ORAL
COMMUNITY

## 2019-04-11 RX ORDER — SIMVASTATIN 40 MG
TABLET ORAL
COMMUNITY

## 2019-04-11 RX ORDER — LANOLIN ALCOHOL/MO/W.PET/CERES
CREAM (GRAM) TOPICAL
COMMUNITY

## 2019-04-11 RX ORDER — FERROUS SULFATE 325(65) MG
TABLET ORAL
COMMUNITY

## 2019-04-11 RX ORDER — PREDNISONE 2.5 MG/1
TABLET ORAL EVERY OTHER DAY
COMMUNITY

## 2019-04-11 RX ORDER — ISOSORBIDE MONONITRATE 30 MG/1
TABLET, EXTENDED RELEASE ORAL
COMMUNITY

## 2019-04-11 RX ORDER — LABETALOL 200 MG/1
TABLET, FILM COATED ORAL
COMMUNITY
End: 2019-05-19 | Stop reason: SDUPTHER

## 2019-04-11 RX ORDER — HYDRALAZINE HYDROCHLORIDE 25 MG/1
TABLET, FILM COATED ORAL
COMMUNITY

## 2019-04-11 RX ORDER — TAMSULOSIN HYDROCHLORIDE 0.4 MG/1
CAPSULE ORAL
COMMUNITY

## 2019-04-15 ENCOUNTER — OFFICE VISIT (OUTPATIENT)
Dept: INTERNAL MEDICINE CLINIC | Facility: CLINIC | Age: 74
End: 2019-04-15
Payer: MEDICARE

## 2019-04-15 VITALS
WEIGHT: 216 LBS | HEART RATE: 57 BPM | DIASTOLIC BLOOD PRESSURE: 72 MMHG | OXYGEN SATURATION: 98 % | HEIGHT: 74 IN | TEMPERATURE: 98 F | BODY MASS INDEX: 27.72 KG/M2 | SYSTOLIC BLOOD PRESSURE: 142 MMHG

## 2019-04-15 DIAGNOSIS — I25.9 CHRONIC ISCHEMIC HEART DISEASE: ICD-10-CM

## 2019-04-15 DIAGNOSIS — N18.3 ACUTE RENAL FAILURE SUPERIMPOSED ON STAGE 3 CHRONIC KIDNEY DISEASE, UNSPECIFIED ACUTE RENAL FAILURE TYPE: Primary | ICD-10-CM

## 2019-04-15 DIAGNOSIS — I10 HYPERTENSION, BENIGN: ICD-10-CM

## 2019-04-15 DIAGNOSIS — E61.1 IRON DEFICIENCY: ICD-10-CM

## 2019-04-15 DIAGNOSIS — N17.9 ACUTE RENAL FAILURE SUPERIMPOSED ON STAGE 3 CHRONIC KIDNEY DISEASE, UNSPECIFIED ACUTE RENAL FAILURE TYPE: Primary | ICD-10-CM

## 2019-04-15 PROCEDURE — 99214 OFFICE O/P EST MOD 30 MIN: CPT | Performed by: INTERNAL MEDICINE

## 2019-04-15 PROCEDURE — G0463 HOSPITAL OUTPT CLINIC VISIT: HCPCS | Performed by: INTERNAL MEDICINE

## 2019-04-15 NOTE — PROGRESS NOTES
Ric Reid is a 74year old male. HPI:   He is feeling well. Saw Dr Jayden Urbina and she felt BP too low at 100/60 - she stopped hydrazines and he has been checking BP and he self administers hydralazine 25 mg as needed. BP ave about 120/70.      Renal insuff Rfl: 1   ASPIRIN 81 MG Oral Tab Take 1 tablet (81 mg total) by mouth nightly. Disp: 30 tablet Rfl: 0   rivaroxaban (XARELTO) 15 MG Oral Tab Take 15 mg by mouth daily with food.  Disp:  Rfl:    Labetalol HCl 200 MG Oral Tab Take 200 mg by mouth 2 (two) times frequency of bowel movements    • Stroke [facility]) 1993   • UTI (urinary tract infection) 08/2017      Social History:  Social History    Tobacco Use      Smoking status: Former Smoker        Packs/day: 1.00        Years: 45.00        Pack years: 39        Typ

## 2019-05-17 ENCOUNTER — LAB ENCOUNTER (OUTPATIENT)
Dept: LAB | Age: 74
End: 2019-05-17
Attending: INTERNAL MEDICINE
Payer: MEDICARE

## 2019-05-17 DIAGNOSIS — N17.9 ACUTE RENAL FAILURE SUPERIMPOSED ON STAGE 3 CHRONIC KIDNEY DISEASE, UNSPECIFIED ACUTE RENAL FAILURE TYPE: ICD-10-CM

## 2019-05-17 DIAGNOSIS — E61.1 IRON DEFICIENCY: ICD-10-CM

## 2019-05-17 DIAGNOSIS — N18.3 ACUTE RENAL FAILURE SUPERIMPOSED ON STAGE 3 CHRONIC KIDNEY DISEASE, UNSPECIFIED ACUTE RENAL FAILURE TYPE: ICD-10-CM

## 2019-05-17 PROCEDURE — 80053 COMPREHEN METABOLIC PANEL: CPT

## 2019-05-17 PROCEDURE — 85025 COMPLETE CBC W/AUTO DIFF WBC: CPT

## 2019-05-17 PROCEDURE — 36415 COLL VENOUS BLD VENIPUNCTURE: CPT

## 2019-05-17 PROCEDURE — 82728 ASSAY OF FERRITIN: CPT

## 2019-05-20 RX ORDER — LABETALOL 200 MG/1
TABLET, FILM COATED ORAL
Qty: 60 TABLET | Refills: 5 | Status: SHIPPED | OUTPATIENT
Start: 2019-05-20 | End: 2019-11-05 | Stop reason: SDUPTHER

## 2019-05-27 ENCOUNTER — HOSPITAL ENCOUNTER (EMERGENCY)
Facility: HOSPITAL | Age: 74
Discharge: HOME | End: 2019-05-27
Attending: EMERGENCY MEDICINE
Payer: MEDICARE

## 2019-05-27 ENCOUNTER — APPOINTMENT (OUTPATIENT)
Dept: GENERAL RADIOLOGY | Facility: HOSPITAL | Age: 74
End: 2019-05-27
Attending: EMERGENCY MEDICINE
Payer: MEDICARE

## 2019-05-27 VITALS
OXYGEN SATURATION: 99 % | SYSTOLIC BLOOD PRESSURE: 162 MMHG | WEIGHT: 205 LBS | HEART RATE: 55 BPM | HEIGHT: 75 IN | BODY MASS INDEX: 25.49 KG/M2 | TEMPERATURE: 98 F | RESPIRATION RATE: 16 BRPM | DIASTOLIC BLOOD PRESSURE: 93 MMHG

## 2019-05-27 DIAGNOSIS — L03.011 CELLULITIS OF FINGER OF RIGHT HAND: Primary | ICD-10-CM

## 2019-05-27 PROCEDURE — 85025 COMPLETE CBC W/AUTO DIFF WBC: CPT | Performed by: EMERGENCY MEDICINE

## 2019-05-27 PROCEDURE — 99284 EMERGENCY DEPT VISIT MOD MDM: CPT

## 2019-05-27 PROCEDURE — 85652 RBC SED RATE AUTOMATED: CPT | Performed by: EMERGENCY MEDICINE

## 2019-05-27 PROCEDURE — 86140 C-REACTIVE PROTEIN: CPT | Performed by: EMERGENCY MEDICINE

## 2019-05-27 PROCEDURE — 80048 BASIC METABOLIC PNL TOTAL CA: CPT | Performed by: EMERGENCY MEDICINE

## 2019-05-27 PROCEDURE — 73140 X-RAY EXAM OF FINGER(S): CPT | Performed by: EMERGENCY MEDICINE

## 2019-05-27 PROCEDURE — 84550 ASSAY OF BLOOD/URIC ACID: CPT | Performed by: EMERGENCY MEDICINE

## 2019-05-27 PROCEDURE — 36415 COLL VENOUS BLD VENIPUNCTURE: CPT

## 2019-05-27 RX ORDER — HYDROCODONE BITARTRATE AND ACETAMINOPHEN 5; 325 MG/1; MG/1
1 TABLET ORAL EVERY 4 HOURS PRN
Qty: 10 TABLET | Refills: 0 | Status: SHIPPED | OUTPATIENT
Start: 2019-05-27 | End: 2019-06-03

## 2019-05-27 RX ORDER — SULFAMETHOXAZOLE AND TRIMETHOPRIM 800; 160 MG/1; MG/1
1 TABLET ORAL 2 TIMES DAILY
Qty: 20 TABLET | Refills: 0 | Status: SHIPPED | OUTPATIENT
Start: 2019-05-27 | End: 2019-05-28 | Stop reason: ALTCHOICE

## 2019-05-27 RX ORDER — CEPHALEXIN 500 MG/1
500 CAPSULE ORAL 4 TIMES DAILY
Qty: 40 CAPSULE | Refills: 0 | Status: SHIPPED | OUTPATIENT
Start: 2019-05-27 | End: 2019-06-06

## 2019-05-27 NOTE — ED PROVIDER NOTES
Patient Seen in: [facility] AND [facility] Emergency Department    History   Patient presents with:  Swelling Edema (cardiovascular, metabolic)    Stated Complaint:     HPI    64-year-old male presents for complaint of pain and swelling to his right second finge per NextGen:  \"Ischemic heart disease\";  \"Management:  PTCA (1994)\"   • COLONOSCOPY  09-,01/2015   • ELECTROCARDIOGRAM, COMPLETE  09-    Scanned to media tab - DOS 09-   • ESOPHAGOGASTRODUODENOSCOPY (EGD) N/A 1/13/2017    Performed Current:BP (!) 162/93   Pulse 55   Temp 97.8 °F (36.6 °C) (Temporal)   Resp 16   Ht 190.5 cm (6' 3\")   Wt 93 kg   SpO2 99%   BMI 25.62 kg/m²         Physical Exam   Constitutional: He is oriented to person, place, and time.  He appears well-developed and w HGB 9.4 (*)     HCT 30.3 (*)     RDW-SD 52.2 (*)     Lymphocyte Absolute 0.34 (*)     All other components within normal limits   CBC WITH DIFFERENTIAL WITH PLATELET    Narrative:      The following orders were created for panel order CBC WITH DIFFERENTIAL PROCEDURE: XR FINGER(S) (MIN 2 VIEWS), RIGHT 2ND (CPT=73140)  COMPARISON: None. INDICATIONS: Right second finger pain, redness and swelling x 3 days. No known trauma. TECHNIQUE: 3 views were obtained.    FINDINGS:  BONES: Mild joint space narrowing of th Take 1 tablet by mouth 2 (two) times daily for 10 days. , Print Script, Disp-20 tablet, R-0    HYDROcodone-acetaminophen 5-325 MG Oral Tab  Take 1 tablet by mouth every 4 (four) hours as needed., Normal, Disp-10 tablet, R-0

## 2019-05-28 ENCOUNTER — OFFICE VISIT (OUTPATIENT)
Dept: INTERNAL MEDICINE CLINIC | Facility: CLINIC | Age: 74
End: 2019-05-28
Payer: MEDICARE

## 2019-05-28 VITALS
HEIGHT: 74 IN | HEART RATE: 57 BPM | WEIGHT: 215 LBS | OXYGEN SATURATION: 98 % | DIASTOLIC BLOOD PRESSURE: 64 MMHG | TEMPERATURE: 99 F | RESPIRATION RATE: 16 BRPM | BODY MASS INDEX: 27.59 KG/M2 | SYSTOLIC BLOOD PRESSURE: 150 MMHG

## 2019-05-28 DIAGNOSIS — N28.9 RENAL INSUFFICIENCY: Primary | ICD-10-CM

## 2019-05-28 DIAGNOSIS — N18.9 ANEMIA DUE TO CHRONIC KIDNEY DISEASE, UNSPECIFIED CKD STAGE: ICD-10-CM

## 2019-05-28 DIAGNOSIS — M10.041 ACUTE IDIOPATHIC GOUT OF RIGHT HAND: ICD-10-CM

## 2019-05-28 DIAGNOSIS — D63.1 ANEMIA DUE TO CHRONIC KIDNEY DISEASE, UNSPECIFIED CKD STAGE: ICD-10-CM

## 2019-05-28 PROCEDURE — 99214 OFFICE O/P EST MOD 30 MIN: CPT | Performed by: INTERNAL MEDICINE

## 2019-05-28 PROCEDURE — G0463 HOSPITAL OUTPT CLINIC VISIT: HCPCS | Performed by: INTERNAL MEDICINE

## 2019-05-28 RX ORDER — METHYLPREDNISOLONE 4 MG/1
TABLET ORAL
Qty: 1 KIT | Refills: 0 | Status: SHIPPED | OUTPATIENT
Start: 2019-05-28 | End: 2019-06-03 | Stop reason: ALTCHOICE

## 2019-05-28 NOTE — PROGRESS NOTES
Zhen Matias is a 74year old male. HPI:   He awakened yesterday with acute pain in the right 2nd digit. PIP joint - he had had some minor pain for several days prior. No injury.  He went ot ED and was treated for cellulitis with sulfa and cefalexin whic needed. Disp:  Rfl:    hydrocortisone (PROCTO-MED HC) 2.5 % Rectal Cream Place 1 Application rectally 2 (two) times daily. Disp: 1 Tube Rfl: 1   ASPIRIN 81 MG Oral Tab Take 1 tablet (81 mg total) by mouth nightly.  Disp: 30 tablet Rfl: 0   rivaroxaban (XA artery 12/2/2013   • Recent change in frequency of bowel movements    • Stroke [facility]) 1993   • UTI (urinary tract infection) 08/2017      Social History:  Social History    Tobacco Use      Smoking status: Former Smoker        Packs/day: 1.00        Years:

## 2019-05-30 RX ORDER — FINASTERIDE 5 MG/1
TABLET, FILM COATED ORAL
Qty: 90 TABLET | Refills: 0 | OUTPATIENT
Start: 2019-05-30

## 2019-05-30 NOTE — TELEPHONE ENCOUNTER
Pt LOV with Dr. Hand Patches 1/23/18 pt has no upcoming haile and pharmacy requesting refill on finasteride. Refill denied pt needs to make appointment.

## 2019-05-31 NOTE — TELEPHONE ENCOUNTER
Pt called. Pt scheduled appt with ernst nayak on 6-11-19 at 1:30pm.  Can you refill rx.   Call pt to advise

## 2019-06-03 ENCOUNTER — OFFICE VISIT (OUTPATIENT)
Dept: INTERNAL MEDICINE CLINIC | Facility: CLINIC | Age: 74
End: 2019-06-03
Payer: MEDICARE

## 2019-06-03 VITALS
BODY MASS INDEX: 28 KG/M2 | TEMPERATURE: 98 F | DIASTOLIC BLOOD PRESSURE: 78 MMHG | WEIGHT: 219 LBS | HEART RATE: 81 BPM | SYSTOLIC BLOOD PRESSURE: 154 MMHG | OXYGEN SATURATION: 95 %

## 2019-06-03 DIAGNOSIS — M10.041 ACUTE IDIOPATHIC GOUT OF RIGHT HAND: Primary | ICD-10-CM

## 2019-06-03 DIAGNOSIS — I10 HYPERTENSION, BENIGN: ICD-10-CM

## 2019-06-03 PROCEDURE — G0463 HOSPITAL OUTPT CLINIC VISIT: HCPCS | Performed by: INTERNAL MEDICINE

## 2019-06-03 PROCEDURE — 99213 OFFICE O/P EST LOW 20 MIN: CPT | Performed by: INTERNAL MEDICINE

## 2019-06-03 RX ORDER — ALLOPURINOL 100 MG/1
100 TABLET ORAL DAILY
Qty: 30 TABLET | Refills: 11 | Status: SHIPPED | OUTPATIENT
Start: 2019-06-03 | End: 2020-01-01

## 2019-06-03 RX ORDER — PREDNISONE 10 MG/1
TABLET ORAL
Qty: 30 TABLET | Refills: 3 | Status: SHIPPED | OUTPATIENT
Start: 2019-06-03 | End: 2019-06-17

## 2019-06-03 RX ORDER — FINASTERIDE 5 MG/1
5 TABLET, FILM COATED ORAL DAILY
Qty: 90 TABLET | Refills: 3 | Status: SHIPPED | OUTPATIENT
Start: 2019-06-03 | End: 2020-01-01

## 2019-06-03 NOTE — PROGRESS NOTES
Bridgett Mueller is a 74year old male. HPI:   His right index finger id 90 % improved. He has OA in the joint. The acute pain is gone but rom and strength is down. No fever or chills.     He needs finasteride refilled    SR: no chest pain or sob, no gu 1 Tube Rfl: 1   ASPIRIN 81 MG Oral Tab Take 1 tablet (81 mg total) by mouth nightly. Disp: 30 tablet Rfl: 0   rivaroxaban (XARELTO) 15 MG Oral Tab Take 15 mg by mouth daily with food.  Disp:  Rfl:    Labetalol HCl 200 MG Oral Tab Take 200 mg by mouth 2 (two Packs/day: 1.00        Years: 45.00        Pack years: 39        Types: Cigarettes        Start date: 10/3/1958        Quit date: 1993        Years since quittin.4      Smokeless tobacco: Never Used    Alcohol use:  Yes      Alcohol/week: 3.6 oz

## 2019-06-07 ENCOUNTER — APPOINTMENT (OUTPATIENT)
Dept: LAB | Age: 74
End: 2019-06-07
Attending: INTERNAL MEDICINE
Payer: MEDICARE

## 2019-06-07 DIAGNOSIS — N28.9 RENAL INSUFFICIENCY: ICD-10-CM

## 2019-06-07 PROCEDURE — 36415 COLL VENOUS BLD VENIPUNCTURE: CPT

## 2019-06-07 PROCEDURE — 80069 RENAL FUNCTION PANEL: CPT

## 2019-06-17 ENCOUNTER — OFFICE VISIT (OUTPATIENT)
Dept: INTERNAL MEDICINE CLINIC | Facility: CLINIC | Age: 74
End: 2019-06-17
Payer: MEDICARE

## 2019-06-17 VITALS
BODY MASS INDEX: 26.56 KG/M2 | WEIGHT: 207 LBS | HEIGHT: 74 IN | DIASTOLIC BLOOD PRESSURE: 86 MMHG | TEMPERATURE: 98 F | SYSTOLIC BLOOD PRESSURE: 138 MMHG | HEART RATE: 52 BPM | OXYGEN SATURATION: 93 %

## 2019-06-17 DIAGNOSIS — M10.041 ACUTE IDIOPATHIC GOUT OF RIGHT HAND: ICD-10-CM

## 2019-06-17 DIAGNOSIS — N17.9 ACUTE RENAL FAILURE SUPERIMPOSED ON STAGE 3 CHRONIC KIDNEY DISEASE, UNSPECIFIED ACUTE RENAL FAILURE TYPE: Primary | ICD-10-CM

## 2019-06-17 DIAGNOSIS — I48.20 CHRONIC ATRIAL FIBRILLATION (HCC): ICD-10-CM

## 2019-06-17 DIAGNOSIS — N18.3 ACUTE RENAL FAILURE SUPERIMPOSED ON STAGE 3 CHRONIC KIDNEY DISEASE, UNSPECIFIED ACUTE RENAL FAILURE TYPE: Primary | ICD-10-CM

## 2019-06-17 DIAGNOSIS — N28.9 RENAL INSUFFICIENCY: ICD-10-CM

## 2019-06-17 DIAGNOSIS — I10 HYPERTENSION, BENIGN: ICD-10-CM

## 2019-06-17 PROCEDURE — 99214 OFFICE O/P EST MOD 30 MIN: CPT | Performed by: INTERNAL MEDICINE

## 2019-06-17 PROCEDURE — G0463 HOSPITAL OUTPT CLINIC VISIT: HCPCS | Performed by: INTERNAL MEDICINE

## 2019-06-17 RX ORDER — TAMSULOSIN HYDROCHLORIDE 0.4 MG/1
CAPSULE ORAL
Qty: 30 CAPSULE | Refills: 10 | OUTPATIENT
Start: 2019-06-17

## 2019-06-17 NOTE — TELEPHONE ENCOUNTER
Authorizing Provider Encounter Provider   MD Blanca Flores Flora Bel, MD   Medication Detail     Medication Quantity Refills Start End   finasteride 5 MG Oral Tab 90 tablet 3 6/3/2019    Sig:   Take 1 tablet (5 mg total) by mouth daily.      Rou

## 2019-06-20 ENCOUNTER — TELEPHONE (OUTPATIENT)
Dept: CARDIOLOGY | Age: 74
End: 2019-06-20

## 2019-07-02 RX ORDER — FUROSEMIDE 40 MG/1
TABLET ORAL
Qty: 90 TABLET | Refills: 2 | Status: SHIPPED | OUTPATIENT
Start: 2019-07-02 | End: 2020-03-20

## 2019-07-11 ENCOUNTER — ANCILLARY PROCEDURE (OUTPATIENT)
Dept: CARDIOLOGY | Age: 74
End: 2019-07-11
Attending: INTERNAL MEDICINE

## 2019-07-11 DIAGNOSIS — I25.10 ATHEROSCLEROSIS OF CORONARY ARTERY, ANGINA PRESENCE UNSPECIFIED, UNSPECIFIED VESSEL OR LESION TYPE, UNSPECIFIED WHETHER NATIVE OR TRANSPLANTED HEART: ICD-10-CM

## 2019-07-11 PROCEDURE — 93880 EXTRACRANIAL BILAT STUDY: CPT | Performed by: INTERNAL MEDICINE

## 2019-07-23 ENCOUNTER — OFFICE VISIT (OUTPATIENT)
Dept: INTERNAL MEDICINE CLINIC | Facility: CLINIC | Age: 74
End: 2019-07-23
Payer: MEDICARE

## 2019-07-23 ENCOUNTER — LAB ENCOUNTER (OUTPATIENT)
Dept: LAB | Age: 74
End: 2019-07-23
Attending: INTERNAL MEDICINE
Payer: MEDICARE

## 2019-07-23 VITALS
BODY MASS INDEX: 27.34 KG/M2 | WEIGHT: 213 LBS | HEART RATE: 57 BPM | DIASTOLIC BLOOD PRESSURE: 78 MMHG | SYSTOLIC BLOOD PRESSURE: 122 MMHG | HEIGHT: 74 IN | TEMPERATURE: 98 F

## 2019-07-23 DIAGNOSIS — N17.9 ACUTE RENAL FAILURE SUPERIMPOSED ON STAGE 3 CHRONIC KIDNEY DISEASE, UNSPECIFIED ACUTE RENAL FAILURE TYPE: ICD-10-CM

## 2019-07-23 DIAGNOSIS — M10.041 ACUTE IDIOPATHIC GOUT OF RIGHT HAND: Primary | ICD-10-CM

## 2019-07-23 DIAGNOSIS — N18.3 ACUTE RENAL FAILURE SUPERIMPOSED ON STAGE 3 CHRONIC KIDNEY DISEASE, UNSPECIFIED ACUTE RENAL FAILURE TYPE: ICD-10-CM

## 2019-07-23 DIAGNOSIS — I10 HYPERTENSION, BENIGN: ICD-10-CM

## 2019-07-23 LAB
ALBUMIN SERPL-MCNC: 3 G/DL (ref 3.4–5)
ALBUMIN/GLOB SERPL: 1 {RATIO} (ref 1–2)
ALP LIVER SERPL-CCNC: 87 U/L (ref 45–117)
ALT SERPL-CCNC: 20 U/L (ref 16–61)
ANION GAP SERPL CALC-SCNC: 8 MMOL/L (ref 0–18)
AST SERPL-CCNC: 15 U/L (ref 15–37)
BASOPHILS # BLD AUTO: 0.02 X10(3) UL (ref 0–0.2)
BASOPHILS NFR BLD AUTO: 0.3 %
BILIRUB SERPL-MCNC: 0.6 MG/DL (ref 0.1–2)
BUN BLD-MCNC: 47 MG/DL (ref 7–18)
BUN/CREAT SERPL: 26.4 (ref 10–20)
CALCIUM BLD-MCNC: 8.8 MG/DL (ref 8.5–10.1)
CHLORIDE SERPL-SCNC: 109 MMOL/L (ref 98–112)
CO2 SERPL-SCNC: 26 MMOL/L (ref 21–32)
CREAT BLD-MCNC: 1.78 MG/DL (ref 0.7–1.3)
DEPRECATED RDW RBC AUTO: 56.1 FL (ref 35.1–46.3)
EOSINOPHIL # BLD AUTO: 0.05 X10(3) UL (ref 0–0.7)
EOSINOPHIL NFR BLD AUTO: 0.8 %
ERYTHROCYTE [DISTWIDTH] IN BLOOD BY AUTOMATED COUNT: 15.4 % (ref 11–15)
GLOBULIN PLAS-MCNC: 3.1 G/DL (ref 2.8–4.4)
GLUCOSE BLD-MCNC: 88 MG/DL (ref 70–99)
HCT VFR BLD AUTO: 32.3 % (ref 39–53)
HGB BLD-MCNC: 10.3 G/DL (ref 13–17.5)
IMM GRANULOCYTES # BLD AUTO: 0.1 X10(3) UL (ref 0–1)
IMM GRANULOCYTES NFR BLD: 1.7 %
LYMPHOCYTES # BLD AUTO: 0.4 X10(3) UL (ref 1–4)
LYMPHOCYTES NFR BLD AUTO: 6.7 %
M PROTEIN MFR SERPL ELPH: 6.1 G/DL (ref 6.4–8.2)
MCH RBC QN AUTO: 31.7 PG (ref 26–34)
MCHC RBC AUTO-ENTMCNC: 31.9 G/DL (ref 31–37)
MCV RBC AUTO: 99.4 FL (ref 80–100)
MONOCYTES # BLD AUTO: 0.65 X10(3) UL (ref 0.1–1)
MONOCYTES NFR BLD AUTO: 11 %
NEUTROPHILS # BLD AUTO: 4.71 X10 (3) UL (ref 1.5–7.7)
NEUTROPHILS # BLD AUTO: 4.71 X10(3) UL (ref 1.5–7.7)
NEUTROPHILS NFR BLD AUTO: 79.5 %
OSMOLALITY SERPL CALC.SUM OF ELEC: 308 MOSM/KG (ref 275–295)
PATIENT FASTING: YES
PLATELET # BLD AUTO: 217 10(3)UL (ref 150–450)
POTASSIUM SERPL-SCNC: 3.9 MMOL/L (ref 3.5–5.1)
RBC # BLD AUTO: 3.25 X10(6)UL (ref 3.8–5.8)
SODIUM SERPL-SCNC: 143 MMOL/L (ref 136–145)
URATE SERPL-MCNC: 5.9 MG/DL (ref 3.5–7.2)
WBC # BLD AUTO: 5.9 X10(3) UL (ref 4–11)

## 2019-07-23 PROCEDURE — 84550 ASSAY OF BLOOD/URIC ACID: CPT

## 2019-07-23 PROCEDURE — 85025 COMPLETE CBC W/AUTO DIFF WBC: CPT

## 2019-07-23 PROCEDURE — 20600 DRAIN/INJ JOINT/BURSA W/O US: CPT | Performed by: INTERNAL MEDICINE

## 2019-07-23 PROCEDURE — 36415 COLL VENOUS BLD VENIPUNCTURE: CPT

## 2019-07-23 PROCEDURE — 99213 OFFICE O/P EST LOW 20 MIN: CPT | Performed by: INTERNAL MEDICINE

## 2019-07-23 PROCEDURE — 80053 COMPREHEN METABOLIC PANEL: CPT

## 2019-07-23 RX ORDER — TRIAMCINOLONE ACETONIDE 40 MG/ML
20 INJECTION, SUSPENSION INTRA-ARTICULAR; INTRAMUSCULAR ONCE
Status: COMPLETED | OUTPATIENT
Start: 2019-07-23 | End: 2019-07-23

## 2019-07-23 RX ADMIN — TRIAMCINOLONE ACETONIDE 20 MG: 40 INJECTION, SUSPENSION INTRA-ARTICULAR; INTRAMUSCULAR at 11:23:00

## 2019-07-23 NOTE — PROGRESS NOTES
Oriana Tatum is a 74year old male. HPI:   He had recurrence of gout right index finger on 7/11 and I gave 9 days of tapering steroids, then recurrence in that joint last week, swollen and painful but not extreme. He is on allopurinol 100 mg.      HTN B-12 (VITAMIN B12) 1000 MCG Oral Tab Take  by mouth.  take 1 by Oral route in the morning Disp:  Rfl:    Ferrous Sulfate 325 (65 FE) MG Oral Tab Take 325 mg by mouth daily with breakfast.   Disp:  Rfl:    folic acid (FOLVITE) 152 MCG Oral Tab Take  by mouth Used    Alcohol use:  Yes      Alcohol/week: 6.0 standard drinks      Types: 6 Standard drinks or equivalent per week      Comment: drinks occasionally    Drug use: No       REVIEW OF SYSTEMS:   GENERAL HEALTH: feels well otherwise  SKIN: denies any unusual

## 2019-07-29 ENCOUNTER — TELEPHONE (OUTPATIENT)
Dept: INTERNAL MEDICINE CLINIC | Facility: CLINIC | Age: 74
End: 2019-07-29

## 2019-07-29 DIAGNOSIS — M10.041 ACUTE IDIOPATHIC GOUT OF RIGHT HAND: ICD-10-CM

## 2019-07-29 DIAGNOSIS — N18.3 ACUTE RENAL FAILURE SUPERIMPOSED ON STAGE 3 CHRONIC KIDNEY DISEASE, UNSPECIFIED ACUTE RENAL FAILURE TYPE: Primary | ICD-10-CM

## 2019-07-29 DIAGNOSIS — N17.9 ACUTE RENAL FAILURE SUPERIMPOSED ON STAGE 3 CHRONIC KIDNEY DISEASE, UNSPECIFIED ACUTE RENAL FAILURE TYPE: Primary | ICD-10-CM

## 2019-07-29 NOTE — TELEPHONE ENCOUNTER
Labs much improved. Hb up from 9.4 to 10.3, creatinine down from 2.1 to 1.7, uric acid down from 10.0 to 5.9    I am very happy with these results. Repeat above in about 6 weeks.

## 2019-08-06 ENCOUNTER — OFFICE VISIT (OUTPATIENT)
Dept: CARDIOLOGY | Age: 74
End: 2019-08-06

## 2019-08-06 VITALS
BODY MASS INDEX: 26.61 KG/M2 | OXYGEN SATURATION: 98 % | HEIGHT: 75 IN | DIASTOLIC BLOOD PRESSURE: 60 MMHG | HEART RATE: 53 BPM | WEIGHT: 214 LBS | SYSTOLIC BLOOD PRESSURE: 110 MMHG

## 2019-08-06 DIAGNOSIS — I10 ESSENTIAL HYPERTENSION: ICD-10-CM

## 2019-08-06 DIAGNOSIS — I65.23 BILATERAL CAROTID ARTERY STENOSIS: ICD-10-CM

## 2019-08-06 DIAGNOSIS — I48.20 CHRONIC ATRIAL FIBRILLATION (CMD): Primary | ICD-10-CM

## 2019-08-06 DIAGNOSIS — I25.10 CORONARY ARTERY DISEASE INVOLVING NATIVE CORONARY ARTERY OF NATIVE HEART WITHOUT ANGINA PECTORIS: ICD-10-CM

## 2019-08-06 PROBLEM — E78.5 DYSLIPIDEMIA: Status: ACTIVE | Noted: 2019-08-06

## 2019-08-06 PROBLEM — I63.9 CEREBROVASCULAR ACCIDENT (CVA) (CMD): Status: ACTIVE | Noted: 2019-08-06

## 2019-08-06 PROBLEM — I48.91 ATRIAL FIBRILLATION (CMD): Status: ACTIVE | Noted: 2019-08-06

## 2019-08-06 PROCEDURE — 99214 OFFICE O/P EST MOD 30 MIN: CPT | Performed by: INTERNAL MEDICINE

## 2019-08-06 RX ORDER — TAMSULOSIN HYDROCHLORIDE 0.4 MG/1
CAPSULE ORAL
Qty: 30 CAPSULE | Refills: 10 | OUTPATIENT
Start: 2019-08-06

## 2019-08-06 NOTE — TELEPHONE ENCOUNTER
Pt LOV with Dr. Codie Estes 1/23/18 pt does not have upcoming appointments and pharmacy is requesting refill for tamsulosin. Refill denied pt needs to make haile.

## 2019-08-08 RX ORDER — TAMSULOSIN HYDROCHLORIDE 0.4 MG/1
0.4 CAPSULE ORAL DAILY
Qty: 30 CAPSULE | Refills: 11 | Status: SHIPPED | OUTPATIENT
Start: 2019-08-08 | End: 2020-01-01

## 2019-08-12 ENCOUNTER — OFFICE VISIT (OUTPATIENT)
Dept: INTERNAL MEDICINE CLINIC | Facility: CLINIC | Age: 74
End: 2019-08-12
Payer: MEDICARE

## 2019-08-12 VITALS
BODY MASS INDEX: 27 KG/M2 | SYSTOLIC BLOOD PRESSURE: 140 MMHG | WEIGHT: 212 LBS | DIASTOLIC BLOOD PRESSURE: 72 MMHG | HEART RATE: 54 BPM | OXYGEN SATURATION: 97 % | RESPIRATION RATE: 12 BRPM | TEMPERATURE: 99 F

## 2019-08-12 DIAGNOSIS — M10.041 ACUTE IDIOPATHIC GOUT OF RIGHT HAND: Primary | ICD-10-CM

## 2019-08-12 DIAGNOSIS — E78.5 HYPERLIPIDEMIA, UNSPECIFIED HYPERLIPIDEMIA TYPE: ICD-10-CM

## 2019-08-12 DIAGNOSIS — I25.9 CHRONIC ISCHEMIC HEART DISEASE: ICD-10-CM

## 2019-08-12 DIAGNOSIS — I10 HYPERTENSION, BENIGN: ICD-10-CM

## 2019-08-12 PROCEDURE — G0463 HOSPITAL OUTPT CLINIC VISIT: HCPCS | Performed by: INTERNAL MEDICINE

## 2019-08-12 PROCEDURE — 99214 OFFICE O/P EST MOD 30 MIN: CPT | Performed by: INTERNAL MEDICINE

## 2019-08-12 NOTE — PROGRESS NOTES
Padilla Delgado is a 74year old male. HPI:   He is doing well and has no major complaints today other than a heaviness and aching in the legs which he thinks may be due to simvastatin.   His right index finger is markedly improved after steroid injection i mouth nightly. Disp: 30 tablet Rfl: 0   rivaroxaban (XARELTO) 15 MG Oral Tab Take 15 mg by mouth daily with food. Disp:  Rfl:    Labetalol HCl 200 MG Oral Tab Take 200 mg by mouth 2 (two) times daily.  Disp:  Rfl:    Vitamin B-12 (VITAMIN B12) 1000 MCG Oral Cigarettes        Start date: 10/3/1958        Quit date: 1993        Years since quittin.6      Smokeless tobacco: Never Used    Alcohol use:  Yes      Alcohol/week: 6.0 standard drinks      Types: 6 Standard drinks or equivalent per week      Com

## 2019-09-25 ENCOUNTER — LAB ENCOUNTER (OUTPATIENT)
Dept: LAB | Age: 74
End: 2019-09-25
Attending: INTERNAL MEDICINE
Payer: MEDICARE

## 2019-09-25 DIAGNOSIS — E78.5 HYPERLIPIDEMIA, UNSPECIFIED HYPERLIPIDEMIA TYPE: ICD-10-CM

## 2019-09-25 DIAGNOSIS — M10.041 ACUTE IDIOPATHIC GOUT OF RIGHT HAND: ICD-10-CM

## 2019-09-25 DIAGNOSIS — N18.3 ACUTE RENAL FAILURE SUPERIMPOSED ON STAGE 3 CHRONIC KIDNEY DISEASE, UNSPECIFIED ACUTE RENAL FAILURE TYPE: ICD-10-CM

## 2019-09-25 DIAGNOSIS — Z12.5 SCREENING FOR PROSTATE CANCER: Primary | ICD-10-CM

## 2019-09-25 DIAGNOSIS — N17.9 ACUTE RENAL FAILURE SUPERIMPOSED ON STAGE 3 CHRONIC KIDNEY DISEASE, UNSPECIFIED ACUTE RENAL FAILURE TYPE: ICD-10-CM

## 2019-09-25 LAB
ALBUMIN SERPL-MCNC: 3 G/DL (ref 3.4–5)
ALBUMIN/GLOB SERPL: 0.9 {RATIO} (ref 1–2)
ALP LIVER SERPL-CCNC: 82 U/L (ref 45–117)
ALT SERPL-CCNC: 18 U/L (ref 16–61)
ANION GAP SERPL CALC-SCNC: 8 MMOL/L (ref 0–18)
AST SERPL-CCNC: 15 U/L (ref 15–37)
BASOPHILS # BLD AUTO: 0.01 X10(3) UL (ref 0–0.2)
BASOPHILS NFR BLD AUTO: 0.1 %
BILIRUB SERPL-MCNC: 0.4 MG/DL (ref 0.1–2)
BUN BLD-MCNC: 45 MG/DL (ref 7–18)
BUN/CREAT SERPL: 26.3 (ref 10–20)
CALCIUM BLD-MCNC: 9.6 MG/DL (ref 8.5–10.1)
CHLORIDE SERPL-SCNC: 109 MMOL/L (ref 98–112)
CHOLEST SMN-MCNC: 156 MG/DL (ref ?–200)
CK SERPL-CCNC: 45 U/L (ref 39–308)
CO2 SERPL-SCNC: 27 MMOL/L (ref 21–32)
COMPLEXED PSA SERPL-MCNC: 0.53 NG/ML (ref ?–4)
CREAT BLD-MCNC: 1.71 MG/DL (ref 0.7–1.3)
DEPRECATED RDW RBC AUTO: 53.1 FL (ref 35.1–46.3)
EOSINOPHIL # BLD AUTO: 0 X10(3) UL (ref 0–0.7)
EOSINOPHIL NFR BLD AUTO: 0 %
ERYTHROCYTE [DISTWIDTH] IN BLOOD BY AUTOMATED COUNT: 14.4 % (ref 11–15)
GLOBULIN PLAS-MCNC: 3.3 G/DL (ref 2.8–4.4)
GLUCOSE BLD-MCNC: 114 MG/DL (ref 70–99)
HCT VFR BLD AUTO: 31.2 % (ref 39–53)
HDLC SERPL-MCNC: 88 MG/DL (ref 40–59)
HGB BLD-MCNC: 9.9 G/DL (ref 13–17.5)
IMM GRANULOCYTES # BLD AUTO: 0.14 X10(3) UL (ref 0–1)
IMM GRANULOCYTES NFR BLD: 1.6 %
LDLC SERPL CALC-MCNC: 57 MG/DL (ref ?–100)
LYMPHOCYTES # BLD AUTO: 0.29 X10(3) UL (ref 1–4)
LYMPHOCYTES NFR BLD AUTO: 3.4 %
M PROTEIN MFR SERPL ELPH: 6.3 G/DL (ref 6.4–8.2)
MCH RBC QN AUTO: 32 PG (ref 26–34)
MCHC RBC AUTO-ENTMCNC: 31.7 G/DL (ref 31–37)
MCV RBC AUTO: 101 FL (ref 80–100)
MONOCYTES # BLD AUTO: 0.28 X10(3) UL (ref 0.1–1)
MONOCYTES NFR BLD AUTO: 3.3 %
NEUTROPHILS # BLD AUTO: 7.89 X10 (3) UL (ref 1.5–7.7)
NEUTROPHILS # BLD AUTO: 7.89 X10(3) UL (ref 1.5–7.7)
NEUTROPHILS NFR BLD AUTO: 91.6 %
NONHDLC SERPL-MCNC: 68 MG/DL (ref ?–130)
OSMOLALITY SERPL CALC.SUM OF ELEC: 310 MOSM/KG (ref 275–295)
PATIENT FASTING: YES
PATIENT FASTING: YES
PLATELET # BLD AUTO: 326 10(3)UL (ref 150–450)
POTASSIUM SERPL-SCNC: 4.4 MMOL/L (ref 3.5–5.1)
RBC # BLD AUTO: 3.09 X10(6)UL (ref 3.8–5.8)
SODIUM SERPL-SCNC: 144 MMOL/L (ref 136–145)
TRIGL SERPL-MCNC: 56 MG/DL (ref 30–149)
URATE SERPL-MCNC: 5.9 MG/DL (ref 3.5–7.2)
VLDLC SERPL CALC-MCNC: 11 MG/DL (ref 0–30)
WBC # BLD AUTO: 8.6 X10(3) UL (ref 4–11)

## 2019-09-25 PROCEDURE — 80053 COMPREHEN METABOLIC PANEL: CPT

## 2019-09-25 PROCEDURE — 84550 ASSAY OF BLOOD/URIC ACID: CPT

## 2019-09-25 PROCEDURE — 36415 COLL VENOUS BLD VENIPUNCTURE: CPT

## 2019-09-25 PROCEDURE — 80061 LIPID PANEL: CPT

## 2019-09-25 PROCEDURE — 85025 COMPLETE CBC W/AUTO DIFF WBC: CPT

## 2019-09-25 PROCEDURE — 82550 ASSAY OF CK (CPK): CPT

## 2019-09-26 ENCOUNTER — TELEPHONE (OUTPATIENT)
Dept: INTERNAL MEDICINE CLINIC | Facility: CLINIC | Age: 74
End: 2019-09-26

## 2019-10-15 ENCOUNTER — OFFICE VISIT (OUTPATIENT)
Dept: INTERNAL MEDICINE CLINIC | Facility: CLINIC | Age: 74
End: 2019-10-15
Payer: MEDICARE

## 2019-10-15 VITALS
BODY MASS INDEX: 28.11 KG/M2 | TEMPERATURE: 98 F | SYSTOLIC BLOOD PRESSURE: 102 MMHG | OXYGEN SATURATION: 98 % | DIASTOLIC BLOOD PRESSURE: 60 MMHG | WEIGHT: 219 LBS | HEIGHT: 74 IN | HEART RATE: 63 BPM

## 2019-10-15 DIAGNOSIS — N28.9 RENAL INSUFFICIENCY: ICD-10-CM

## 2019-10-15 DIAGNOSIS — D50.9 IRON DEFICIENCY ANEMIA, UNSPECIFIED IRON DEFICIENCY ANEMIA TYPE: ICD-10-CM

## 2019-10-15 DIAGNOSIS — I10 HYPERTENSION, BENIGN: ICD-10-CM

## 2019-10-15 DIAGNOSIS — R60.9 EDEMA, UNSPECIFIED TYPE: Primary | ICD-10-CM

## 2019-10-15 DIAGNOSIS — I25.9 CHRONIC ISCHEMIC HEART DISEASE: ICD-10-CM

## 2019-10-15 PROCEDURE — G0463 HOSPITAL OUTPT CLINIC VISIT: HCPCS | Performed by: INTERNAL MEDICINE

## 2019-10-15 PROCEDURE — 99214 OFFICE O/P EST MOD 30 MIN: CPT | Performed by: INTERNAL MEDICINE

## 2019-10-15 RX ORDER — TORSEMIDE 20 MG/1
20 TABLET ORAL DAILY
Qty: 30 TABLET | Refills: 5 | Status: SHIPPED | OUTPATIENT
Start: 2019-10-15 | End: 2019-11-05 | Stop reason: ALTCHOICE

## 2019-10-15 NOTE — PROGRESS NOTES
Oriana Tatum is a 74year old male. HPI:   He feels he is retaining fluid. No chest pain or sob. His weight is up 7 pounds.     He has had increasing peripheral edema    Renal insufficiency - creatinine 1.7       SR: no chest pain or sob, no gu or gi sx Rfl:   Ferrous Sulfate 325 (65 FE) MG Oral Tab, Take 325 mg by mouth daily with breakfast.  , Disp: , Rfl:   folic acid (FOLVITE) 745 MCG Oral Tab, Take  by mouth.  take 1 tablet (0.4MG)  by oral route  every day, Disp: , Rfl:        Past Medical History: GENERAL HEALTH: feels well otherwise  SKIN: denies any unusual skin lesions or rashes  RESPIRATORY: denies shortness of breath with exertion  CARDIOVASCULAR: denies chest pain on exertion  GI: denies abdominal pain and denies heartburn  NEURO: denies hea

## 2019-10-31 ENCOUNTER — LAB ENCOUNTER (OUTPATIENT)
Dept: LAB | Age: 74
End: 2019-10-31
Attending: INTERNAL MEDICINE
Payer: MEDICARE

## 2019-10-31 DIAGNOSIS — N28.9 RENAL INSUFFICIENCY: ICD-10-CM

## 2019-10-31 DIAGNOSIS — I10 HYPERTENSION, BENIGN: ICD-10-CM

## 2019-10-31 DIAGNOSIS — D50.9 IRON DEFICIENCY ANEMIA, UNSPECIFIED IRON DEFICIENCY ANEMIA TYPE: ICD-10-CM

## 2019-10-31 PROCEDURE — 80048 BASIC METABOLIC PNL TOTAL CA: CPT

## 2019-10-31 PROCEDURE — 85025 COMPLETE CBC W/AUTO DIFF WBC: CPT

## 2019-10-31 PROCEDURE — 36415 COLL VENOUS BLD VENIPUNCTURE: CPT

## 2019-11-04 ENCOUNTER — TELEPHONE (OUTPATIENT)
Dept: INTERNAL MEDICINE CLINIC | Facility: CLINIC | Age: 74
End: 2019-11-04

## 2019-11-04 DIAGNOSIS — D64.9 ANEMIA, UNSPECIFIED TYPE: ICD-10-CM

## 2019-11-04 DIAGNOSIS — N28.9 RENAL INSUFFICIENCY: Primary | ICD-10-CM

## 2019-11-04 NOTE — TELEPHONE ENCOUNTER
S/w patient and relayed MDs message-verbalized understanding. Instructed to take Torsemide 20mg every other day. Orders entered for CBC and BMP in 2-3 weeks.    Med module updated

## 2019-11-04 NOTE — TELEPHONE ENCOUNTER
Tell  Him recent labs - kidneys a lttle dry from diuretic - decrease torsemide to 20 mg every other day. Blood count stable - mild- mod anemia. Repeat CBC, BMP 2-3 weeks.

## 2019-11-05 ENCOUNTER — OFFICE VISIT (OUTPATIENT)
Dept: INTERNAL MEDICINE CLINIC | Facility: CLINIC | Age: 74
End: 2019-11-05
Payer: MEDICARE

## 2019-11-05 VITALS
SYSTOLIC BLOOD PRESSURE: 144 MMHG | DIASTOLIC BLOOD PRESSURE: 76 MMHG | BODY MASS INDEX: 27.85 KG/M2 | HEART RATE: 65 BPM | TEMPERATURE: 98 F | OXYGEN SATURATION: 98 % | RESPIRATION RATE: 16 BRPM | WEIGHT: 217 LBS | HEIGHT: 74 IN

## 2019-11-05 DIAGNOSIS — N28.9 RENAL INSUFFICIENCY: Primary | ICD-10-CM

## 2019-11-05 DIAGNOSIS — I10 HYPERTENSION, BENIGN: ICD-10-CM

## 2019-11-05 DIAGNOSIS — I48.20 CHRONIC ATRIAL FIBRILLATION (HCC): ICD-10-CM

## 2019-11-05 DIAGNOSIS — D63.8 ANEMIA IN OTHER CHRONIC DISEASES CLASSIFIED ELSEWHERE: ICD-10-CM

## 2019-11-05 PROCEDURE — 99214 OFFICE O/P EST MOD 30 MIN: CPT | Performed by: INTERNAL MEDICINE

## 2019-11-05 PROCEDURE — G0463 HOSPITAL OUTPT CLINIC VISIT: HCPCS | Performed by: INTERNAL MEDICINE

## 2019-11-05 NOTE — PROGRESS NOTES
Malia Rivera is a 74year old male. HPI:   Weight about the same. Torsemide has not helped leg edema. Creatinine up from 1.7 to 2.4    Hb steady at 9.6    But , he feels well and no chest pain or sob. Appetite good.  Legs feel weak and edema continue Oral route in the morning     • Ferrous Sulfate 325 (65 FE) MG Oral Tab Take 325 mg by mouth daily with breakfast.       • folic acid (FOLVITE) 313 MCG Oral Tab Take  by mouth.  take 1 tablet (0.4MG)  by oral route  every day        Past Medical History: GENERAL HEALTH: feels well otherwise  SKIN: denies any unusual skin lesions or rashes  RESPIRATORY: denies shortness of breath with exertion  CARDIOVASCULAR: denies chest pain on exertion  GI: denies abdominal pain and denies heartburn  NEURO: denies hea

## 2019-11-06 RX ORDER — LABETALOL 200 MG/1
TABLET, FILM COATED ORAL
Qty: 60 TABLET | Refills: 4 | Status: SHIPPED | OUTPATIENT
Start: 2019-11-06 | End: 2020-03-20

## 2019-11-22 ENCOUNTER — LAB ENCOUNTER (OUTPATIENT)
Dept: LAB | Age: 74
End: 2019-11-22
Attending: INTERNAL MEDICINE
Payer: MEDICARE

## 2019-11-22 DIAGNOSIS — N28.9 RENAL INSUFFICIENCY: ICD-10-CM

## 2019-11-22 DIAGNOSIS — D64.9 ANEMIA, UNSPECIFIED TYPE: ICD-10-CM

## 2019-11-22 PROCEDURE — 36415 COLL VENOUS BLD VENIPUNCTURE: CPT

## 2019-11-22 PROCEDURE — 80053 COMPREHEN METABOLIC PANEL: CPT

## 2019-11-22 PROCEDURE — 85025 COMPLETE CBC W/AUTO DIFF WBC: CPT

## 2019-11-22 PROCEDURE — 83735 ASSAY OF MAGNESIUM: CPT

## 2019-11-29 ENCOUNTER — APPOINTMENT (OUTPATIENT)
Dept: LAB | Age: 74
End: 2019-11-29
Attending: INTERNAL MEDICINE
Payer: MEDICARE

## 2019-11-29 DIAGNOSIS — N18.3 ACUTE RENAL FAILURE SUPERIMPOSED ON STAGE 3 CHRONIC KIDNEY DISEASE, UNSPECIFIED ACUTE RENAL FAILURE TYPE: ICD-10-CM

## 2019-11-29 DIAGNOSIS — N17.9 ACUTE RENAL FAILURE SUPERIMPOSED ON STAGE 3 CHRONIC KIDNEY DISEASE, UNSPECIFIED ACUTE RENAL FAILURE TYPE: ICD-10-CM

## 2019-11-29 PROCEDURE — 85018 HEMOGLOBIN: CPT

## 2019-11-29 PROCEDURE — 36415 COLL VENOUS BLD VENIPUNCTURE: CPT

## 2019-11-29 PROCEDURE — 80069 RENAL FUNCTION PANEL: CPT

## 2019-11-29 PROCEDURE — 85014 HEMATOCRIT: CPT

## 2019-12-01 ENCOUNTER — TELEPHONE (OUTPATIENT)
Dept: INTERNAL MEDICINE CLINIC | Facility: CLINIC | Age: 74
End: 2019-12-01

## 2019-12-01 DIAGNOSIS — E78.00 HYPERCHOLESTEREMIA: Primary | ICD-10-CM

## 2019-12-09 ENCOUNTER — TELEPHONE (OUTPATIENT)
Dept: INTERNAL MEDICINE CLINIC | Facility: CLINIC | Age: 74
End: 2019-12-09

## 2019-12-09 RX ORDER — MAGNESIUM 200 MG
400 TABLET ORAL DAILY
COMMUNITY
End: 2020-04-20

## 2019-12-09 NOTE — TELEPHONE ENCOUNTER
Spoke with Karely Elliott and relayed message below. He reports starting Magnesium 200mg \"a couple days ago\"; Advised 400mg per Dr. Nathalia Crystal message below. He states he will see Dr. Alice Wing next week for a follow up and repeat labs next month as advised.

## 2019-12-09 NOTE — TELEPHONE ENCOUNTER
His magnesium was a little low on the last labs. Is he taking any supplemental magnesium?   If not take 400 mg daily and I do have a magnesium level ordered for repeat labs next month

## 2019-12-10 ENCOUNTER — TELEPHONE (OUTPATIENT)
Dept: INTERNAL MEDICINE CLINIC | Facility: CLINIC | Age: 74
End: 2019-12-10

## 2019-12-10 RX ORDER — PREDNISONE 10 MG/1
TABLET ORAL
Qty: 30 TABLET | Refills: 2 | Status: SHIPPED | OUTPATIENT
Start: 2019-12-10 | End: 2020-01-01 | Stop reason: ALTCHOICE

## 2019-12-10 NOTE — TELEPHONE ENCOUNTER
Spoke to patient who reports he got a gout flare up in his right finger on Friday. He had left over Prednisone so he took 3 tabs on Friday, 3 on Saturday, 3 on Sunday and then 2 yesterday and he ran out today.  He states the pain is gone but he needs a refi

## 2019-12-10 NOTE — TELEPHONE ENCOUNTER
[facility] requesting per pt a medication for gout. Pt stated to the Pharmacist he like a refill for his gout medication he is having a flair up.

## 2019-12-10 NOTE — TELEPHONE ENCOUNTER
Dr. Gayathri Castañeda message relayed to pt who verbalized understanding. Rx [facility]Rx'd to [facility].

## 2019-12-13 ENCOUNTER — TELEPHONE (OUTPATIENT)
Dept: GASTROENTEROLOGY | Facility: CLINIC | Age: 74
End: 2019-12-13

## 2019-12-13 NOTE — TELEPHONE ENCOUNTER
----- Message from Razia Doty RN sent at 1/18/2017  8:42 AM CST -----  Regarding: EGD recall  3 year EGD recall, per Dr. Endy Garcia; EGD done 1/13/17.

## 2019-12-17 ENCOUNTER — TELEPHONE (OUTPATIENT)
Dept: INTERNAL MEDICINE CLINIC | Facility: CLINIC | Age: 74
End: 2019-12-17

## 2019-12-17 ENCOUNTER — OFFICE VISIT (OUTPATIENT)
Dept: INTERNAL MEDICINE CLINIC | Facility: CLINIC | Age: 74
End: 2019-12-17
Payer: MEDICARE

## 2019-12-17 VITALS
SYSTOLIC BLOOD PRESSURE: 140 MMHG | OXYGEN SATURATION: 97 % | BODY MASS INDEX: 28.26 KG/M2 | DIASTOLIC BLOOD PRESSURE: 72 MMHG | HEIGHT: 74 IN | WEIGHT: 220.19 LBS | HEART RATE: 50 BPM | TEMPERATURE: 98 F

## 2019-12-17 DIAGNOSIS — I10 HYPERTENSION, BENIGN: ICD-10-CM

## 2019-12-17 DIAGNOSIS — M10.041 ACUTE IDIOPATHIC GOUT OF RIGHT HAND: Primary | ICD-10-CM

## 2019-12-17 DIAGNOSIS — E78.5 HYPERLIPIDEMIA, UNSPECIFIED HYPERLIPIDEMIA TYPE: ICD-10-CM

## 2019-12-17 DIAGNOSIS — I48.20 CHRONIC ATRIAL FIBRILLATION (HCC): ICD-10-CM

## 2019-12-17 PROCEDURE — 99214 OFFICE O/P EST MOD 30 MIN: CPT | Performed by: INTERNAL MEDICINE

## 2019-12-17 PROCEDURE — G0463 HOSPITAL OUTPT CLINIC VISIT: HCPCS | Performed by: INTERNAL MEDICINE

## 2019-12-17 RX ORDER — PREDNISONE 10 MG/1
TABLET ORAL
Qty: 40 TABLET | Refills: 3 | Status: SHIPPED | OUTPATIENT
Start: 2019-12-17 | End: 2020-01-01 | Stop reason: ALTCHOICE

## 2019-12-17 NOTE — PROGRESS NOTES
Nadeem Ramirez is a 66year old male. HPI:   He has been doing well. He had gout of the right middle digit, the PIP joint and started prednisone immediately with resolution of symptoms within 24 hours.   This is his third attack of gout within the last MG Oral Tab Take 1 tablet (81 mg total) by mouth nightly. 30 tablet 0   • rivaroxaban (XARELTO) 15 MG Oral Tab Take 15 mg by mouth daily with food. • Labetalol HCl 200 MG Oral Tab Take 200 mg by mouth 2 (two) times daily.      • Vitamin B-12 (VITAMIN B1 History:  Social History    Tobacco Use      Smoking status: Former Smoker        Packs/day: 1.00        Years: 45.00        Pack years: 39        Types: Cigarettes        Start date: 10/3/1958        Quit date: 1993        Years since quittin.9

## 2019-12-17 NOTE — TELEPHONE ENCOUNTER
Cullen from [address] [place] [address] calling to clarify pt's new Rx for Prednisone 10 mg \"take as directed\" that was prescribed today. Pharmacy asking for more specific directions as to how often patient should be taking this medication. Dr. Manuel Marmolejo, please advise.  Jose Munoz

## 2019-12-23 ENCOUNTER — LAB ENCOUNTER (OUTPATIENT)
Dept: LAB | Age: 74
End: 2019-12-23
Attending: INTERNAL MEDICINE
Payer: MEDICARE

## 2019-12-23 DIAGNOSIS — M10.041 ACUTE IDIOPATHIC GOUT OF RIGHT HAND: ICD-10-CM

## 2019-12-23 DIAGNOSIS — E78.5 HYPERLIPIDEMIA, UNSPECIFIED HYPERLIPIDEMIA TYPE: ICD-10-CM

## 2019-12-23 DIAGNOSIS — E78.00 HYPERCHOLESTEREMIA: ICD-10-CM

## 2019-12-23 PROCEDURE — 84443 ASSAY THYROID STIM HORMONE: CPT

## 2019-12-23 PROCEDURE — 83735 ASSAY OF MAGNESIUM: CPT

## 2019-12-23 PROCEDURE — 80053 COMPREHEN METABOLIC PANEL: CPT

## 2019-12-23 PROCEDURE — 84550 ASSAY OF BLOOD/URIC ACID: CPT

## 2019-12-23 PROCEDURE — 80061 LIPID PANEL: CPT

## 2019-12-23 PROCEDURE — 36415 COLL VENOUS BLD VENIPUNCTURE: CPT

## 2019-12-23 PROCEDURE — 85025 COMPLETE CBC W/AUTO DIFF WBC: CPT

## 2019-12-30 ENCOUNTER — TELEPHONE (OUTPATIENT)
Dept: GASTROENTEROLOGY | Facility: CLINIC | Age: 74
End: 2019-12-30

## 2019-12-30 NOTE — TELEPHONE ENCOUNTER
----- Message from Dino Hemphill RN sent at 9/18/2015  3:19 PM CDT -----  Regarding: Recall Colon  Recall colon in 5 years per GS.  Colon done 1/30/15

## 2020-01-01 ENCOUNTER — LAB ENCOUNTER (OUTPATIENT)
Dept: LAB | Age: 75
End: 2020-01-01
Attending: INTERNAL MEDICINE
Payer: MEDICARE

## 2020-01-01 ENCOUNTER — APPOINTMENT (OUTPATIENT)
Dept: CARDIOLOGY | Age: 75
End: 2020-01-01
Attending: INTERNAL MEDICINE

## 2020-01-01 ENCOUNTER — HOSPITAL ENCOUNTER (OUTPATIENT)
Dept: ULTRASOUND IMAGING | Facility: HOSPITAL | Age: 75
Discharge: HOME | End: 2020-01-01
Attending: INTERNAL MEDICINE
Payer: MEDICARE

## 2020-01-01 ENCOUNTER — TELEPHONE (OUTPATIENT)
Dept: INTERNAL MEDICINE CLINIC | Facility: CLINIC | Age: 75
End: 2020-01-01

## 2020-01-01 ENCOUNTER — OFFICE VISIT (OUTPATIENT)
Dept: CARDIOLOGY | Age: 75
End: 2020-01-01

## 2020-01-01 ENCOUNTER — OFFICE VISIT (OUTPATIENT)
Dept: INTERNAL MEDICINE CLINIC | Facility: CLINIC | Age: 75
End: 2020-01-01
Payer: MEDICARE

## 2020-01-01 ENCOUNTER — MED REC SCAN ONLY (OUTPATIENT)
Dept: INTERNAL MEDICINE CLINIC | Facility: CLINIC | Age: 75
End: 2020-01-01

## 2020-01-01 VITALS
HEIGHT: 74 IN | HEART RATE: 55 BPM | WEIGHT: 208 LBS | BODY MASS INDEX: 26.69 KG/M2 | TEMPERATURE: 98 F | OXYGEN SATURATION: 96 % | SYSTOLIC BLOOD PRESSURE: 110 MMHG | DIASTOLIC BLOOD PRESSURE: 62 MMHG

## 2020-01-01 VITALS
HEIGHT: 74 IN | TEMPERATURE: 98 F | OXYGEN SATURATION: 98 % | BODY MASS INDEX: 26.82 KG/M2 | WEIGHT: 209 LBS | SYSTOLIC BLOOD PRESSURE: 140 MMHG | HEART RATE: 50 BPM | DIASTOLIC BLOOD PRESSURE: 68 MMHG

## 2020-01-01 VITALS
SYSTOLIC BLOOD PRESSURE: 122 MMHG | DIASTOLIC BLOOD PRESSURE: 76 MMHG | TEMPERATURE: 97 F | WEIGHT: 211 LBS | OXYGEN SATURATION: 100 % | HEART RATE: 91 BPM | BODY MASS INDEX: 27 KG/M2

## 2020-01-01 VITALS
BODY MASS INDEX: 25.99 KG/M2 | WEIGHT: 209 LBS | DIASTOLIC BLOOD PRESSURE: 70 MMHG | HEIGHT: 75 IN | HEART RATE: 38 BPM | SYSTOLIC BLOOD PRESSURE: 150 MMHG | OXYGEN SATURATION: 94 %

## 2020-01-01 DIAGNOSIS — I65.23 BILATERAL CAROTID ARTERY STENOSIS: ICD-10-CM

## 2020-01-01 DIAGNOSIS — R06.00 DYSPNEA, UNSPECIFIED TYPE: ICD-10-CM

## 2020-01-01 DIAGNOSIS — N18.9 ANEMIA DUE TO CHRONIC KIDNEY DISEASE, UNSPECIFIED CKD STAGE: ICD-10-CM

## 2020-01-01 DIAGNOSIS — E72.11 HYPERHOMOCYSTEINEMIA (HCC): ICD-10-CM

## 2020-01-01 DIAGNOSIS — N17.9 ACUTE RENAL FAILURE SUPERIMPOSED ON STAGE 3 CHRONIC KIDNEY DISEASE (HCC): ICD-10-CM

## 2020-01-01 DIAGNOSIS — I48.21 PERMANENT ATRIAL FIBRILLATION (CMD): ICD-10-CM

## 2020-01-01 DIAGNOSIS — I10 ESSENTIAL HYPERTENSION: ICD-10-CM

## 2020-01-01 DIAGNOSIS — E78.00 HYPERCHOLESTEREMIA: ICD-10-CM

## 2020-01-01 DIAGNOSIS — N28.9 RENAL INSUFFICIENCY: ICD-10-CM

## 2020-01-01 DIAGNOSIS — N18.4 CHRONIC KIDNEY DISEASE, STAGE IV (SEVERE) (HCC): ICD-10-CM

## 2020-01-01 DIAGNOSIS — D63.8 ANEMIA IN OTHER CHRONIC DISEASES CLASSIFIED ELSEWHERE: ICD-10-CM

## 2020-01-01 DIAGNOSIS — N18.30 ACUTE RENAL FAILURE SUPERIMPOSED ON STAGE 3 CHRONIC KIDNEY DISEASE (HCC): ICD-10-CM

## 2020-01-01 DIAGNOSIS — I48.20 CHRONIC ATRIAL FIBRILLATION (HCC): ICD-10-CM

## 2020-01-01 DIAGNOSIS — R60.9 EDEMA, UNSPECIFIED TYPE: ICD-10-CM

## 2020-01-01 DIAGNOSIS — E78.5 DYSLIPIDEMIA: ICD-10-CM

## 2020-01-01 DIAGNOSIS — I10 HYPERTENSION, BENIGN: ICD-10-CM

## 2020-01-01 DIAGNOSIS — E78.5 HYPERLIPIDEMIA, UNSPECIFIED HYPERLIPIDEMIA TYPE: ICD-10-CM

## 2020-01-01 DIAGNOSIS — E78.00 HYPERCHOLESTEREMIA: Primary | ICD-10-CM

## 2020-01-01 DIAGNOSIS — I95.9 HYPOTENSION, UNSPECIFIED HYPOTENSION TYPE: ICD-10-CM

## 2020-01-01 DIAGNOSIS — E61.1 IRON DEFICIENCY: ICD-10-CM

## 2020-01-01 DIAGNOSIS — N18.3 ACUTE RENAL FAILURE SUPERIMPOSED ON STAGE 3 CHRONIC KIDNEY DISEASE, UNSPECIFIED ACUTE RENAL FAILURE TYPE: ICD-10-CM

## 2020-01-01 DIAGNOSIS — I50.9 HEART FAILURE, UNSPECIFIED HF CHRONICITY, UNSPECIFIED HEART FAILURE TYPE (HCC): ICD-10-CM

## 2020-01-01 DIAGNOSIS — I10 HYPERTENSION, BENIGN: Primary | ICD-10-CM

## 2020-01-01 DIAGNOSIS — D63.1 ANEMIA DUE TO CHRONIC KIDNEY DISEASE, UNSPECIFIED CKD STAGE: ICD-10-CM

## 2020-01-01 DIAGNOSIS — K22.70 BARRETT'S ESOPHAGUS WITHOUT DYSPLASIA: ICD-10-CM

## 2020-01-01 DIAGNOSIS — D64.9 ANEMIA, UNSPECIFIED TYPE: ICD-10-CM

## 2020-01-01 DIAGNOSIS — I25.9 CHRONIC ISCHEMIC HEART DISEASE: ICD-10-CM

## 2020-01-01 DIAGNOSIS — I25.10 CORONARY ARTERY DISEASE INVOLVING NATIVE CORONARY ARTERY OF NATIVE HEART WITHOUT ANGINA PECTORIS: Primary | ICD-10-CM

## 2020-01-01 DIAGNOSIS — N17.9 ACUTE RENAL FAILURE SUPERIMPOSED ON STAGE 3 CHRONIC KIDNEY DISEASE, UNSPECIFIED ACUTE RENAL FAILURE TYPE: ICD-10-CM

## 2020-01-01 DIAGNOSIS — I25.9 CHRONIC ISCHEMIC HEART DISEASE: Primary | ICD-10-CM

## 2020-01-01 LAB
ALBUMIN SERPL-MCNC: 3.2 G/DL
ALBUMIN SERPL-MCNC: 3.2 G/DL (ref 3.4–5)
ALBUMIN/GLOB SERPL: 1.1 {RATIO}
ALBUMIN/GLOB SERPL: 1.1 {RATIO} (ref 1–2)
ALP LIVER SERPL-CCNC: 105 U/L (ref 45–117)
ALP SERPL-CCNC: 105 U/L
ALT SERPL-CCNC: 29 U/L (ref 16–61)
ALT SERPL-CCNC: 29 UNITS/L
ANION GAP SERPL CALC-SCNC: 6 MMOL/L
ANION GAP SERPL CALC-SCNC: 6 MMOL/L (ref 0–18)
ANION GAP SERPL CALC-SCNC: 9 MMOL/L
AST SERPL-CCNC: 15 U/L (ref 15–37)
AST SERPL-CCNC: 15 UNITS/L
BASOPHILS # BLD AUTO: 0.01 X10(3) UL (ref 0–0.2)
BASOPHILS NFR BLD AUTO: 0.1 %
BILIRUB SERPL-MCNC: 0.7 MG/DL
BILIRUB SERPL-MCNC: 0.7 MG/DL (ref 0.1–2)
BUN BLD-MCNC: 60 MG/DL (ref 7–18)
BUN SERPL-MCNC: 60 MG/DL
BUN SERPL-MCNC: 60 MG/DL
BUN/CREAT SERPL: 29.6
BUN/CREAT SERPL: 31.6
BUN/CREAT SERPL: 31.6 (ref 10–20)
CALCIUM BLD-MCNC: 8.6 MG/DL (ref 8.5–10.1)
CALCIUM SERPL-MCNC: 8.6 MG/DL
CALCIUM SERPL-MCNC: 8.9 MG/DL
CHLORIDE SERPL-SCNC: 108 MMOL/L
CHLORIDE SERPL-SCNC: 108 MMOL/L (ref 98–112)
CHLORIDE SERPL-SCNC: 109 MMOL/L
CHOLEST SERPL-MCNC: 128 MG/DL
CO2 SERPL-SCNC: 25 MMOL/L
CO2 SERPL-SCNC: 29 MMOL/L
CO2 SERPL-SCNC: 29 MMOL/L (ref 21–32)
CREAT BLD-MCNC: 1.9 MG/DL (ref 0.7–1.3)
CREAT SERPL-MCNC: 1.9 MG/DL
CREAT SERPL-MCNC: 2.03 MG/DL
DEPRECATED RDW RBC AUTO: 48.1 FL (ref 35.1–46.3)
EOSINOPHIL # BLD AUTO: 0.03 X10(3) UL (ref 0–0.7)
EOSINOPHIL NFR BLD AUTO: 0.4 %
ERYTHROCYTE [DISTWIDTH] IN BLOOD BY AUTOMATED COUNT: 13.9 % (ref 11–15)
GLOBULIN PLAS-MCNC: 3 G/DL (ref 2.8–4.4)
GLOBULIN SER-MCNC: 3 G/DL
GLUCOSE BLD-MCNC: 88 MG/DL (ref 70–99)
GLUCOSE SERPL-MCNC: 88 MG/DL
GLUCOSE SERPL-MCNC: 90 MG/DL
HAV IGM SER QL: 1.9 MG/DL (ref 1.6–2.6)
HCT VFR BLD AUTO: 33.3 % (ref 39–53)
HCT VFR BLD CALC: 33.3 %
HDLC SERPL-MCNC: 67 MG/DL
HGB BLD-MCNC: 10.4 G/DL
HGB BLD-MCNC: 10.4 G/DL (ref 13–17.5)
IMM GRANULOCYTES # BLD AUTO: 0.09 X10(3) UL (ref 0–1)
IMM GRANULOCYTES NFR BLD: 1.2 %
LDLC SERPL CALC-MCNC: 50 MG/DL
LYMPHOCYTES # BLD AUTO: 0.49 X10(3) UL (ref 1–4)
LYMPHOCYTES NFR BLD AUTO: 6.8 %
M PROTEIN MFR SERPL ELPH: 6.2 G/DL (ref 6.4–8.2)
MCH RBC QN AUTO: 29.7 PG
MCH RBC QN AUTO: 29.7 PG (ref 26–34)
MCHC RBC AUTO-ENTMCNC: 31.2 G/DL
MCHC RBC AUTO-ENTMCNC: 31.2 G/DL (ref 31–37)
MCV RBC AUTO: 95.1 FL
MCV RBC AUTO: 95.1 FL (ref 80–100)
MONOCYTES # BLD AUTO: 0.7 X10(3) UL (ref 0.1–1)
MONOCYTES NFR BLD AUTO: 9.7 %
NEUTROPHILS # BLD AUTO: 5.9 X10 (3) UL (ref 1.5–7.7)
NEUTROPHILS # BLD AUTO: 5.9 X10(3) UL (ref 1.5–7.7)
NEUTROPHILS NFR BLD AUTO: 81.8 %
NONHDLC SERPL-MCNC: 61 MG/DL
OSMOLALITY SERPL CALC.SUM OF ELEC: 312 MOSM/KG (ref 275–295)
PATIENT FASTING Y/N/NP: YES
PHOSPHATE SERPL-MCNC: 3.5 MG/DL (ref 2.5–4.9)
PLATELET # BLD AUTO: 194 10(3)UL (ref 150–450)
PLATELET # BLD: 194 K/MCL
POTASSIUM SERPL-SCNC: 4.2 MMOL/L
POTASSIUM SERPL-SCNC: 4.2 MMOL/L (ref 3.5–5.1)
POTASSIUM SERPL-SCNC: 4.3 MMOL/L
PROT SERPL-MCNC: 6.2 G/DL
RBC # BLD AUTO: 3.5 X10(6)UL (ref 3.8–5.8)
RBC # BLD: 3.5 10*6/UL
SODIUM SERPL-SCNC: 143 MMOL/L
SODIUM SERPL-SCNC: 143 MMOL/L
SODIUM SERPL-SCNC: 143 MMOL/L (ref 136–145)
TRIGL SERPL-MCNC: 56 MG/DL
VLDLC SERPL CALC-MCNC: 11 MG/DL
WBC # BLD AUTO: 7.2 X10(3) UL (ref 4–11)
WBC # BLD: 7.2 K/MCL

## 2020-01-01 PROCEDURE — 36415 COLL VENOUS BLD VENIPUNCTURE: CPT

## 2020-01-01 PROCEDURE — 84443 ASSAY THYROID STIM HORMONE: CPT

## 2020-01-01 PROCEDURE — 85025 COMPLETE CBC W/AUTO DIFF WBC: CPT

## 2020-01-01 PROCEDURE — 85045 AUTOMATED RETICULOCYTE COUNT: CPT

## 2020-01-01 PROCEDURE — 80069 RENAL FUNCTION PANEL: CPT

## 2020-01-01 PROCEDURE — 80061 LIPID PANEL: CPT

## 2020-01-01 PROCEDURE — 99214 OFFICE O/P EST MOD 30 MIN: CPT | Performed by: INTERNAL MEDICINE

## 2020-01-01 PROCEDURE — 80053 COMPREHEN METABOLIC PANEL: CPT

## 2020-01-01 PROCEDURE — G0463 HOSPITAL OUTPT CLINIC VISIT: HCPCS | Performed by: INTERNAL MEDICINE

## 2020-01-01 PROCEDURE — 83735 ASSAY OF MAGNESIUM: CPT

## 2020-01-01 PROCEDURE — 93880 EXTRACRANIAL BILAT STUDY: CPT | Performed by: INTERNAL MEDICINE

## 2020-01-01 PROCEDURE — 84100 ASSAY OF PHOSPHORUS: CPT

## 2020-01-01 PROCEDURE — 80048 BASIC METABOLIC PNL TOTAL CA: CPT

## 2020-01-01 PROCEDURE — 82728 ASSAY OF FERRITIN: CPT

## 2020-01-01 RX ORDER — RIVAROXABAN 15 MG/1
TABLET, FILM COATED ORAL
Qty: 180 TABLET | Refills: 3 | Status: SHIPPED | OUTPATIENT
Start: 2020-01-01

## 2020-01-01 RX ORDER — PREDNISONE 10 MG/1
TABLET ORAL
Qty: 30 TABLET | Refills: 5 | Status: SHIPPED | OUTPATIENT
Start: 2020-01-01 | End: 2020-01-01

## 2020-01-01 RX ORDER — FINASTERIDE 5 MG/1
TABLET, FILM COATED ORAL
Qty: 90 TABLET | Refills: 3 | Status: ON HOLD | OUTPATIENT
Start: 2020-01-01 | End: 2021-01-01

## 2020-01-01 RX ORDER — FUROSEMIDE 40 MG/1
TABLET ORAL
Qty: 90 TABLET | Refills: 0 | Status: SHIPPED | OUTPATIENT
Start: 2020-01-01 | End: 2020-01-01

## 2020-01-01 RX ORDER — LABETALOL 200 MG/1
TABLET, FILM COATED ORAL
Qty: 60 TABLET | Refills: 11 | Status: SHIPPED | OUTPATIENT
Start: 2020-01-01

## 2020-01-01 RX ORDER — LABETALOL 200 MG/1
TABLET, FILM COATED ORAL
Qty: 60 TABLET | Refills: 1 | Status: SHIPPED | OUTPATIENT
Start: 2020-01-01 | End: 2020-01-01

## 2020-01-01 RX ORDER — TAMSULOSIN HYDROCHLORIDE 0.4 MG/1
0.4 CAPSULE ORAL DAILY
Qty: 30 CAPSULE | Refills: 0 | OUTPATIENT
Start: 2020-01-01

## 2020-01-01 RX ORDER — PREDNISONE 10 MG/1
TABLET ORAL
Qty: 30 TABLET | Refills: 5 | Status: SHIPPED | OUTPATIENT
Start: 2020-01-01 | End: 2021-01-01 | Stop reason: ALTCHOICE

## 2020-01-01 RX ORDER — FUROSEMIDE 40 MG/1
TABLET ORAL
Qty: 90 TABLET | Refills: 3 | Status: SHIPPED | OUTPATIENT
Start: 2020-01-01

## 2020-01-01 RX ORDER — LABETALOL 200 MG/1
TABLET, FILM COATED ORAL
Qty: 60 TABLET | Refills: 0 | Status: SHIPPED | OUTPATIENT
Start: 2020-01-01 | End: 2020-01-01

## 2020-01-01 RX ORDER — TAMSULOSIN HYDROCHLORIDE 0.4 MG/1
0.4 CAPSULE ORAL DAILY
Qty: 90 CAPSULE | Refills: 3 | Status: SHIPPED | OUTPATIENT
Start: 2020-01-01

## 2020-01-01 RX ORDER — PREDNISONE 10 MG/1
TABLET ORAL
Qty: 30 TABLET | Refills: 0 | OUTPATIENT
Start: 2020-01-01

## 2020-01-01 ASSESSMENT — PATIENT HEALTH QUESTIONNAIRE - PHQ9
SUM OF ALL RESPONSES TO PHQ9 QUESTIONS 1 AND 2: 0
CLINICAL INTERPRETATION OF PHQ9 SCORE: NO FURTHER SCREENING NEEDED
2. FEELING DOWN, DEPRESSED OR HOPELESS: NOT AT ALL
CLINICAL INTERPRETATION OF PHQ2 SCORE: NO FURTHER SCREENING NEEDED
SUM OF ALL RESPONSES TO PHQ9 QUESTIONS 1 AND 2: 0
1. LITTLE INTEREST OR PLEASURE IN DOING THINGS: NOT AT ALL

## 2020-01-06 RX ORDER — RIVAROXABAN 15 MG/1
TABLET, FILM COATED ORAL
Qty: 30 TABLET | Refills: 7 | Status: SHIPPED | OUTPATIENT
Start: 2020-01-06 | End: 2020-01-01

## 2020-01-29 ENCOUNTER — TELEPHONE (OUTPATIENT)
Dept: INTERNAL MEDICINE CLINIC | Facility: CLINIC | Age: 75
End: 2020-01-29

## 2020-01-29 DIAGNOSIS — N17.9 ACUTE RENAL FAILURE SUPERIMPOSED ON STAGE 3 CHRONIC KIDNEY DISEASE, UNSPECIFIED ACUTE RENAL FAILURE TYPE: Primary | ICD-10-CM

## 2020-01-29 DIAGNOSIS — N18.3 ACUTE RENAL FAILURE SUPERIMPOSED ON STAGE 3 CHRONIC KIDNEY DISEASE, UNSPECIFIED ACUTE RENAL FAILURE TYPE: Primary | ICD-10-CM

## 2020-01-29 DIAGNOSIS — N28.9 RENAL INSUFFICIENCY: ICD-10-CM

## 2020-01-29 DIAGNOSIS — D63.8 ANEMIA IN OTHER CHRONIC DISEASES CLASSIFIED ELSEWHERE: ICD-10-CM

## 2020-01-29 RX ORDER — PANTOPRAZOLE SODIUM 40 MG/1
40 TABLET, DELAYED RELEASE ORAL
Qty: 90 TABLET | Refills: 3 | Status: SHIPPED | OUTPATIENT
Start: 2020-01-29 | End: 2021-01-01

## 2020-01-29 NOTE — TELEPHONE ENCOUNTER
Pantoprazole refilled. I did review labs from 12/23 and I had previously communicated with patient. No further action at this time just repeat labs and he will do so.

## 2020-01-29 NOTE — TELEPHONE ENCOUNTER
Dr. Dearl Ormond, pantoprazole refill pended for your review. Please review patient's recent blood work from 12/23/19.

## 2020-02-04 ENCOUNTER — LAB ENCOUNTER (OUTPATIENT)
Dept: LAB | Age: 75
End: 2020-02-04
Attending: INTERNAL MEDICINE
Payer: MEDICARE

## 2020-02-04 DIAGNOSIS — N28.9 RENAL INSUFFICIENCY: ICD-10-CM

## 2020-02-04 DIAGNOSIS — N17.9 ACUTE RENAL FAILURE SUPERIMPOSED ON STAGE 3 CHRONIC KIDNEY DISEASE, UNSPECIFIED ACUTE RENAL FAILURE TYPE: ICD-10-CM

## 2020-02-04 DIAGNOSIS — N18.3 ACUTE RENAL FAILURE SUPERIMPOSED ON STAGE 3 CHRONIC KIDNEY DISEASE, UNSPECIFIED ACUTE RENAL FAILURE TYPE: ICD-10-CM

## 2020-02-04 DIAGNOSIS — D63.8 ANEMIA IN OTHER CHRONIC DISEASES CLASSIFIED ELSEWHERE: ICD-10-CM

## 2020-02-04 LAB
ALBUMIN SERPL-MCNC: 3.1 G/DL (ref 3.4–5)
ANION GAP SERPL CALC-SCNC: 7 MMOL/L (ref 0–18)
BASOPHILS # BLD AUTO: 0.02 X10(3) UL (ref 0–0.2)
BASOPHILS NFR BLD AUTO: 0.3 %
BUN BLD-MCNC: 48 MG/DL (ref 7–18)
BUN/CREAT SERPL: 27.1 (ref 10–20)
CALCIUM BLD-MCNC: 8.8 MG/DL (ref 8.5–10.1)
CHLORIDE SERPL-SCNC: 109 MMOL/L (ref 98–112)
CO2 SERPL-SCNC: 26 MMOL/L (ref 21–32)
CREAT BLD-MCNC: 1.77 MG/DL (ref 0.7–1.3)
DEPRECATED RDW RBC AUTO: 53.9 FL (ref 35.1–46.3)
EOSINOPHIL # BLD AUTO: 0.02 X10(3) UL (ref 0–0.7)
EOSINOPHIL NFR BLD AUTO: 0.3 %
ERYTHROCYTE [DISTWIDTH] IN BLOOD BY AUTOMATED COUNT: 15 % (ref 11–15)
GLUCOSE BLD-MCNC: 105 MG/DL (ref 70–99)
HCT VFR BLD AUTO: 32.6 % (ref 39–53)
HGB BLD-MCNC: 10.2 G/DL (ref 13–17.5)
IMM GRANULOCYTES # BLD AUTO: 0.08 X10(3) UL (ref 0–1)
IMM GRANULOCYTES NFR BLD: 1.3 %
LYMPHOCYTES # BLD AUTO: 0.6 X10(3) UL (ref 1–4)
LYMPHOCYTES NFR BLD AUTO: 10 %
MCH RBC QN AUTO: 30.3 PG (ref 26–34)
MCHC RBC AUTO-ENTMCNC: 31.3 G/DL (ref 31–37)
MCV RBC AUTO: 96.7 FL (ref 80–100)
MONOCYTES # BLD AUTO: 0.49 X10(3) UL (ref 0.1–1)
MONOCYTES NFR BLD AUTO: 8.1 %
NEUTROPHILS # BLD AUTO: 4.82 X10 (3) UL (ref 1.5–7.7)
NEUTROPHILS # BLD AUTO: 4.82 X10(3) UL (ref 1.5–7.7)
NEUTROPHILS NFR BLD AUTO: 80 %
OSMOLALITY SERPL CALC.SUM OF ELEC: 307 MOSM/KG (ref 275–295)
PHOSPHATE SERPL-MCNC: 3 MG/DL (ref 2.5–4.9)
PLATELET # BLD AUTO: 211 10(3)UL (ref 150–450)
POTASSIUM SERPL-SCNC: 4 MMOL/L (ref 3.5–5.1)
RBC # BLD AUTO: 3.37 X10(6)UL (ref 3.8–5.8)
SODIUM SERPL-SCNC: 142 MMOL/L (ref 136–145)
WBC # BLD AUTO: 6 X10(3) UL (ref 4–11)

## 2020-02-04 PROCEDURE — 85025 COMPLETE CBC W/AUTO DIFF WBC: CPT

## 2020-02-04 PROCEDURE — 36415 COLL VENOUS BLD VENIPUNCTURE: CPT

## 2020-02-04 PROCEDURE — 80069 RENAL FUNCTION PANEL: CPT

## 2020-02-04 NOTE — TELEPHONE ENCOUNTER
Per Dr. Elo Shelton -   Pt down at lab; Standing Orders placed for monthly draw of Renal Panel [Dx:Renal [address]. Pt aware he is to repeat labs q month until otherwise advised.

## 2020-02-17 ENCOUNTER — OFFICE VISIT (OUTPATIENT)
Dept: INTERNAL MEDICINE CLINIC | Facility: CLINIC | Age: 75
End: 2020-02-17
Payer: MEDICARE

## 2020-02-17 VITALS
TEMPERATURE: 98 F | DIASTOLIC BLOOD PRESSURE: 66 MMHG | RESPIRATION RATE: 20 BRPM | HEART RATE: 55 BPM | WEIGHT: 211 LBS | SYSTOLIC BLOOD PRESSURE: 122 MMHG | OXYGEN SATURATION: 98 % | BODY MASS INDEX: 27 KG/M2

## 2020-02-17 DIAGNOSIS — N28.9 RENAL INSUFFICIENCY: Primary | ICD-10-CM

## 2020-02-17 DIAGNOSIS — I25.9 CHRONIC ISCHEMIC HEART DISEASE: ICD-10-CM

## 2020-02-17 DIAGNOSIS — I10 HYPERTENSION, BENIGN: ICD-10-CM

## 2020-02-17 DIAGNOSIS — K22.70 BARRETT'S ESOPHAGUS WITHOUT DYSPLASIA: ICD-10-CM

## 2020-02-17 DIAGNOSIS — I48.20 CHRONIC ATRIAL FIBRILLATION (HCC): ICD-10-CM

## 2020-02-17 PROCEDURE — 99214 OFFICE O/P EST MOD 30 MIN: CPT | Performed by: INTERNAL MEDICINE

## 2020-02-17 PROCEDURE — G0463 HOSPITAL OUTPT CLINIC VISIT: HCPCS | Performed by: INTERNAL MEDICINE

## 2020-02-17 NOTE — PROGRESS NOTES
Malia Rivera is a 66year old male. HPI:   He has been feeling well. He has lost about 9 lbs in last 6 weeks. Appetite good     Breathing well. No chest pain.      Renal insufficiency - creatinine down from 1.9 to 1.7 was 2.4 last Oct.     He is due for by mouth.  take 1 tablet (0.4MG)  by oral route  every day     • predniSONE 10 MG Oral Tab Take as directed (Patient not taking: Reported on 2/17/2020 ) 40 tablet 3   • predniSONE 10 MG Oral Tab Take 3 daily for 3 days then 2 daily for 3 days then 1 daily f Quit date: 1993        Years since quittin.1      Smokeless tobacco: Never Used    Alcohol use: Yes      Alcohol/week: 6.0 standard drinks      Types: 6 Standard drinks or equivalent per week      Comment: drinks occasionally    Drug use:  No

## 2020-03-02 ENCOUNTER — TELEPHONE (OUTPATIENT)
Dept: GASTROENTEROLOGY | Facility: CLINIC | Age: 75
End: 2020-03-02

## 2020-03-02 DIAGNOSIS — Z86.010 HISTORY OF COLON POLYPS: Primary | ICD-10-CM

## 2020-03-02 DIAGNOSIS — K22.719 BARRETT'S ESOPHAGUS WITH DYSPLASIA: ICD-10-CM

## 2020-03-02 RX ORDER — FUROSEMIDE 40 MG/1
40 TABLET ORAL DAILY
Status: ON HOLD | COMMUNITY
End: 2021-01-01

## 2020-03-02 NOTE — TELEPHONE ENCOUNTER
[facility] Endoscopy Report        Preoperative Diagnosis:  Quevedo's esophagus         Postoperative Diagnosis:  1. Quevedo's esophagus C7M9 without visible epithelial abnormality  2. Small hiatal hernia   3.   Gastric antral nodule       antrum were normal with the exception of a 7 mm soft epithelial nodule in the superior/posterior aspect of the prepyloric antrum. This was biopsied.   The duodenal bulb and post bulbar region were normal.  There was a brown speckled pattern of the mucosa i 3 iron tablets daily. Nikky Yu is under the care of Dr. Mahlon Kehr. [facility] recent iron studies are in the normal range.  He is mildly anemic. Nikky Yu will discuss decreasing the iron with her. Nikky Yu will continue acid suppression.  I am recommending a follow-up EGD in 3 y esophagus  · Negative for dysplasia     D.   Esophagus at 39 cm; biopsy:  · Quevedo's esophagus  · Negative for dysplasia     E.  Esophagus at 37 cm; biopsy:  · Quevedo's esophagus  · Negative for dysplasia        All cytochemical and immunohistochemical st

## 2020-03-02 NOTE — TELEPHONE ENCOUNTER
The patient's chart has been reviewed. Okay to schedule pt for 3-year EGD/5-year colonoscopy r/t Quevedo's esophagus/history of colon polyps with Dr. Jeffrey .      Advise MAC sedation r/t cardiac hx with split dose Colyte/TriLyte or equivalent(eRx) pre

## 2020-03-02 NOTE — TELEPHONE ENCOUNTER
Last CLN on 1/30/2015 @ [facility] see scanned report in media from 2/3/2015.   PATHOLOGY-SURGICAL   Order: [id number]   Status:  Final result   Visible to patient:  No (Not Released)   Component 5yr ago   SURGICAL PATHOLOGY REPORT                                E DESCRIPTION          (Continued)       brown-tan mucosal covered fragments measuring 0.3 x 0.2 x 0.1 cm aggregate and   ranging from 0.1-0.3 cm in greatest individual dimension.  The specimen is   submitted in a single cassette.       Specimen C is received

## 2020-03-02 NOTE — TELEPHONE ENCOUNTER
GI RNs - Please advise on Xarelto orders. Procedure on 05/26/2020.   Prescribing MD: Dr Sylvie Chandler

## 2020-03-02 NOTE — TELEPHONE ENCOUNTER
Scheduled for:  COLONOSCOPY 39093; EGD [address]  Provider Name:  Dr Mariposa Green  Date:  05/26/2020  Location:  [facility]  Sedation:  MAC  Time:  10:15 am (pt is aware to arrive at 0915)    Prep:  Colyte  Meds/Allergies Reconciled?:  Physician reviewed  Diagnosis with co

## 2020-03-02 NOTE — TELEPHONE ENCOUNTER
Gaurav Segura-    Patient is due for EGD and CLN. I reviewed medications, allergies and pharmacy. Please advise on orders and prep. Thank you.       Last Procedure, Date, MD:  Colonoscopy w/ polypectomy and random bx, Dr. Eugenie Munoz 1/30/2015; EGD w/ bx 1/13

## 2020-03-03 NOTE — TELEPHONE ENCOUNTER
Anticoagulation hold request faxed to Dr. Agustin Grider at [phone], confirmation received, await reply.

## 2020-03-04 ENCOUNTER — TELEPHONE (OUTPATIENT)
Dept: CARDIOLOGY | Age: 75
End: 2020-03-04

## 2020-03-11 NOTE — TELEPHONE ENCOUNTER
Received faxed note from Dr Shekhar Jerry, states \"per Dr Shekhar Jerry, pt may hold Xarelto for 2 days prior to endoscopy with Dr Jaci Mckeon on 5/26/2020. \" patient was contacted and instructions reviewed. He will hold Xarelto 2 days prior and morning of procedure.

## 2020-03-13 RX ORDER — SIMVASTATIN 40 MG
TABLET ORAL
Qty: 90 TABLET | Refills: 3 | Status: SHIPPED | OUTPATIENT
Start: 2020-03-13

## 2020-03-13 RX ORDER — ISOSORBIDE MONONITRATE 30 MG/1
TABLET, EXTENDED RELEASE ORAL
Qty: 90 TABLET | Refills: 3 | Status: SHIPPED | OUTPATIENT
Start: 2020-03-13 | End: 2021-01-01

## 2020-03-20 RX ORDER — LABETALOL 200 MG/1
TABLET, FILM COATED ORAL
Qty: 60 TABLET | Refills: 0 | Status: SHIPPED | OUTPATIENT
Start: 2020-03-20 | End: 2020-01-01

## 2020-03-20 RX ORDER — FUROSEMIDE 40 MG/1
TABLET ORAL
Qty: 90 TABLET | Refills: 0 | Status: SHIPPED | OUTPATIENT
Start: 2020-03-20 | End: 2020-01-01

## 2020-03-31 ENCOUNTER — TELEPHONE (OUTPATIENT)
Dept: INTERNAL MEDICINE CLINIC | Facility: CLINIC | Age: 75
End: 2020-03-31

## 2020-03-31 DIAGNOSIS — N28.9 RENAL INSUFFICIENCY: Primary | ICD-10-CM

## 2020-04-08 ENCOUNTER — LAB ENCOUNTER (OUTPATIENT)
Dept: LAB | Facility: HOSPITAL | Age: 75
End: 2020-04-08
Attending: INTERNAL MEDICINE
Payer: MEDICARE

## 2020-04-08 DIAGNOSIS — N28.9 RENAL INSUFFICIENCY: ICD-10-CM

## 2020-04-08 PROCEDURE — 36415 COLL VENOUS BLD VENIPUNCTURE: CPT

## 2020-04-08 PROCEDURE — 80048 BASIC METABOLIC PNL TOTAL CA: CPT

## 2020-04-08 PROCEDURE — 85025 COMPLETE CBC W/AUTO DIFF WBC: CPT

## 2020-04-08 PROCEDURE — 83735 ASSAY OF MAGNESIUM: CPT

## 2020-04-20 ENCOUNTER — OFFICE VISIT (OUTPATIENT)
Dept: INTERNAL MEDICINE CLINIC | Facility: CLINIC | Age: 75
End: 2020-04-20
Payer: MEDICARE

## 2020-04-20 VITALS
HEART RATE: 53 BPM | OXYGEN SATURATION: 98 % | BODY MASS INDEX: 26.74 KG/M2 | TEMPERATURE: 98 F | SYSTOLIC BLOOD PRESSURE: 130 MMHG | HEIGHT: 74 IN | WEIGHT: 208.38 LBS | DIASTOLIC BLOOD PRESSURE: 70 MMHG

## 2020-04-20 DIAGNOSIS — I25.9 CHRONIC ISCHEMIC HEART DISEASE: ICD-10-CM

## 2020-04-20 DIAGNOSIS — I48.20 CHRONIC ATRIAL FIBRILLATION (HCC): Primary | ICD-10-CM

## 2020-04-20 DIAGNOSIS — I10 HYPERTENSION, BENIGN: ICD-10-CM

## 2020-04-20 DIAGNOSIS — E78.5 HYPERLIPIDEMIA, UNSPECIFIED HYPERLIPIDEMIA TYPE: ICD-10-CM

## 2020-04-20 PROCEDURE — G0463 HOSPITAL OUTPT CLINIC VISIT: HCPCS | Performed by: INTERNAL MEDICINE

## 2020-04-20 PROCEDURE — 99214 OFFICE O/P EST MOD 30 MIN: CPT | Performed by: INTERNAL MEDICINE

## 2020-04-20 NOTE — PROGRESS NOTES
Padilla Delgado is a 66year old male. HPI:   He is doing well except for progressive decline in vision - distance vision is very poor and now he read up close and does not need his reading glasses. This sounds like myopic shift from cataracts.      Some bl prevent gout. 30 tablet 11   • ASPIRIN 81 MG Oral Tab Take 1 tablet (81 mg total) by mouth nightly. 30 tablet 0   • rivaroxaban (XARELTO) 15 MG Oral Tab Take 15 mg by mouth daily with food.      • Labetalol HCl 200 MG Oral Tab Take 200 mg by mouth 2 (two) t carotid artery 12/2/2013   • Recent change in frequency of bowel movements    • Stroke [facility]) 1993   • UTI (urinary tract infection) 08/2017      Social History:  Social History    Tobacco Use      Smoking status: Former Smoker        Packs/day: 1.00 the plan. The patient is asked to return in 2 months.

## 2020-05-04 ENCOUNTER — TELEPHONE (OUTPATIENT)
Dept: GASTROENTEROLOGY | Facility: CLINIC | Age: 75
End: 2020-05-04

## 2020-05-04 DIAGNOSIS — Z87.19 HISTORY OF BARRETT'S ESOPHAGUS: ICD-10-CM

## 2020-05-04 DIAGNOSIS — Z86.010 PERSONAL HISTORY OF COLONIC POLYPS: Primary | ICD-10-CM

## 2020-05-05 NOTE — TELEPHONE ENCOUNTER
Pt would like to CANCEL his procedures. Pt will CB to reschedule, he does not wish to reschedule at this time. I sent an electronic CANCEL request to Endo Scheduling and received a confirmation today. Canceled in [address] TE closed.         Cathlene Olszewski

## 2020-06-22 NOTE — PROGRESS NOTES
Oriana Tatum is a 66year old male. HPI:   He is feeling well generally. He is managing his BP and takes hydralazine 25 mg as needed for elevated BP (in addition to his usual meds) - his BP this morning was 150/ 72 and he took one hydralazine.      He fe Take 1 tablet (100 mg total) by mouth daily. To prevent gout. 30 tablet 11   • ASPIRIN 81 MG Oral Tab Take 1 tablet (81 mg total) by mouth nightly. 30 tablet 0   • rivaroxaban (XARELTO) 15 MG Oral Tab Take 15 mg by mouth daily with food.      • Labetalol HC Packs/day: 1.00        Years: 45.00        Pack years: 39        Types: Cigarettes        Start date: 10/3/1958        Quit date: 1993        Years since quittin.4      Smokeless tobacco: Never Used    Alcohol use:  Yes      Alcohol/week: 6.0 stand

## 2020-06-26 NOTE — TELEPHONE ENCOUNTER
Current refill request refused due to refill is either a duplicate request or has active refills at the pharmacy. Check previous templates.     Requested Prescriptions     Refused Prescriptions Disp Refills   • tamsulosin ([address]) cap [Pharmacy Med Name: Noemi Hint

## 2020-08-17 NOTE — PROGRESS NOTES
Malia Rivera is a 66year old male. HPI:   He is doing well and no new issues. No ED or UC visits. He just saw Dr Michael Turner and everything stable. He saw Dr Sharonda Parry about 2 months ago and stable.      IHD - no chest pain or sob    Chronic renal in mouth every morning before breakfast. 90 tablet 3   • allopurinol 100 MG Oral Tab Take 1 tablet (100 mg total) by mouth daily. To prevent gout. 30 tablet 11   • ASPIRIN 81 MG Oral Tab Take 1 tablet (81 mg total) by mouth nightly.  30 tablet 0   • rivaroxaba History:  Social History    Tobacco Use      Smoking status: Former Smoker        Packs/day: 1.00        Years: 45.00        Pack years: 39        Types: Cigarettes        Start date: 10/3/1958        Quit date: 1993        Years since quittin.6

## 2020-10-07 NOTE — TELEPHONE ENCOUNTER
I did discuss Aristides's lab results with him indicating blood count down a little and kidney function a little bit worse. He tells me he has been taking excessive diuretics in an attempt to decrease his fluid in the lower extremities.   He has not had any symp

## 2020-11-17 NOTE — PROGRESS NOTES
Austin Dahl is a 66year old male. HPI:   He has been retaining fluid recently but no dyspnea. Creatinine up to 2.45 -  He saw Dr Rudy Padilla recently and no changes in strategy other than to reduce sodium in diet. He is eating well.       Ischemic heart mg total) by mouth nightly. 30 tablet 0   • rivaroxaban (XARELTO) 15 MG Oral Tab Take 15 mg by mouth daily with food. • Labetalol HCl 200 MG Oral Tab Take 200 mg by mouth 2 (two) times daily.      • Vitamin B-12 (VITAMIN B12) 1000 MCG Oral Tab Take  by Quit date: 1993        Years since quittin.8      Smokeless tobacco: Never Used    Alcohol use: Yes      Alcohol/week: 6.0 standard drinks      Types: 6 Standard drinks or equivalent per week      Comment: drinks occasionally    Drug use:  No

## 2020-12-21 NOTE — TELEPHONE ENCOUNTER
Acute Rx denies;    If pt calls, we will need to ask about symptoms    Refill request has failed the Ambulatory Medication Refill Standing Order and is routed to the primary physician to review the following:    Requested Prescriptions     Refused Prescript

## 2021-01-01 ENCOUNTER — OFFICE VISIT (OUTPATIENT)
Dept: INTERNAL MEDICINE CLINIC | Facility: CLINIC | Age: 76
End: 2021-01-01
Payer: MEDICARE

## 2021-01-01 ENCOUNTER — ANESTHESIA EVENT (OUTPATIENT)
Dept: SURGERY | Facility: HOSPITAL | Age: 76
DRG: 291 | End: 2021-01-01
Payer: MEDICARE

## 2021-01-01 ENCOUNTER — LAB REQUISITION (OUTPATIENT)
Dept: LAB | Facility: HOSPITAL | Age: 76
End: 2021-01-01
Payer: MEDICARE

## 2021-01-01 ENCOUNTER — IMMUNIZATION (OUTPATIENT)
Dept: LAB | Facility: HOSPITAL | Age: 76
End: 2021-01-01
Attending: EMERGENCY MEDICINE
Payer: MEDICARE

## 2021-01-01 ENCOUNTER — APPOINTMENT (OUTPATIENT)
Dept: GENERAL RADIOLOGY | Facility: HOSPITAL | Age: 76
DRG: 291 | End: 2021-01-01
Attending: UROLOGY
Payer: MEDICARE

## 2021-01-01 ENCOUNTER — APPOINTMENT (OUTPATIENT)
Dept: ULTRASOUND IMAGING | Facility: HOSPITAL | Age: 76
DRG: 291 | End: 2021-01-01
Attending: INTERNAL MEDICINE
Payer: MEDICARE

## 2021-01-01 ENCOUNTER — APPOINTMENT (OUTPATIENT)
Dept: GENERAL RADIOLOGY | Facility: HOSPITAL | Age: 76
DRG: 291 | End: 2021-01-01
Attending: HOSPITALIST
Payer: MEDICARE

## 2021-01-01 ENCOUNTER — TELEPHONE (OUTPATIENT)
Dept: INTERNAL MEDICINE CLINIC | Facility: CLINIC | Age: 76
End: 2021-01-01

## 2021-01-01 ENCOUNTER — ANESTHESIA (OUTPATIENT)
Dept: SURGERY | Facility: HOSPITAL | Age: 76
DRG: 291 | End: 2021-01-01
Payer: MEDICARE

## 2021-01-01 ENCOUNTER — PATIENT OUTREACH (OUTPATIENT)
Dept: CASE MANAGEMENT | Age: 76
End: 2021-01-01

## 2021-01-01 ENCOUNTER — HOSPITAL ENCOUNTER (INPATIENT)
Facility: HOSPITAL | Age: 76
LOS: 13 days | Discharge: HOME | DRG: 291 | End: 2021-01-01
Attending: EMERGENCY MEDICINE | Admitting: HOSPITALIST
Payer: MEDICARE

## 2021-01-01 ENCOUNTER — APPOINTMENT (OUTPATIENT)
Dept: GENERAL RADIOLOGY | Facility: HOSPITAL | Age: 76
DRG: 291 | End: 2021-01-01
Attending: NURSE PRACTITIONER
Payer: MEDICARE

## 2021-01-01 ENCOUNTER — HOSPITAL ENCOUNTER (INPATIENT)
Facility: HOSPITAL | Age: 76
LOS: 11 days | Discharge: NURSING, REHAB OR LONG-TERM CARE | DRG: 291 | End: 2021-01-01
Attending: EMERGENCY MEDICINE | Admitting: HOSPITALIST
Payer: MEDICARE

## 2021-01-01 ENCOUNTER — IMMUNIZATION (OUTPATIENT)
Dept: LAB | Facility: HOSPITAL | Age: 76
End: 2021-01-01
Attending: HOSPITALIST
Payer: MEDICARE

## 2021-01-01 ENCOUNTER — LAB REQUISITION (OUTPATIENT)
Dept: LAB | Facility: HOSPITAL | Age: 76
DRG: 291 | End: 2021-01-01
Payer: MEDICARE

## 2021-01-01 ENCOUNTER — APPOINTMENT (OUTPATIENT)
Dept: MRI IMAGING | Facility: HOSPITAL | Age: 76
DRG: 291 | End: 2021-01-01
Attending: INTERNAL MEDICINE
Payer: MEDICARE

## 2021-01-01 ENCOUNTER — TELEPHONE (OUTPATIENT)
Dept: GASTROENTEROLOGY | Facility: CLINIC | Age: 76
End: 2021-01-01

## 2021-01-01 ENCOUNTER — APPOINTMENT (OUTPATIENT)
Dept: GENERAL RADIOLOGY | Facility: HOSPITAL | Age: 76
DRG: 291 | End: 2021-01-01
Attending: EMERGENCY MEDICINE
Payer: MEDICARE

## 2021-01-01 ENCOUNTER — ANESTHESIA EVENT (OUTPATIENT)
Dept: ENDOSCOPY | Facility: HOSPITAL | Age: 76
DRG: 291 | End: 2021-01-01
Payer: MEDICARE

## 2021-01-01 ENCOUNTER — ANESTHESIA (OUTPATIENT)
Dept: ENDOSCOPY | Facility: HOSPITAL | Age: 76
DRG: 291 | End: 2021-01-01
Payer: MEDICARE

## 2021-01-01 ENCOUNTER — LAB ENCOUNTER (OUTPATIENT)
Dept: LAB | Age: 76
End: 2021-01-01
Attending: INTERNAL MEDICINE
Payer: MEDICARE

## 2021-01-01 ENCOUNTER — APPOINTMENT (OUTPATIENT)
Dept: CV DIAGNOSTICS | Facility: HOSPITAL | Age: 76
DRG: 291 | End: 2021-01-01
Attending: NURSE PRACTITIONER
Payer: MEDICARE

## 2021-01-01 VITALS
SYSTOLIC BLOOD PRESSURE: 140 MMHG | HEART RATE: 68 BPM | DIASTOLIC BLOOD PRESSURE: 72 MMHG | TEMPERATURE: 98 F | OXYGEN SATURATION: 98 % | HEIGHT: 73 IN | WEIGHT: 193.81 LBS | BODY MASS INDEX: 25.69 KG/M2

## 2021-01-01 VITALS
WEIGHT: 212.38 LBS | DIASTOLIC BLOOD PRESSURE: 78 MMHG | BODY MASS INDEX: 27.25 KG/M2 | SYSTOLIC BLOOD PRESSURE: 170 MMHG | HEIGHT: 74 IN | HEART RATE: 56 BPM | OXYGEN SATURATION: 98 % | TEMPERATURE: 98 F

## 2021-01-01 VITALS
DIASTOLIC BLOOD PRESSURE: 72 MMHG | BODY MASS INDEX: 25.3 KG/M2 | SYSTOLIC BLOOD PRESSURE: 146 MMHG | RESPIRATION RATE: 20 BRPM | WEIGHT: 190.88 LBS | TEMPERATURE: 98 F | HEART RATE: 55 BPM | HEIGHT: 73 IN | OXYGEN SATURATION: 98 %

## 2021-01-01 VITALS
WEIGHT: 191.69 LBS | OXYGEN SATURATION: 96 % | SYSTOLIC BLOOD PRESSURE: 137 MMHG | BODY MASS INDEX: 25.41 KG/M2 | TEMPERATURE: 98 F | DIASTOLIC BLOOD PRESSURE: 75 MMHG | HEART RATE: 67 BPM | HEIGHT: 73 IN | RESPIRATION RATE: 18 BRPM

## 2021-01-01 DIAGNOSIS — Z12.5 PROSTATE CANCER SCREENING: ICD-10-CM

## 2021-01-01 DIAGNOSIS — R60.9 EDEMA, UNSPECIFIED TYPE: ICD-10-CM

## 2021-01-01 DIAGNOSIS — E78.00 HYPERCHOLESTEREMIA: ICD-10-CM

## 2021-01-01 DIAGNOSIS — N18.31 ACUTE RENAL FAILURE SUPERIMPOSED ON STAGE 3A CHRONIC KIDNEY DISEASE, UNSPECIFIED ACUTE RENAL FAILURE TYPE (HCC): ICD-10-CM

## 2021-01-01 DIAGNOSIS — Z23 NEED FOR VACCINATION: ICD-10-CM

## 2021-01-01 DIAGNOSIS — Z23 NEED FOR VACCINATION: Primary | ICD-10-CM

## 2021-01-01 DIAGNOSIS — I50.9 ACUTE ON CHRONIC CONGESTIVE HEART FAILURE, UNSPECIFIED HEART FAILURE TYPE (HCC): ICD-10-CM

## 2021-01-01 DIAGNOSIS — I10 HYPERTENSION, BENIGN: ICD-10-CM

## 2021-01-01 DIAGNOSIS — D64.9 ANEMIA, UNSPECIFIED TYPE: ICD-10-CM

## 2021-01-01 DIAGNOSIS — I50.9 ACUTE ON CHRONIC CONGESTIVE HEART FAILURE, UNSPECIFIED HEART FAILURE TYPE (HCC): Primary | ICD-10-CM

## 2021-01-01 DIAGNOSIS — L03.116 LEFT LEG CELLULITIS: ICD-10-CM

## 2021-01-01 DIAGNOSIS — N28.9 RENAL INSUFFICIENCY: ICD-10-CM

## 2021-01-01 DIAGNOSIS — I48.20 CHRONIC ATRIAL FIBRILLATION (HCC): ICD-10-CM

## 2021-01-01 DIAGNOSIS — E72.11 HYPERHOMOCYSTEINEMIA (HCC): ICD-10-CM

## 2021-01-01 DIAGNOSIS — I25.9 CHRONIC ISCHEMIC HEART DISEASE: ICD-10-CM

## 2021-01-01 DIAGNOSIS — Z02.9 ENCOUNTERS FOR UNSPECIFIED ADMINISTRATIVE PURPOSE: ICD-10-CM

## 2021-01-01 DIAGNOSIS — I13.0 HYPERTENSIVE HEART AND CHRONIC KIDNEY DISEASE WITH HEART FAILURE AND STAGE 1 THROUGH STAGE 4 CHRONIC KIDNEY DISEASE, OR UNSPECIFIED CHRONIC KIDNEY DISEASE (HCC): ICD-10-CM

## 2021-01-01 DIAGNOSIS — N28.9 RENAL INSUFFICIENCY: Primary | ICD-10-CM

## 2021-01-01 DIAGNOSIS — Z00.00 ENCOUNTER FOR MEDICARE ANNUAL WELLNESS EXAM: Primary | ICD-10-CM

## 2021-01-01 DIAGNOSIS — R33.9 URINARY RETENTION: ICD-10-CM

## 2021-01-01 DIAGNOSIS — R06.00 DYSPNEA, UNSPECIFIED TYPE: ICD-10-CM

## 2021-01-01 DIAGNOSIS — N17.9 ACUTE RENAL FAILURE SUPERIMPOSED ON STAGE 3A CHRONIC KIDNEY DISEASE, UNSPECIFIED ACUTE RENAL FAILURE TYPE (HCC): ICD-10-CM

## 2021-01-01 DIAGNOSIS — D50.9 IRON DEFICIENCY ANEMIA, UNSPECIFIED IRON DEFICIENCY ANEMIA TYPE: ICD-10-CM

## 2021-01-01 DIAGNOSIS — K92.2 GASTROINTESTINAL HEMORRHAGE, UNSPECIFIED GASTROINTESTINAL HEMORRHAGE TYPE: ICD-10-CM

## 2021-01-01 DIAGNOSIS — E78.00 HYPERCHOLESTEREMIA: Primary | ICD-10-CM

## 2021-01-01 DIAGNOSIS — M10.062 ACUTE IDIOPATHIC GOUT OF LEFT KNEE: ICD-10-CM

## 2021-01-01 DIAGNOSIS — K22.70 BARRETT'S ESOPHAGUS WITHOUT DYSPLASIA: ICD-10-CM

## 2021-01-01 DIAGNOSIS — M10.9 GOUT, UNSPECIFIED CAUSE, UNSPECIFIED CHRONICITY, UNSPECIFIED SITE: ICD-10-CM

## 2021-01-01 DIAGNOSIS — M10.9 GOUTY ARTHRITIS: ICD-10-CM

## 2021-01-01 DIAGNOSIS — E78.5 HYPERLIPIDEMIA, UNSPECIFIED HYPERLIPIDEMIA TYPE: ICD-10-CM

## 2021-01-01 DIAGNOSIS — M10.9 GOUT OF LEFT KNEE, UNSPECIFIED CAUSE, UNSPECIFIED CHRONICITY: ICD-10-CM

## 2021-01-01 LAB
ALBUMIN SERPL-MCNC: 2.2 G/DL (ref 3.4–5)
ALBUMIN SERPL-MCNC: 2.3 G/DL (ref 3.4–5)
ALBUMIN SERPL-MCNC: 2.4 G/DL (ref 3.4–5)
ALBUMIN SERPL-MCNC: 2.5 G/DL (ref 3.4–5)
ALBUMIN SERPL-MCNC: 2.6 G/DL (ref 3.4–5)
ALBUMIN SERPL-MCNC: 2.7 G/DL (ref 3.4–5)
ALBUMIN SERPL-MCNC: 2.8 G/DL (ref 3.4–5)
ALBUMIN SERPL-MCNC: 2.8 G/DL (ref 3.4–5)
ALBUMIN/GLOB SERPL: 1.1 {RATIO} (ref 1–2)
ALP LIVER SERPL-CCNC: 64 U/L
ALT SERPL-CCNC: 17 U/L
ANION GAP SERPL CALC-SCNC: 10 MMOL/L (ref 0–18)
ANION GAP SERPL CALC-SCNC: 10 MMOL/L (ref 0–18)
ANION GAP SERPL CALC-SCNC: 4 MMOL/L (ref 0–18)
ANION GAP SERPL CALC-SCNC: 4 MMOL/L (ref 0–18)
ANION GAP SERPL CALC-SCNC: 5 MMOL/L (ref 0–18)
ANION GAP SERPL CALC-SCNC: 6 MMOL/L (ref 0–18)
ANION GAP SERPL CALC-SCNC: 7 MMOL/L (ref 0–18)
ANION GAP SERPL CALC-SCNC: 8 MMOL/L (ref 0–18)
ANION GAP SERPL CALC-SCNC: 8 MMOL/L (ref 0–18)
ANION GAP SERPL CALC-SCNC: 9 MMOL/L (ref 0–18)
ANTIBODY SCREEN: NEGATIVE
ANTIBODY SCREEN: NEGATIVE
AST SERPL-CCNC: 9 U/L (ref 15–37)
BASOPHILS # BLD AUTO: 0 X10(3) UL (ref 0–0.2)
BASOPHILS # BLD AUTO: 0.01 X10(3) UL (ref 0–0.2)
BASOPHILS # BLD AUTO: 0.02 X10(3) UL (ref 0–0.2)
BASOPHILS NFR BLD AUTO: 0 %
BASOPHILS NFR BLD AUTO: 0.1 %
BASOPHILS NFR BLD AUTO: 0.2 %
BASOPHILS NFR BLD AUTO: 0.3 %
BASOPHILS NFR BLD AUTO: 0.3 %
BASOPHILS NFR BLD AUTO: 0.4 %
BILIRUB SERPL-MCNC: 0.7 MG/DL (ref 0.1–2)
BLOOD TYPE BARCODE: 7300
BUN BLD-MCNC: 102 MG/DL (ref 7–18)
BUN BLD-MCNC: 64 MG/DL (ref 7–18)
BUN BLD-MCNC: 64 MG/DL (ref 7–18)
BUN BLD-MCNC: 66 MG/DL (ref 7–18)
BUN BLD-MCNC: 73 MG/DL (ref 7–18)
BUN BLD-MCNC: 77 MG/DL (ref 7–18)
BUN BLD-MCNC: 77 MG/DL (ref 7–18)
BUN BLD-MCNC: 82 MG/DL (ref 7–18)
BUN BLD-MCNC: 85 MG/DL (ref 7–18)
BUN BLD-MCNC: 88 MG/DL (ref 7–18)
BUN BLD-MCNC: 94 MG/DL (ref 7–18)
BUN BLD-MCNC: 98 MG/DL (ref 7–18)
BUN/CREAT SERPL: 21.9 (ref 10–20)
BUN/CREAT SERPL: 22.3 (ref 10–20)
BUN/CREAT SERPL: 23.4 (ref 10–20)
BUN/CREAT SERPL: 23.5 (ref 10–20)
BUN/CREAT SERPL: 24.1 (ref 10–20)
BUN/CREAT SERPL: 27.3 (ref 10–20)
BUN/CREAT SERPL: 27.8 (ref 10–20)
BUN/CREAT SERPL: 29.3 (ref 10–20)
BUN/CREAT SERPL: 29.7 (ref 10–20)
BUN/CREAT SERPL: 35.1 (ref 10–20)
BUN/CREAT SERPL: 39.8 (ref 10–20)
BUN/CREAT SERPL: 40.1 (ref 10–20)
BUN/CREAT SERPL: 40.9 (ref 10–20)
BUN/CREAT SERPL: 43.8 (ref 10–20)
BUN/CREAT SERPL: 43.8 (ref 10–20)
CALCIUM BLD-MCNC: 7.7 MG/DL (ref 8.5–10.1)
CALCIUM BLD-MCNC: 8 MG/DL (ref 8.5–10.1)
CALCIUM BLD-MCNC: 8 MG/DL (ref 8.5–10.1)
CALCIUM BLD-MCNC: 8.2 MG/DL (ref 8.5–10.1)
CALCIUM BLD-MCNC: 8.2 MG/DL (ref 8.5–10.1)
CALCIUM BLD-MCNC: 8.3 MG/DL (ref 8.5–10.1)
CALCIUM BLD-MCNC: 8.3 MG/DL (ref 8.5–10.1)
CALCIUM BLD-MCNC: 8.4 MG/DL (ref 8.5–10.1)
CALCIUM BLD-MCNC: 8.4 MG/DL (ref 8.5–10.1)
CALCIUM BLD-MCNC: 8.5 MG/DL (ref 8.5–10.1)
CALCIUM BLD-MCNC: 8.6 MG/DL (ref 8.5–10.1)
CALCIUM BLD-MCNC: 8.6 MG/DL (ref 8.5–10.1)
CALCIUM BLD-MCNC: 8.8 MG/DL (ref 8.5–10.1)
CALCIUM BLD-MCNC: 8.8 MG/DL (ref 8.5–10.1)
CALCIUM BLD-MCNC: 9.1 MG/DL (ref 8.5–10.1)
CHLORIDE SERPL-SCNC: 101 MMOL/L (ref 98–112)
CHLORIDE SERPL-SCNC: 103 MMOL/L (ref 98–112)
CHLORIDE SERPL-SCNC: 104 MMOL/L (ref 98–112)
CHLORIDE SERPL-SCNC: 104 MMOL/L (ref 98–112)
CHLORIDE SERPL-SCNC: 105 MMOL/L (ref 98–112)
CHLORIDE SERPL-SCNC: 105 MMOL/L (ref 98–112)
CHLORIDE SERPL-SCNC: 106 MMOL/L (ref 98–112)
CHLORIDE SERPL-SCNC: 107 MMOL/L (ref 98–112)
CHLORIDE SERPL-SCNC: 107 MMOL/L (ref 98–112)
CHLORIDE SERPL-SCNC: 108 MMOL/L (ref 98–112)
CHLORIDE SERPL-SCNC: 110 MMOL/L (ref 98–112)
CHOLEST SMN-MCNC: 118 MG/DL (ref ?–200)
CO2 SERPL-SCNC: 24 MMOL/L (ref 21–32)
CO2 SERPL-SCNC: 24 MMOL/L (ref 21–32)
CO2 SERPL-SCNC: 25 MMOL/L (ref 21–32)
CO2 SERPL-SCNC: 25 MMOL/L (ref 21–32)
CO2 SERPL-SCNC: 26 MMOL/L (ref 21–32)
CO2 SERPL-SCNC: 27 MMOL/L (ref 21–32)
CO2 SERPL-SCNC: 28 MMOL/L (ref 21–32)
CO2 SERPL-SCNC: 29 MMOL/L (ref 21–32)
CO2 SERPL-SCNC: 29 MMOL/L (ref 21–32)
CO2 SERPL-SCNC: 30 MMOL/L (ref 21–32)
CREAT BLD-MCNC: 2.08 MG/DL
CREAT BLD-MCNC: 2.12 MG/DL
CREAT BLD-MCNC: 2.15 MG/DL
CREAT BLD-MCNC: 2.24 MG/DL
CREAT BLD-MCNC: 2.33 MG/DL
CREAT BLD-MCNC: 2.36 MG/DL
CREAT BLD-MCNC: 2.37 MG/DL
CREAT BLD-MCNC: 2.42 MG/DL
CREAT BLD-MCNC: 2.59 MG/DL
CREAT BLD-MCNC: 2.73 MG/DL
CREAT BLD-MCNC: 2.8 MG/DL
CREAT BLD-MCNC: 2.81 MG/DL
CREAT BLD-MCNC: 2.87 MG/DL
CREAT BLD-MCNC: 3.02 MG/DL
CREAT BLD-MCNC: 3.2 MG/DL
DEPRECATED RDW RBC AUTO: 49.1 FL (ref 35.1–46.3)
DEPRECATED RDW RBC AUTO: 49.1 FL (ref 35.1–46.3)
DEPRECATED RDW RBC AUTO: 50.1 FL (ref 35.1–46.3)
DEPRECATED RDW RBC AUTO: 50.4 FL (ref 35.1–46.3)
DEPRECATED RDW RBC AUTO: 51.2 FL (ref 35.1–46.3)
DEPRECATED RDW RBC AUTO: 51.4 FL (ref 35.1–46.3)
DEPRECATED RDW RBC AUTO: 51.5 FL (ref 35.1–46.3)
DEPRECATED RDW RBC AUTO: 51.5 FL (ref 35.1–46.3)
DEPRECATED RDW RBC AUTO: 52.2 FL (ref 35.1–46.3)
DEPRECATED RDW RBC AUTO: 52.3 FL (ref 35.1–46.3)
DEPRECATED RDW RBC AUTO: 53 FL (ref 35.1–46.3)
DEPRECATED RDW RBC AUTO: 53.1 FL (ref 35.1–46.3)
DEPRECATED RDW RBC AUTO: 54.1 FL (ref 35.1–46.3)
EOSINOPHIL # BLD AUTO: 0 X10(3) UL (ref 0–0.7)
EOSINOPHIL # BLD AUTO: 0.01 X10(3) UL (ref 0–0.7)
EOSINOPHIL # BLD AUTO: 0.01 X10(3) UL (ref 0–0.7)
EOSINOPHIL # BLD AUTO: 0.02 X10(3) UL (ref 0–0.7)
EOSINOPHIL NFR BLD AUTO: 0 %
EOSINOPHIL NFR BLD AUTO: 0.2 %
EOSINOPHIL NFR BLD AUTO: 0.2 %
EOSINOPHIL NFR BLD AUTO: 0.3 %
EOSINOPHIL NFR BLD AUTO: 0.4 %
ERYTHROCYTE [DISTWIDTH] IN BLOOD BY AUTOMATED COUNT: 14.5 % (ref 11–15)
ERYTHROCYTE [DISTWIDTH] IN BLOOD BY AUTOMATED COUNT: 14.6 % (ref 11–15)
ERYTHROCYTE [DISTWIDTH] IN BLOOD BY AUTOMATED COUNT: 14.7 % (ref 11–15)
ERYTHROCYTE [DISTWIDTH] IN BLOOD BY AUTOMATED COUNT: 14.7 % (ref 11–15)
ERYTHROCYTE [DISTWIDTH] IN BLOOD BY AUTOMATED COUNT: 14.8 % (ref 11–15)
ERYTHROCYTE [DISTWIDTH] IN BLOOD BY AUTOMATED COUNT: 14.8 % (ref 11–15)
ERYTHROCYTE [DISTWIDTH] IN BLOOD BY AUTOMATED COUNT: 14.9 % (ref 11–15)
ERYTHROCYTE [DISTWIDTH] IN BLOOD BY AUTOMATED COUNT: 14.9 % (ref 11–15)
ERYTHROCYTE [DISTWIDTH] IN BLOOD BY AUTOMATED COUNT: 15 % (ref 11–15)
ERYTHROCYTE [DISTWIDTH] IN BLOOD BY AUTOMATED COUNT: 15 % (ref 11–15)
ERYTHROCYTE [DISTWIDTH] IN BLOOD BY AUTOMATED COUNT: 15.1 % (ref 11–15)
ERYTHROCYTE [DISTWIDTH] IN BLOOD BY AUTOMATED COUNT: 15.4 % (ref 11–15)
GLOBULIN PLAS-MCNC: 2.5 G/DL (ref 2.8–4.4)
GLUCOSE BLD-MCNC: 101 MG/DL (ref 70–99)
GLUCOSE BLD-MCNC: 106 MG/DL (ref 70–99)
GLUCOSE BLD-MCNC: 114 MG/DL (ref 70–99)
GLUCOSE BLD-MCNC: 120 MG/DL (ref 70–99)
GLUCOSE BLD-MCNC: 122 MG/DL (ref 70–99)
GLUCOSE BLD-MCNC: 135 MG/DL (ref 70–99)
GLUCOSE BLD-MCNC: 137 MG/DL (ref 70–99)
GLUCOSE BLD-MCNC: 146 MG/DL (ref 70–99)
GLUCOSE BLD-MCNC: 83 MG/DL (ref 70–99)
GLUCOSE BLD-MCNC: 90 MG/DL (ref 70–99)
GLUCOSE BLD-MCNC: 91 MG/DL (ref 70–99)
GLUCOSE BLD-MCNC: 93 MG/DL (ref 70–99)
GLUCOSE BLD-MCNC: 95 MG/DL (ref 70–99)
HAV IGM SER QL: 1.7 MG/DL (ref 1.6–2.6)
HAV IGM SER QL: 1.9 MG/DL (ref 1.6–2.6)
HAV IGM SER QL: 1.9 MG/DL (ref 1.6–2.6)
HAV IGM SER QL: 2.1 MG/DL (ref 1.6–2.6)
HBV SURFACE AG SER-ACNC: <0.1 [IU]/L
HBV SURFACE AG SERPL QL IA: NONREACTIVE
HCT VFR BLD AUTO: 23.1 %
HCT VFR BLD AUTO: 23.8 %
HCT VFR BLD AUTO: 24 %
HCT VFR BLD AUTO: 24.3 %
HCT VFR BLD AUTO: 24.3 %
HCT VFR BLD AUTO: 24.7 %
HCT VFR BLD AUTO: 24.9 %
HCT VFR BLD AUTO: 25.4 %
HCT VFR BLD AUTO: 26.6 %
HCT VFR BLD AUTO: 26.6 %
HCT VFR BLD AUTO: 26.9 %
HCT VFR BLD AUTO: 27.2 %
HCT VFR BLD AUTO: 27.3 %
HCT VFR BLD AUTO: 27.8 %
HCT VFR BLD AUTO: 30.3 %
HCV AB SERPL QL IA: NONREACTIVE
HDLC SERPL-MCNC: 68 MG/DL (ref 40–59)
HGB BLD-MCNC: 7 G/DL
HGB BLD-MCNC: 7.3 G/DL
HGB BLD-MCNC: 7.4 G/DL
HGB BLD-MCNC: 7.5 G/DL
HGB BLD-MCNC: 7.6 G/DL
HGB BLD-MCNC: 7.9 G/DL
HGB BLD-MCNC: 8.2 G/DL
HGB BLD-MCNC: 8.3 G/DL
HGB BLD-MCNC: 8.4 G/DL
HGB BLD-MCNC: 8.5 G/DL
HGB BLD-MCNC: 8.7 G/DL
HGB BLD-MCNC: 9.2 G/DL
HIV 1+2 AB+HIV1 P24 AG SERPL QL IA: NONREACTIVE
IMM GRANULOCYTES # BLD AUTO: 0.04 X10(3) UL (ref 0–1)
IMM GRANULOCYTES # BLD AUTO: 0.04 X10(3) UL (ref 0–1)
IMM GRANULOCYTES # BLD AUTO: 0.05 X10(3) UL (ref 0–1)
IMM GRANULOCYTES # BLD AUTO: 0.06 X10(3) UL (ref 0–1)
IMM GRANULOCYTES # BLD AUTO: 0.07 X10(3) UL (ref 0–1)
IMM GRANULOCYTES # BLD AUTO: 0.09 X10(3) UL (ref 0–1)
IMM GRANULOCYTES # BLD AUTO: 0.2 X10(3) UL (ref 0–1)
IMM GRANULOCYTES NFR BLD: 0.8 %
IMM GRANULOCYTES NFR BLD: 0.9 %
IMM GRANULOCYTES NFR BLD: 1.1 %
IMM GRANULOCYTES NFR BLD: 1.2 %
IMM GRANULOCYTES NFR BLD: 1.3 %
IMM GRANULOCYTES NFR BLD: 1.3 %
IMM GRANULOCYTES NFR BLD: 1.4 %
IMM GRANULOCYTES NFR BLD: 1.4 %
IMM GRANULOCYTES NFR BLD: 1.5 %
IMM GRANULOCYTES NFR BLD: 2.2 %
IRON SATURATION: 11 %
IRON SERPL-MCNC: 31 UG/DL
LDLC SERPL CALC-MCNC: 34 MG/DL (ref ?–100)
LYMPHOCYTES # BLD AUTO: 0.15 X10(3) UL (ref 1–4)
LYMPHOCYTES # BLD AUTO: 0.16 X10(3) UL (ref 1–4)
LYMPHOCYTES # BLD AUTO: 0.17 X10(3) UL (ref 1–4)
LYMPHOCYTES # BLD AUTO: 0.18 X10(3) UL (ref 1–4)
LYMPHOCYTES # BLD AUTO: 0.19 X10(3) UL (ref 1–4)
LYMPHOCYTES # BLD AUTO: 0.21 X10(3) UL (ref 1–4)
LYMPHOCYTES # BLD AUTO: 0.23 X10(3) UL (ref 1–4)
LYMPHOCYTES # BLD AUTO: 0.24 X10(3) UL (ref 1–4)
LYMPHOCYTES # BLD AUTO: 0.26 X10(3) UL (ref 1–4)
LYMPHOCYTES # BLD AUTO: 0.26 X10(3) UL (ref 1–4)
LYMPHOCYTES # BLD AUTO: 0.3 X10(3) UL (ref 1–4)
LYMPHOCYTES # BLD AUTO: 0.3 X10(3) UL (ref 1–4)
LYMPHOCYTES NFR BLD AUTO: 1.8 %
LYMPHOCYTES NFR BLD AUTO: 2.3 %
LYMPHOCYTES NFR BLD AUTO: 2.6 %
LYMPHOCYTES NFR BLD AUTO: 2.8 %
LYMPHOCYTES NFR BLD AUTO: 3.3 %
LYMPHOCYTES NFR BLD AUTO: 3.8 %
LYMPHOCYTES NFR BLD AUTO: 4.2 %
LYMPHOCYTES NFR BLD AUTO: 4.4 %
LYMPHOCYTES NFR BLD AUTO: 4.5 %
LYMPHOCYTES NFR BLD AUTO: 5 %
LYMPHOCYTES NFR BLD AUTO: 5.1 %
LYMPHOCYTES NFR BLD AUTO: 5.1 %
LYMPHOCYTES NFR BLD AUTO: 5.7 %
LYMPHOCYTES NFR BLD AUTO: 6 %
M PROTEIN MFR SERPL ELPH: 5.2 G/DL (ref 6.4–8.2)
MCH RBC QN AUTO: 29 PG (ref 26–34)
MCH RBC QN AUTO: 29.1 PG (ref 26–34)
MCH RBC QN AUTO: 29.2 PG (ref 26–34)
MCH RBC QN AUTO: 29.3 PG (ref 26–34)
MCH RBC QN AUTO: 29.4 PG (ref 26–34)
MCH RBC QN AUTO: 29.6 PG (ref 26–34)
MCH RBC QN AUTO: 29.9 PG (ref 26–34)
MCH RBC QN AUTO: 29.9 PG (ref 26–34)
MCH RBC QN AUTO: 30 PG (ref 26–34)
MCH RBC QN AUTO: 30.1 PG (ref 26–34)
MCHC RBC AUTO-ENTMCNC: 30.1 G/DL (ref 31–37)
MCHC RBC AUTO-ENTMCNC: 30.3 G/DL (ref 31–37)
MCHC RBC AUTO-ENTMCNC: 30.4 G/DL (ref 31–37)
MCHC RBC AUTO-ENTMCNC: 30.4 G/DL (ref 31–37)
MCHC RBC AUTO-ENTMCNC: 30.7 G/DL (ref 31–37)
MCHC RBC AUTO-ENTMCNC: 30.8 G/DL (ref 31–37)
MCHC RBC AUTO-ENTMCNC: 30.8 G/DL (ref 31–37)
MCHC RBC AUTO-ENTMCNC: 30.9 G/DL (ref 31–37)
MCHC RBC AUTO-ENTMCNC: 31.1 G/DL (ref 31–37)
MCHC RBC AUTO-ENTMCNC: 31.1 G/DL (ref 31–37)
MCHC RBC AUTO-ENTMCNC: 31.2 G/DL (ref 31–37)
MCHC RBC AUTO-ENTMCNC: 31.3 G/DL (ref 31–37)
MCHC RBC AUTO-ENTMCNC: 31.3 G/DL (ref 31–37)
MCV RBC AUTO: 93.2 FL
MCV RBC AUTO: 93.3 FL
MCV RBC AUTO: 93.3 FL
MCV RBC AUTO: 94.4 FL
MCV RBC AUTO: 94.7 FL
MCV RBC AUTO: 94.9 FL
MCV RBC AUTO: 95.7 FL
MCV RBC AUTO: 95.7 FL
MCV RBC AUTO: 96 FL
MCV RBC AUTO: 96 FL
MCV RBC AUTO: 96.3 FL
MCV RBC AUTO: 96.5 FL
MCV RBC AUTO: 96.6 FL
MCV RBC AUTO: 97.1 FL
MCV RBC AUTO: 99 FL
MONOCYTES # BLD AUTO: 0.08 X10(3) UL (ref 0.1–1)
MONOCYTES # BLD AUTO: 0.31 X10(3) UL (ref 0.1–1)
MONOCYTES # BLD AUTO: 0.33 X10(3) UL (ref 0.1–1)
MONOCYTES # BLD AUTO: 0.36 X10(3) UL (ref 0.1–1)
MONOCYTES # BLD AUTO: 0.39 X10(3) UL (ref 0.1–1)
MONOCYTES # BLD AUTO: 0.41 X10(3) UL (ref 0.1–1)
MONOCYTES # BLD AUTO: 0.48 X10(3) UL (ref 0.1–1)
MONOCYTES # BLD AUTO: 0.52 X10(3) UL (ref 0.1–1)
MONOCYTES # BLD AUTO: 0.54 X10(3) UL (ref 0.1–1)
MONOCYTES # BLD AUTO: 0.54 X10(3) UL (ref 0.1–1)
MONOCYTES # BLD AUTO: 0.55 X10(3) UL (ref 0.1–1)
MONOCYTES # BLD AUTO: 0.6 X10(3) UL (ref 0.1–1)
MONOCYTES # BLD AUTO: 0.68 X10(3) UL (ref 0.1–1)
MONOCYTES # BLD AUTO: 0.72 X10(3) UL (ref 0.1–1)
MONOCYTES NFR BLD AUTO: 10.6 %
MONOCYTES NFR BLD AUTO: 11.6 %
MONOCYTES NFR BLD AUTO: 11.8 %
MONOCYTES NFR BLD AUTO: 13.6 %
MONOCYTES NFR BLD AUTO: 14 %
MONOCYTES NFR BLD AUTO: 2.2 %
MONOCYTES NFR BLD AUTO: 3.3 %
MONOCYTES NFR BLD AUTO: 5 %
MONOCYTES NFR BLD AUTO: 6 %
MONOCYTES NFR BLD AUTO: 6.9 %
MONOCYTES NFR BLD AUTO: 7.7 %
MONOCYTES NFR BLD AUTO: 8.2 %
MONOCYTES NFR BLD AUTO: 9.4 %
MONOCYTES NFR BLD AUTO: 9.9 %
NEUTROPHILS # BLD AUTO: 3.31 X10 (3) UL (ref 1.5–7.7)
NEUTROPHILS # BLD AUTO: 3.31 X10(3) UL (ref 1.5–7.7)
NEUTROPHILS # BLD AUTO: 3.76 X10 (3) UL (ref 1.5–7.7)
NEUTROPHILS # BLD AUTO: 3.76 X10(3) UL (ref 1.5–7.7)
NEUTROPHILS # BLD AUTO: 3.81 X10 (3) UL (ref 1.5–7.7)
NEUTROPHILS # BLD AUTO: 3.81 X10(3) UL (ref 1.5–7.7)
NEUTROPHILS # BLD AUTO: 3.84 X10 (3) UL (ref 1.5–7.7)
NEUTROPHILS # BLD AUTO: 3.84 X10(3) UL (ref 1.5–7.7)
NEUTROPHILS # BLD AUTO: 3.95 X10 (3) UL (ref 1.5–7.7)
NEUTROPHILS # BLD AUTO: 3.95 X10(3) UL (ref 1.5–7.7)
NEUTROPHILS # BLD AUTO: 4.04 X10 (3) UL (ref 1.5–7.7)
NEUTROPHILS # BLD AUTO: 4.04 X10(3) UL (ref 1.5–7.7)
NEUTROPHILS # BLD AUTO: 4.11 X10 (3) UL (ref 1.5–7.7)
NEUTROPHILS # BLD AUTO: 4.11 X10(3) UL (ref 1.5–7.7)
NEUTROPHILS # BLD AUTO: 4.34 X10 (3) UL (ref 1.5–7.7)
NEUTROPHILS # BLD AUTO: 4.34 X10(3) UL (ref 1.5–7.7)
NEUTROPHILS # BLD AUTO: 4.52 X10 (3) UL (ref 1.5–7.7)
NEUTROPHILS # BLD AUTO: 4.52 X10(3) UL (ref 1.5–7.7)
NEUTROPHILS # BLD AUTO: 5.5 X10 (3) UL (ref 1.5–7.7)
NEUTROPHILS # BLD AUTO: 5.5 X10(3) UL (ref 1.5–7.7)
NEUTROPHILS # BLD AUTO: 6.08 X10 (3) UL (ref 1.5–7.7)
NEUTROPHILS # BLD AUTO: 6.08 X10(3) UL (ref 1.5–7.7)
NEUTROPHILS # BLD AUTO: 6.17 X10 (3) UL (ref 1.5–7.7)
NEUTROPHILS # BLD AUTO: 6.17 X10 (3) UL (ref 1.5–7.7)
NEUTROPHILS # BLD AUTO: 6.17 X10(3) UL (ref 1.5–7.7)
NEUTROPHILS # BLD AUTO: 6.17 X10(3) UL (ref 1.5–7.7)
NEUTROPHILS # BLD AUTO: 8.59 X10 (3) UL (ref 1.5–7.7)
NEUTROPHILS # BLD AUTO: 8.59 X10(3) UL (ref 1.5–7.7)
NEUTROPHILS NFR BLD AUTO: 78.7 %
NEUTROPHILS NFR BLD AUTO: 79 %
NEUTROPHILS NFR BLD AUTO: 81.6 %
NEUTROPHILS NFR BLD AUTO: 82 %
NEUTROPHILS NFR BLD AUTO: 83 %
NEUTROPHILS NFR BLD AUTO: 83.5 %
NEUTROPHILS NFR BLD AUTO: 86.1 %
NEUTROPHILS NFR BLD AUTO: 86.4 %
NEUTROPHILS NFR BLD AUTO: 86.7 %
NEUTROPHILS NFR BLD AUTO: 88.1 %
NEUTROPHILS NFR BLD AUTO: 89.7 %
NEUTROPHILS NFR BLD AUTO: 91.7 %
NEUTROPHILS NFR BLD AUTO: 91.7 %
NEUTROPHILS NFR BLD AUTO: 92.5 %
NONHDLC SERPL-MCNC: 50 MG/DL (ref ?–130)
NT-PROBNP SERPL-MCNC: 9858 PG/ML (ref ?–450)
OSMOLALITY SERPL CALC.SUM OF ELEC: 305 MOSM/KG (ref 275–295)
OSMOLALITY SERPL CALC.SUM OF ELEC: 306 MOSM/KG (ref 275–295)
OSMOLALITY SERPL CALC.SUM OF ELEC: 307 MOSM/KG (ref 275–295)
OSMOLALITY SERPL CALC.SUM OF ELEC: 307 MOSM/KG (ref 275–295)
OSMOLALITY SERPL CALC.SUM OF ELEC: 309 MOSM/KG (ref 275–295)
OSMOLALITY SERPL CALC.SUM OF ELEC: 309 MOSM/KG (ref 275–295)
OSMOLALITY SERPL CALC.SUM OF ELEC: 310 MOSM/KG (ref 275–295)
OSMOLALITY SERPL CALC.SUM OF ELEC: 312 MOSM/KG (ref 275–295)
OSMOLALITY SERPL CALC.SUM OF ELEC: 313 MOSM/KG (ref 275–295)
OSMOLALITY SERPL CALC.SUM OF ELEC: 313 MOSM/KG (ref 275–295)
OSMOLALITY SERPL CALC.SUM OF ELEC: 318 MOSM/KG (ref 275–295)
OSMOLALITY SERPL CALC.SUM OF ELEC: 318 MOSM/KG (ref 275–295)
OSMOLALITY SERPL CALC.SUM OF ELEC: 322 MOSM/KG (ref 275–295)
OSMOLALITY SERPL CALC.SUM OF ELEC: 324 MOSM/KG (ref 275–295)
OSMOLALITY SERPL CALC.SUM OF ELEC: 326 MOSM/KG (ref 275–295)
PATIENT FASTING Y/N/NP: YES
PHOSPHATE SERPL-MCNC: 3.1 MG/DL (ref 2.5–4.9)
PHOSPHATE SERPL-MCNC: 3.2 MG/DL (ref 2.5–4.9)
PHOSPHATE SERPL-MCNC: 3.3 MG/DL (ref 2.5–4.9)
PHOSPHATE SERPL-MCNC: 3.4 MG/DL (ref 2.5–4.9)
PHOSPHATE SERPL-MCNC: 3.5 MG/DL (ref 2.5–4.9)
PHOSPHATE SERPL-MCNC: 3.7 MG/DL (ref 2.5–4.9)
PHOSPHATE SERPL-MCNC: 4.1 MG/DL (ref 2.5–4.9)
PHOSPHATE SERPL-MCNC: 4.7 MG/DL (ref 2.5–4.9)
PHOSPHATE SERPL-MCNC: 5 MG/DL (ref 2.5–4.9)
PLATELET # BLD AUTO: 112 10(3)UL (ref 150–450)
PLATELET # BLD AUTO: 115 10(3)UL (ref 150–450)
PLATELET # BLD AUTO: 121 10(3)UL (ref 150–450)
PLATELET # BLD AUTO: 122 10(3)UL (ref 150–450)
PLATELET # BLD AUTO: 122 10(3)UL (ref 150–450)
PLATELET # BLD AUTO: 124 10(3)UL (ref 150–450)
PLATELET # BLD AUTO: 124 10(3)UL (ref 150–450)
PLATELET # BLD AUTO: 146 10(3)UL (ref 150–450)
PLATELET # BLD AUTO: 165 10(3)UL (ref 150–450)
PLATELET # BLD AUTO: 172 10(3)UL (ref 150–450)
PLATELET # BLD AUTO: 177 10(3)UL (ref 150–450)
PLATELET # BLD AUTO: 194 10(3)UL (ref 150–450)
PLATELET # BLD AUTO: 216 10(3)UL (ref 150–450)
PLATELET # BLD AUTO: 216 10(3)UL (ref 150–450)
PLATELET # BLD AUTO: 228 10(3)UL (ref 150–450)
POTASSIUM SERPL-SCNC: 3.4 MMOL/L (ref 3.5–5.1)
POTASSIUM SERPL-SCNC: 3.5 MMOL/L (ref 3.5–5.1)
POTASSIUM SERPL-SCNC: 3.6 MMOL/L (ref 3.5–5.1)
POTASSIUM SERPL-SCNC: 3.6 MMOL/L (ref 3.5–5.1)
POTASSIUM SERPL-SCNC: 3.7 MMOL/L (ref 3.5–5.1)
POTASSIUM SERPL-SCNC: 3.8 MMOL/L (ref 3.5–5.1)
POTASSIUM SERPL-SCNC: 3.8 MMOL/L (ref 3.5–5.1)
POTASSIUM SERPL-SCNC: 3.9 MMOL/L (ref 3.5–5.1)
POTASSIUM SERPL-SCNC: 4 MMOL/L (ref 3.5–5.1)
POTASSIUM SERPL-SCNC: 4.3 MMOL/L (ref 3.5–5.1)
POTASSIUM SERPL-SCNC: 4.4 MMOL/L (ref 3.5–5.1)
POTASSIUM SERPL-SCNC: 4.4 MMOL/L (ref 3.5–5.1)
POTASSIUM SERPL-SCNC: 4.5 MMOL/L (ref 3.5–5.1)
RBC # BLD AUTO: 2.4 X10(6)UL
RBC # BLD AUTO: 2.48 X10(6)UL
RBC # BLD AUTO: 2.5 X10(6)UL
RBC # BLD AUTO: 2.54 X10(6)UL
RBC # BLD AUTO: 2.56 X10(6)UL
RBC # BLD AUTO: 2.58 X10(6)UL
RBC # BLD AUTO: 2.58 X10(6)UL
RBC # BLD AUTO: 2.63 X10(6)UL
RBC # BLD AUTO: 2.74 X10(6)UL
RBC # BLD AUTO: 2.84 X10(6)UL
RBC # BLD AUTO: 2.85 X10(6)UL
RBC # BLD AUTO: 2.88 X10(6)UL
RBC # BLD AUTO: 2.93 X10(6)UL
RBC # BLD AUTO: 2.98 X10(6)UL
RBC # BLD AUTO: 3.06 X10(6)UL
RH BLOOD TYPE: POSITIVE
RH BLOOD TYPE: POSITIVE
SARS-COV-2 RNA RESP QL NAA+PROBE: NOT DETECTED
SARS-COV-2 RNA RESP QL NAA+PROBE: NOT DETECTED
SODIUM SERPL-SCNC: 137 MMOL/L (ref 136–145)
SODIUM SERPL-SCNC: 138 MMOL/L (ref 136–145)
SODIUM SERPL-SCNC: 139 MMOL/L (ref 136–145)
SODIUM SERPL-SCNC: 140 MMOL/L (ref 136–145)
SODIUM SERPL-SCNC: 140 MMOL/L (ref 136–145)
SODIUM SERPL-SCNC: 141 MMOL/L (ref 136–145)
SODIUM SERPL-SCNC: 141 MMOL/L (ref 136–145)
SODIUM SERPL-SCNC: 142 MMOL/L (ref 136–145)
SODIUM SERPL-SCNC: 143 MMOL/L (ref 136–145)
SODIUM SERPL-SCNC: 143 MMOL/L (ref 136–145)
TOTAL IRON BINDING CAPACITY: 273 UG/DL (ref 240–450)
TRANSFERRIN SERPL-MCNC: 183 MG/DL (ref 200–360)
TRIGL SERPL-MCNC: 78 MG/DL (ref 30–149)
TROPONIN I SERPL-MCNC: 0.09 NG/ML (ref ?–0.04)
TROPONIN I SERPL-MCNC: 0.1 NG/ML (ref ?–0.04)
TROPONIN I SERPL-MCNC: 0.12 NG/ML (ref ?–0.04)
TROPONIN I SERPL-MCNC: 0.12 NG/ML (ref ?–0.04)
URATE SERPL-MCNC: 12.7 MG/DL
URATE SERPL-MCNC: 12.9 MG/DL
VLDLC SERPL CALC-MCNC: 16 MG/DL (ref 0–30)
WBC # BLD AUTO: 3.6 X10(3) UL (ref 4–11)
WBC # BLD AUTO: 4.5 X10(3) UL (ref 4–11)
WBC # BLD AUTO: 4.7 X10(3) UL (ref 4–11)
WBC # BLD AUTO: 4.7 X10(3) UL (ref 4–11)
WBC # BLD AUTO: 4.8 X10(3) UL (ref 4–11)
WBC # BLD AUTO: 5 X10(3) UL (ref 4–11)
WBC # BLD AUTO: 5.1 X10(3) UL (ref 4–11)
WBC # BLD AUTO: 5.2 X10(3) UL (ref 4–11)
WBC # BLD AUTO: 5.2 X10(3) UL (ref 4–11)
WBC # BLD AUTO: 5.5 X10(3) UL (ref 4–11)
WBC # BLD AUTO: 6.4 X10(3) UL (ref 4–11)
WBC # BLD AUTO: 6.6 X10(3) UL (ref 4–11)
WBC # BLD AUTO: 6.9 X10(3) UL (ref 4–11)
WBC # BLD AUTO: 7 X10(3) UL (ref 4–11)
WBC # BLD AUTO: 9.3 X10(3) UL (ref 4–11)

## 2021-01-01 PROCEDURE — 99232 SBSQ HOSP IP/OBS MODERATE 35: CPT | Performed by: NURSE PRACTITIONER

## 2021-01-01 PROCEDURE — 99222 1ST HOSP IP/OBS MODERATE 55: CPT | Performed by: INTERNAL MEDICINE

## 2021-01-01 PROCEDURE — 99233 SBSQ HOSP IP/OBS HIGH 50: CPT | Performed by: HOSPITALIST

## 2021-01-01 PROCEDURE — 99232 SBSQ HOSP IP/OBS MODERATE 35: CPT | Performed by: INTERNAL MEDICINE

## 2021-01-01 PROCEDURE — 73110 X-RAY EXAM OF WRIST: CPT | Performed by: HOSPITALIST

## 2021-01-01 PROCEDURE — 0011A SARSCOV2 VAC 100MCG/0.5ML IM: CPT

## 2021-01-01 PROCEDURE — 83735 ASSAY OF MAGNESIUM: CPT

## 2021-01-01 PROCEDURE — 99232 SBSQ HOSP IP/OBS MODERATE 35: CPT | Performed by: HOSPITALIST

## 2021-01-01 PROCEDURE — 71045 X-RAY EXAM CHEST 1 VIEW: CPT | Performed by: EMERGENCY MEDICINE

## 2021-01-01 PROCEDURE — 76770 US EXAM ABDO BACK WALL COMP: CPT | Performed by: INTERNAL MEDICINE

## 2021-01-01 PROCEDURE — 99232 SBSQ HOSP IP/OBS MODERATE 35: CPT | Performed by: PHYSICIAN ASSISTANT

## 2021-01-01 PROCEDURE — 99233 SBSQ HOSP IP/OBS HIGH 50: CPT | Performed by: INTERNAL MEDICINE

## 2021-01-01 PROCEDURE — 85007 BL SMEAR W/DIFF WBC COUNT: CPT

## 2021-01-01 PROCEDURE — 36415 COLL VENOUS BLD VENIPUNCTURE: CPT

## 2021-01-01 PROCEDURE — 99223 1ST HOSP IP/OBS HIGH 75: CPT | Performed by: HOSPITALIST

## 2021-01-01 PROCEDURE — 99239 HOSP IP/OBS DSCHRG MGMT >30: CPT | Performed by: HOSPITALIST

## 2021-01-01 PROCEDURE — 0DB58ZX EXCISION OF ESOPHAGUS, VIA NATURAL OR ARTIFICIAL OPENING ENDOSCOPIC, DIAGNOSTIC: ICD-10-PCS | Performed by: INTERNAL MEDICINE

## 2021-01-01 PROCEDURE — 43239 EGD BIOPSY SINGLE/MULTIPLE: CPT | Performed by: INTERNAL MEDICINE

## 2021-01-01 PROCEDURE — 93306 TTE W/DOPPLER COMPLETE: CPT | Performed by: NURSE PRACTITIONER

## 2021-01-01 PROCEDURE — 83735 ASSAY OF MAGNESIUM: CPT | Performed by: INTERNAL MEDICINE

## 2021-01-01 PROCEDURE — 80048 BASIC METABOLIC PNL TOTAL CA: CPT

## 2021-01-01 PROCEDURE — 0TJB8ZZ INSPECTION OF BLADDER, VIA NATURAL OR ARTIFICIAL OPENING ENDOSCOPIC: ICD-10-PCS | Performed by: UROLOGY

## 2021-01-01 PROCEDURE — 20610 DRAIN/INJ JOINT/BURSA W/O US: CPT | Performed by: INTERNAL MEDICINE

## 2021-01-01 PROCEDURE — 80053 COMPREHEN METABOLIC PANEL: CPT | Performed by: INTERNAL MEDICINE

## 2021-01-01 PROCEDURE — 85027 COMPLETE CBC AUTOMATED: CPT

## 2021-01-01 PROCEDURE — 0T9B30Z DRAINAGE OF BLADDER WITH DRAINAGE DEVICE, PERCUTANEOUS APPROACH: ICD-10-PCS | Performed by: UROLOGY

## 2021-01-01 PROCEDURE — 73130 X-RAY EXAM OF HAND: CPT | Performed by: HOSPITALIST

## 2021-01-01 PROCEDURE — 85025 COMPLETE CBC W/AUTO DIFF WBC: CPT

## 2021-01-01 PROCEDURE — 73218 MRI UPPER EXTREMITY W/O DYE: CPT | Performed by: INTERNAL MEDICINE

## 2021-01-01 PROCEDURE — 85025 COMPLETE CBC W/AUTO DIFF WBC: CPT | Performed by: INTERNAL MEDICINE

## 2021-01-01 PROCEDURE — 99214 OFFICE O/P EST MOD 30 MIN: CPT | Performed by: INTERNAL MEDICINE

## 2021-01-01 PROCEDURE — 84443 ASSAY THYROID STIM HORMONE: CPT

## 2021-01-01 PROCEDURE — 30233N1 TRANSFUSION OF NONAUTOLOGOUS RED BLOOD CELLS INTO PERIPHERAL VEIN, PERCUTANEOUS APPROACH: ICD-10-PCS | Performed by: HOSPITALIST

## 2021-01-01 PROCEDURE — 99233 SBSQ HOSP IP/OBS HIGH 50: CPT | Performed by: PHYSICIAN ASSISTANT

## 2021-01-01 PROCEDURE — 0DB68ZX EXCISION OF STOMACH, VIA NATURAL OR ARTIFICIAL OPENING ENDOSCOPIC, DIAGNOSTIC: ICD-10-PCS | Performed by: INTERNAL MEDICINE

## 2021-01-01 PROCEDURE — 1111F DSCHRG MED/CURRENT MED MERGE: CPT

## 2021-01-01 PROCEDURE — 20605 DRAIN/INJ JOINT/BURSA W/O US: CPT | Performed by: INTERNAL MEDICINE

## 2021-01-01 PROCEDURE — 80061 LIPID PANEL: CPT

## 2021-01-01 PROCEDURE — 73110 X-RAY EXAM OF WRIST: CPT | Performed by: NURSE PRACTITIONER

## 2021-01-01 PROCEDURE — 0012A SARSCOV2 VAC 100MCG/0.5ML IM: CPT

## 2021-01-01 PROCEDURE — 73502 X-RAY EXAM HIP UNI 2-3 VIEWS: CPT | Performed by: HOSPITALIST

## 2021-01-01 PROCEDURE — 99223 1ST HOSP IP/OBS HIGH 75: CPT | Performed by: INTERNAL MEDICINE

## 2021-01-01 PROCEDURE — 80048 BASIC METABOLIC PNL TOTAL CA: CPT | Performed by: INTERNAL MEDICINE

## 2021-01-01 PROCEDURE — 1111F DSCHRG MED/CURRENT MED MERGE: CPT | Performed by: INTERNAL MEDICINE

## 2021-01-01 PROCEDURE — 73560 X-RAY EXAM OF KNEE 1 OR 2: CPT | Performed by: HOSPITALIST

## 2021-01-01 PROCEDURE — 80053 COMPREHEN METABOLIC PANEL: CPT

## 2021-01-01 PROCEDURE — G0439 PPPS, SUBSEQ VISIT: HCPCS | Performed by: INTERNAL MEDICINE

## 2021-01-01 RX ORDER — HYDROCODONE BITARTRATE AND ACETAMINOPHEN 7.5; 325 MG/1; MG/1
2 TABLET ORAL EVERY 4 HOURS PRN
Status: DISCONTINUED | OUTPATIENT
Start: 2021-01-01 | End: 2021-01-01

## 2021-01-01 RX ORDER — HYDROCORTISONE 25 MG/G
1 CREAM TOPICAL 2 TIMES DAILY
Status: DISCONTINUED | OUTPATIENT
Start: 2021-01-01 | End: 2021-01-01

## 2021-01-01 RX ORDER — AMLODIPINE BESYLATE 5 MG/1
5 TABLET ORAL DAILY
Qty: 90 TABLET | Refills: 3 | Status: ON HOLD | OUTPATIENT
Start: 2021-01-01 | End: 2021-01-01

## 2021-01-01 RX ORDER — ISOSORBIDE DINITRATE 10 MG/1
10 TABLET ORAL
Qty: 60 TABLET | Refills: 2 | Status: ON HOLD | OUTPATIENT
Start: 2021-01-01 | End: 2021-01-01

## 2021-01-01 RX ORDER — LIDOCAINE HYDROCHLORIDE 10 MG/ML
INJECTION, SOLUTION EPIDURAL; INFILTRATION; INTRACAUDAL; PERINEURAL AS NEEDED
Status: DISCONTINUED | OUTPATIENT
Start: 2021-01-01 | End: 2021-01-01 | Stop reason: HOSPADM

## 2021-01-01 RX ORDER — HYDRALAZINE HYDROCHLORIDE 10 MG/1
10 TABLET, FILM COATED ORAL EVERY 8 HOURS SCHEDULED
Status: DISCONTINUED | OUTPATIENT
Start: 2021-01-01 | End: 2021-01-01

## 2021-01-01 RX ORDER — NITROGLYCERIN 0.4 MG/1
0.4 TABLET SUBLINGUAL EVERY 5 MIN PRN
Status: DISCONTINUED | OUTPATIENT
Start: 2021-01-01 | End: 2021-01-01

## 2021-01-01 RX ORDER — METHYLPREDNISOLONE SODIUM SUCCINATE 40 MG/ML
40 INJECTION, POWDER, LYOPHILIZED, FOR SOLUTION INTRAMUSCULAR; INTRAVENOUS EVERY 12 HOURS
Status: DISCONTINUED | OUTPATIENT
Start: 2021-01-01 | End: 2021-01-01

## 2021-01-01 RX ORDER — MORPHINE SULFATE 10 MG/ML
6 INJECTION, SOLUTION INTRAMUSCULAR; INTRAVENOUS EVERY 10 MIN PRN
Status: DISCONTINUED | OUTPATIENT
Start: 2021-01-01 | End: 2021-01-01 | Stop reason: HOSPADM

## 2021-01-01 RX ORDER — FUROSEMIDE 10 MG/ML
40 INJECTION INTRAMUSCULAR; INTRAVENOUS ONCE
Status: COMPLETED | OUTPATIENT
Start: 2021-01-01 | End: 2021-01-01

## 2021-01-01 RX ORDER — TRIAMCINOLONE ACETONIDE 40 MG/ML
40 INJECTION, SUSPENSION INTRA-ARTICULAR; INTRAMUSCULAR ONCE
Status: COMPLETED | OUTPATIENT
Start: 2021-01-01 | End: 2021-01-01

## 2021-01-01 RX ORDER — COLCHICINE 0.6 MG/1
0.6 TABLET ORAL 2 TIMES DAILY
Status: COMPLETED | OUTPATIENT
Start: 2021-01-01 | End: 2021-01-01

## 2021-01-01 RX ORDER — TRAMADOL HYDROCHLORIDE 50 MG/1
50 TABLET ORAL EVERY 6 HOURS PRN
Status: DISCONTINUED | OUTPATIENT
Start: 2021-01-01 | End: 2021-01-01

## 2021-01-01 RX ORDER — LABETALOL 200 MG/1
100 TABLET, FILM COATED ORAL 2 TIMES DAILY
Status: DISCONTINUED | OUTPATIENT
Start: 2021-01-01 | End: 2021-01-01

## 2021-01-01 RX ORDER — METOPROLOL TARTRATE 5 MG/5ML
2.5 INJECTION INTRAVENOUS ONCE
Status: DISCONTINUED | OUTPATIENT
Start: 2021-01-01 | End: 2021-01-01 | Stop reason: HOSPADM

## 2021-01-01 RX ORDER — TRIAMCINOLONE ACETONIDE 40 MG/ML
40 INJECTION, SUSPENSION INTRA-ARTICULAR; INTRAMUSCULAR ONCE
Status: DISCONTINUED | OUTPATIENT
Start: 2021-01-01 | End: 2021-01-01

## 2021-01-01 RX ORDER — MELATONIN
325
Status: DISCONTINUED | OUTPATIENT
Start: 2021-01-01 | End: 2021-01-01

## 2021-01-01 RX ORDER — MELATONIN
400 DAILY
Status: DISCONTINUED | OUTPATIENT
Start: 2021-01-01 | End: 2021-01-01

## 2021-01-01 RX ORDER — METHYLPREDNISOLONE SODIUM SUCCINATE 125 MG/2ML
125 INJECTION, POWDER, LYOPHILIZED, FOR SOLUTION INTRAMUSCULAR; INTRAVENOUS ONCE
Status: DISCONTINUED | OUTPATIENT
Start: 2021-01-01 | End: 2021-01-01

## 2021-01-01 RX ORDER — HYDROCODONE BITARTRATE AND ACETAMINOPHEN 7.5; 325 MG/1; MG/1
1 TABLET ORAL EVERY 4 HOURS PRN
Status: DISCONTINUED | OUTPATIENT
Start: 2021-01-01 | End: 2021-01-01

## 2021-01-01 RX ORDER — FUROSEMIDE 10 MG/ML
40 INJECTION INTRAMUSCULAR; INTRAVENOUS
Status: DISCONTINUED | OUTPATIENT
Start: 2021-01-01 | End: 2021-01-01

## 2021-01-01 RX ORDER — FUROSEMIDE 40 MG/1
40 TABLET ORAL EVERY OTHER DAY
Qty: 30 TABLET | Refills: 0 | Status: ON HOLD | OUTPATIENT
Start: 2021-01-01 | End: 2021-01-01

## 2021-01-01 RX ORDER — FOLIC ACID 1 MG/1
1 TABLET ORAL DAILY
Status: DISCONTINUED | OUTPATIENT
Start: 2021-01-01 | End: 2021-01-01

## 2021-01-01 RX ORDER — ISOSORBIDE DINITRATE 10 MG/1
10 TABLET ORAL
Status: DISCONTINUED | OUTPATIENT
Start: 2021-01-01 | End: 2021-01-01

## 2021-01-01 RX ORDER — VANCOMYCIN 2 GRAM/500 ML IN 0.9 % SODIUM CHLORIDE INTRAVENOUS
25 ONCE
Status: COMPLETED | OUTPATIENT
Start: 2021-01-01 | End: 2021-01-01

## 2021-01-01 RX ORDER — HYDROCODONE BITARTRATE AND ACETAMINOPHEN 7.5; 325 MG/1; MG/1
1-2 TABLET ORAL EVERY 4 HOURS PRN
Qty: 30 TABLET | Refills: 0 | Status: SHIPPED | OUTPATIENT
Start: 2021-01-01

## 2021-01-01 RX ORDER — POLYETHYLENE GLYCOL 3350 17 G/17G
17 POWDER, FOR SOLUTION ORAL DAILY PRN
Status: DISCONTINUED | OUTPATIENT
Start: 2021-01-01 | End: 2021-01-01

## 2021-01-01 RX ORDER — HYDROMORPHONE HYDROCHLORIDE 1 MG/ML
0.4 INJECTION, SOLUTION INTRAMUSCULAR; INTRAVENOUS; SUBCUTANEOUS EVERY 5 MIN PRN
Status: DISCONTINUED | OUTPATIENT
Start: 2021-01-01 | End: 2021-01-01 | Stop reason: HOSPADM

## 2021-01-01 RX ORDER — CARVEDILOL 6.25 MG/1
6.25 TABLET ORAL 2 TIMES DAILY WITH MEALS
Status: DISCONTINUED | OUTPATIENT
Start: 2021-01-01 | End: 2021-01-01

## 2021-01-01 RX ORDER — ROPINIROLE 0.5 MG/1
0.5 TABLET, FILM COATED ORAL NIGHTLY
Qty: 60 TABLET | Refills: 3 | Status: SHIPPED | OUTPATIENT
Start: 2021-01-01 | End: 2021-01-01

## 2021-01-01 RX ORDER — POTASSIUM CHLORIDE 20 MEQ/1
40 TABLET, EXTENDED RELEASE ORAL ONCE
Status: COMPLETED | OUTPATIENT
Start: 2021-01-01 | End: 2021-01-01

## 2021-01-01 RX ORDER — ONDANSETRON 2 MG/ML
4 INJECTION INTRAMUSCULAR; INTRAVENOUS EVERY 6 HOURS PRN
Status: DISCONTINUED | OUTPATIENT
Start: 2021-01-01 | End: 2021-01-01

## 2021-01-01 RX ORDER — BUMETANIDE 0.25 MG/ML
1 INJECTION, SOLUTION INTRAMUSCULAR; INTRAVENOUS
Status: DISCONTINUED | OUTPATIENT
Start: 2021-01-01 | End: 2021-01-01

## 2021-01-01 RX ORDER — MORPHINE SULFATE 4 MG/ML
2 INJECTION, SOLUTION INTRAMUSCULAR; INTRAVENOUS EVERY 10 MIN PRN
Status: DISCONTINUED | OUTPATIENT
Start: 2021-01-01 | End: 2021-01-01 | Stop reason: HOSPADM

## 2021-01-01 RX ORDER — ISOSORBIDE MONONITRATE 30 MG/1
30 TABLET, EXTENDED RELEASE ORAL DAILY
Status: DISCONTINUED | OUTPATIENT
Start: 2021-01-01 | End: 2021-01-01

## 2021-01-01 RX ORDER — CARVEDILOL 3.12 MG/1
3.12 TABLET ORAL 2 TIMES DAILY WITH MEALS
Qty: 60 TABLET | Refills: 2 | Status: SHIPPED | OUTPATIENT
Start: 2021-01-01 | End: 2021-01-01

## 2021-01-01 RX ORDER — MAGNESIUM OXIDE 400 MG (241.3 MG MAGNESIUM) TABLET
800 TABLET ONCE
Status: COMPLETED | OUTPATIENT
Start: 2021-01-01 | End: 2021-01-01

## 2021-01-01 RX ORDER — CARVEDILOL 3.12 MG/1
3.12 TABLET ORAL 2 TIMES DAILY WITH MEALS
Status: DISCONTINUED | OUTPATIENT
Start: 2021-01-01 | End: 2021-01-01

## 2021-01-01 RX ORDER — PREDNISONE 20 MG/1
60 TABLET ORAL
Status: DISCONTINUED | OUTPATIENT
Start: 2021-01-01 | End: 2021-01-01

## 2021-01-01 RX ORDER — METHYLPREDNISOLONE SODIUM SUCCINATE 40 MG/ML
40 INJECTION, POWDER, LYOPHILIZED, FOR SOLUTION INTRAMUSCULAR; INTRAVENOUS EVERY 8 HOURS
Status: COMPLETED | OUTPATIENT
Start: 2021-01-01 | End: 2021-01-01

## 2021-01-01 RX ORDER — LABETALOL 200 MG/1
200 TABLET, FILM COATED ORAL 2 TIMES DAILY
Status: DISCONTINUED | OUTPATIENT
Start: 2021-01-01 | End: 2021-01-01

## 2021-01-01 RX ORDER — TAMSULOSIN HYDROCHLORIDE 0.4 MG/1
0.4 CAPSULE ORAL DAILY
Status: DISCONTINUED | OUTPATIENT
Start: 2021-01-01 | End: 2021-01-01

## 2021-01-01 RX ORDER — CARVEDILOL 3.12 MG/1
3.12 TABLET ORAL 2 TIMES DAILY WITH MEALS
Qty: 60 TABLET | Refills: 2 | Status: SHIPPED | OUTPATIENT
Start: 2021-01-01

## 2021-01-01 RX ORDER — ISOSORBIDE DINITRATE 5 MG/1
5 TABLET ORAL
Status: DISCONTINUED | OUTPATIENT
Start: 2021-01-01 | End: 2021-01-01

## 2021-01-01 RX ORDER — METOCLOPRAMIDE HYDROCHLORIDE 5 MG/ML
5 INJECTION INTRAMUSCULAR; INTRAVENOUS EVERY 8 HOURS PRN
Status: DISCONTINUED | OUTPATIENT
Start: 2021-01-01 | End: 2021-01-01

## 2021-01-01 RX ORDER — CARVEDILOL 12.5 MG/1
12.5 TABLET ORAL 2 TIMES DAILY WITH MEALS
Status: DISCONTINUED | OUTPATIENT
Start: 2021-01-01 | End: 2021-01-01

## 2021-01-01 RX ORDER — HYDROCORTISONE 25 MG/G
1 CREAM TOPICAL 2 TIMES DAILY
Qty: 1 TUBE | Refills: 3 | Status: SHIPPED | OUTPATIENT
Start: 2021-01-01

## 2021-01-01 RX ORDER — PANTOPRAZOLE SODIUM 40 MG/1
40 TABLET, DELAYED RELEASE ORAL
Qty: 90 TABLET | Refills: 3 | Status: SHIPPED | OUTPATIENT
Start: 2021-01-01

## 2021-01-01 RX ORDER — ISOSORBIDE DINITRATE 20 MG/1
20 TABLET ORAL
Qty: 90 TABLET | Refills: 2 | Status: SHIPPED | OUTPATIENT
Start: 2021-01-01

## 2021-01-01 RX ORDER — MORPHINE SULFATE 2 MG/ML
2 INJECTION, SOLUTION INTRAMUSCULAR; INTRAVENOUS EVERY 4 HOURS PRN
Status: DISCONTINUED | OUTPATIENT
Start: 2021-01-01 | End: 2021-01-01

## 2021-01-01 RX ORDER — TRAMADOL HYDROCHLORIDE 50 MG/1
50 TABLET ORAL EVERY 12 HOURS PRN
Status: DISCONTINUED | OUTPATIENT
Start: 2021-01-01 | End: 2021-01-01

## 2021-01-01 RX ORDER — NALOXONE HYDROCHLORIDE 0.4 MG/ML
80 INJECTION, SOLUTION INTRAMUSCULAR; INTRAVENOUS; SUBCUTANEOUS AS NEEDED
Status: DISCONTINUED | OUTPATIENT
Start: 2021-01-01 | End: 2021-01-01 | Stop reason: HOSPADM

## 2021-01-01 RX ORDER — POTASSIUM CHLORIDE 20 MEQ/1
20 TABLET, EXTENDED RELEASE ORAL ONCE
Status: COMPLETED | OUTPATIENT
Start: 2021-01-01 | End: 2021-01-01

## 2021-01-01 RX ORDER — MORPHINE SULFATE 4 MG/ML
4 INJECTION, SOLUTION INTRAMUSCULAR; INTRAVENOUS EVERY 10 MIN PRN
Status: DISCONTINUED | OUTPATIENT
Start: 2021-01-01 | End: 2021-01-01 | Stop reason: HOSPADM

## 2021-01-01 RX ORDER — TRAMADOL HYDROCHLORIDE 50 MG/1
50 TABLET ORAL EVERY 6 HOURS PRN
Qty: 30 TABLET | Refills: 1 | Status: SHIPPED | OUTPATIENT
Start: 2021-01-01

## 2021-01-01 RX ORDER — LIDOCAINE HYDROCHLORIDE 10 MG/ML
INJECTION, SOLUTION EPIDURAL; INFILTRATION; INTRACAUDAL; PERINEURAL AS NEEDED
Status: DISCONTINUED | OUTPATIENT
Start: 2021-01-01 | End: 2021-01-01 | Stop reason: SURG

## 2021-01-01 RX ORDER — HYDROMORPHONE HYDROCHLORIDE 1 MG/ML
0.6 INJECTION, SOLUTION INTRAMUSCULAR; INTRAVENOUS; SUBCUTANEOUS EVERY 5 MIN PRN
Status: DISCONTINUED | OUTPATIENT
Start: 2021-01-01 | End: 2021-01-01 | Stop reason: HOSPADM

## 2021-01-01 RX ORDER — MORPHINE SULFATE 4 MG/ML
4 INJECTION, SOLUTION INTRAMUSCULAR; INTRAVENOUS ONCE
Status: COMPLETED | OUTPATIENT
Start: 2021-01-01 | End: 2021-01-01

## 2021-01-01 RX ORDER — BUMETANIDE 1 MG/1
1 TABLET ORAL
Qty: 60 TABLET | Refills: 2 | Status: SHIPPED | OUTPATIENT
Start: 2021-01-01

## 2021-01-01 RX ORDER — HYDROCORTISONE 25 MG/G
CREAM TOPICAL 2 TIMES DAILY PRN
Status: DISCONTINUED | OUTPATIENT
Start: 2021-01-01 | End: 2021-01-01

## 2021-01-01 RX ORDER — FINASTERIDE 5 MG/1
TABLET, FILM COATED ORAL
Qty: 90 TABLET | Refills: 3 | Status: SHIPPED | OUTPATIENT
Start: 2021-01-01

## 2021-01-01 RX ORDER — MIDODRINE HYDROCHLORIDE 2.5 MG/1
2.5 TABLET ORAL 3 TIMES DAILY
Qty: 90 TABLET | Refills: 2 | Status: ON HOLD | OUTPATIENT
Start: 2021-01-01 | End: 2021-01-01

## 2021-01-01 RX ORDER — FUROSEMIDE 20 MG/1
30 TABLET ORAL
Status: DISCONTINUED | OUTPATIENT
Start: 2021-01-01 | End: 2021-01-01

## 2021-01-01 RX ORDER — METHYLPREDNISOLONE SODIUM SUCCINATE 40 MG/ML
20 INJECTION, POWDER, LYOPHILIZED, FOR SOLUTION INTRAMUSCULAR; INTRAVENOUS DAILY
Status: COMPLETED | OUTPATIENT
Start: 2021-01-01 | End: 2021-01-01

## 2021-01-01 RX ORDER — POTASSIUM CHLORIDE 20 MEQ/1
40 TABLET, EXTENDED RELEASE ORAL EVERY 4 HOURS
Status: COMPLETED | OUTPATIENT
Start: 2021-01-01 | End: 2021-01-01

## 2021-01-01 RX ORDER — HYDRALAZINE HYDROCHLORIDE 10 MG/1
5 TABLET, FILM COATED ORAL EVERY 8 HOURS SCHEDULED
Status: DISCONTINUED | OUTPATIENT
Start: 2021-01-01 | End: 2021-01-01

## 2021-01-01 RX ORDER — BUMETANIDE 1 MG/1
1 TABLET ORAL
Status: DISCONTINUED | OUTPATIENT
Start: 2021-01-01 | End: 2021-01-01

## 2021-01-01 RX ORDER — FEBUXOSTAT 40 MG/1
20 TABLET, FILM COATED ORAL DAILY
Qty: 15 TABLET | Refills: 0 | Status: ON HOLD | OUTPATIENT
Start: 2021-01-01 | End: 2021-01-01

## 2021-01-01 RX ORDER — FUROSEMIDE 10 MG/ML
40 INJECTION INTRAMUSCULAR; INTRAVENOUS EVERY 8 HOURS
Status: DISCONTINUED | OUTPATIENT
Start: 2021-01-01 | End: 2021-01-01

## 2021-01-01 RX ORDER — ASPIRIN 81 MG/1
81 TABLET ORAL NIGHTLY
Status: DISCONTINUED | OUTPATIENT
Start: 2021-01-01 | End: 2021-01-01

## 2021-01-01 RX ORDER — HYDROCODONE BITARTRATE AND ACETAMINOPHEN 5; 325 MG/1; MG/1
1 TABLET ORAL AS NEEDED
Status: DISCONTINUED | OUTPATIENT
Start: 2021-01-01 | End: 2021-01-01 | Stop reason: HOSPADM

## 2021-01-01 RX ORDER — AMLODIPINE BESYLATE 5 MG/1
5 TABLET ORAL DAILY
Status: DISCONTINUED | OUTPATIENT
Start: 2021-01-01 | End: 2021-01-01

## 2021-01-01 RX ORDER — TAMSULOSIN HYDROCHLORIDE 0.4 MG/1
0.4 CAPSULE ORAL
Status: DISCONTINUED | OUTPATIENT
Start: 2021-01-01 | End: 2021-01-01

## 2021-01-01 RX ORDER — PREDNISONE 10 MG/1
10 TABLET ORAL DAILY
Qty: 100 TABLET | Refills: 2 | Status: ON HOLD | OUTPATIENT
Start: 2021-01-01 | End: 2021-01-01

## 2021-01-01 RX ORDER — METHYLPREDNISOLONE SODIUM SUCCINATE 40 MG/ML
20 INJECTION, POWDER, LYOPHILIZED, FOR SOLUTION INTRAMUSCULAR; INTRAVENOUS ONCE
Status: COMPLETED | OUTPATIENT
Start: 2021-01-01 | End: 2021-01-01

## 2021-01-01 RX ORDER — ISOSORBIDE MONONITRATE 30 MG/1
TABLET, EXTENDED RELEASE ORAL
Qty: 90 TABLET | Refills: 3 | Status: ON HOLD | OUTPATIENT
Start: 2021-01-01 | End: 2021-01-01

## 2021-01-01 RX ORDER — SODIUM CHLORIDE, SODIUM LACTATE, POTASSIUM CHLORIDE, CALCIUM CHLORIDE 600; 310; 30; 20 MG/100ML; MG/100ML; MG/100ML; MG/100ML
INJECTION, SOLUTION INTRAVENOUS CONTINUOUS PRN
Status: DISCONTINUED | OUTPATIENT
Start: 2021-01-01 | End: 2021-01-01 | Stop reason: SURG

## 2021-01-01 RX ORDER — HYDRALAZINE HYDROCHLORIDE 10 MG/1
20 TABLET, FILM COATED ORAL EVERY 8 HOURS SCHEDULED
Status: DISCONTINUED | OUTPATIENT
Start: 2021-01-01 | End: 2021-01-01

## 2021-01-01 RX ORDER — HYDRALAZINE HYDROCHLORIDE 10 MG/1
20 TABLET, FILM COATED ORAL EVERY 8 HOURS SCHEDULED
Qty: 90 TABLET | Refills: 2 | Status: SHIPPED | OUTPATIENT
Start: 2021-01-01

## 2021-01-01 RX ORDER — HALOPERIDOL 5 MG/ML
0.25 INJECTION INTRAMUSCULAR ONCE AS NEEDED
Status: DISCONTINUED | OUTPATIENT
Start: 2021-01-01 | End: 2021-01-01 | Stop reason: HOSPADM

## 2021-01-01 RX ORDER — VANCOMYCIN HYDROCHLORIDE
15 EVERY 24 HOURS
Status: DISCONTINUED | OUTPATIENT
Start: 2021-01-01 | End: 2021-01-01

## 2021-01-01 RX ORDER — COLCHICINE 0.6 MG/1
0.6 TABLET ORAL ONCE
Status: COMPLETED | OUTPATIENT
Start: 2021-01-01 | End: 2021-01-01

## 2021-01-01 RX ORDER — ATORVASTATIN CALCIUM 20 MG/1
20 TABLET, FILM COATED ORAL DAILY
Status: DISCONTINUED | OUTPATIENT
Start: 2021-01-01 | End: 2021-01-01

## 2021-01-01 RX ORDER — SODIUM CHLORIDE 9 MG/ML
INJECTION, SOLUTION INTRAVENOUS ONCE
Status: COMPLETED | OUTPATIENT
Start: 2021-01-01 | End: 2021-01-01

## 2021-01-01 RX ORDER — ACETAMINOPHEN 325 MG/1
650 TABLET ORAL EVERY 6 HOURS PRN
Status: DISCONTINUED | OUTPATIENT
Start: 2021-01-01 | End: 2021-01-01

## 2021-01-01 RX ORDER — HYDROCODONE BITARTRATE AND ACETAMINOPHEN 5; 325 MG/1; MG/1
2 TABLET ORAL EVERY 4 HOURS PRN
Status: DISCONTINUED | OUTPATIENT
Start: 2021-01-01 | End: 2021-01-01

## 2021-01-01 RX ORDER — BUMETANIDE 0.25 MG/ML
2 INJECTION, SOLUTION INTRAMUSCULAR; INTRAVENOUS
Status: DISCONTINUED | OUTPATIENT
Start: 2021-01-01 | End: 2021-01-01

## 2021-01-01 RX ORDER — FINASTERIDE 5 MG/1
5 TABLET, FILM COATED ORAL DAILY
Status: DISCONTINUED | OUTPATIENT
Start: 2021-01-01 | End: 2021-01-01

## 2021-01-01 RX ORDER — CASTOR OIL AND BALSAM, PERU 788; 87 MG/G; MG/G
OINTMENT TOPICAL 2 TIMES DAILY
Status: DISCONTINUED | OUTPATIENT
Start: 2021-01-01 | End: 2021-01-01

## 2021-01-01 RX ORDER — MORPHINE SULFATE 2 MG/ML
2 INJECTION, SOLUTION INTRAMUSCULAR; INTRAVENOUS ONCE
Status: COMPLETED | OUTPATIENT
Start: 2021-01-01 | End: 2021-01-01

## 2021-01-01 RX ORDER — PANTOPRAZOLE SODIUM 40 MG/1
40 TABLET, DELAYED RELEASE ORAL
Status: DISCONTINUED | OUTPATIENT
Start: 2021-01-01 | End: 2021-01-01

## 2021-01-01 RX ORDER — ACETAMINOPHEN 325 MG/1
650 TABLET ORAL EVERY 4 HOURS PRN
Status: DISCONTINUED | OUTPATIENT
Start: 2021-01-01 | End: 2021-01-01

## 2021-01-01 RX ORDER — HYDROCODONE BITARTRATE AND ACETAMINOPHEN 5; 325 MG/1; MG/1
2 TABLET ORAL AS NEEDED
Status: DISCONTINUED | OUTPATIENT
Start: 2021-01-01 | End: 2021-01-01 | Stop reason: HOSPADM

## 2021-01-01 RX ORDER — DOCUSATE SODIUM 100 MG/1
100 CAPSULE, LIQUID FILLED ORAL 2 TIMES DAILY
Status: DISCONTINUED | OUTPATIENT
Start: 2021-01-01 | End: 2021-01-01

## 2021-01-01 RX ORDER — ONDANSETRON 2 MG/ML
4 INJECTION INTRAMUSCULAR; INTRAVENOUS ONCE AS NEEDED
Status: DISCONTINUED | OUTPATIENT
Start: 2021-01-01 | End: 2021-01-01 | Stop reason: HOSPADM

## 2021-01-01 RX ORDER — TRAMADOL HYDROCHLORIDE 50 MG/1
50 TABLET ORAL EVERY 6 HOURS PRN
Qty: 30 TABLET | Refills: 1 | Status: ON HOLD | OUTPATIENT
Start: 2021-01-01 | End: 2021-01-01

## 2021-01-01 RX ORDER — MIDODRINE HYDROCHLORIDE 5 MG/1
2.5 TABLET ORAL 3 TIMES DAILY
Status: DISCONTINUED | OUTPATIENT
Start: 2021-01-01 | End: 2021-01-01

## 2021-01-01 RX ORDER — HYDROCODONE BITARTRATE AND ACETAMINOPHEN 5; 325 MG/1; MG/1
1 TABLET ORAL EVERY 4 HOURS PRN
Status: DISCONTINUED | OUTPATIENT
Start: 2021-01-01 | End: 2021-01-01

## 2021-01-01 RX ORDER — FUROSEMIDE 40 MG/1
40 TABLET ORAL
Status: DISCONTINUED | OUTPATIENT
Start: 2021-01-01 | End: 2021-01-01

## 2021-01-01 RX ORDER — ATORVASTATIN CALCIUM 20 MG/1
20 TABLET, FILM COATED ORAL NIGHTLY
Refills: 3 | Status: DISCONTINUED | OUTPATIENT
Start: 2021-01-01 | End: 2021-01-01

## 2021-01-01 RX ORDER — ISOSORBIDE DINITRATE 20 MG/1
20 TABLET ORAL
Status: DISCONTINUED | OUTPATIENT
Start: 2021-01-01 | End: 2021-01-01

## 2021-01-01 RX ORDER — SODIUM CHLORIDE 0.9 % (FLUSH) 0.9 %
10 SYRINGE (ML) INJECTION AS NEEDED
Status: DISCONTINUED | OUTPATIENT
Start: 2021-01-01 | End: 2021-01-01

## 2021-01-01 RX ORDER — COLCHICINE 0.6 MG/1
0.6 TABLET ORAL EVERY 12 HOURS PRN
Status: DISCONTINUED | OUTPATIENT
Start: 2021-01-01 | End: 2021-01-01

## 2021-01-01 RX ORDER — HYDRALAZINE HYDROCHLORIDE 10 MG/1
10 TABLET, FILM COATED ORAL EVERY 8 HOURS SCHEDULED
Qty: 90 TABLET | Refills: 2 | Status: ON HOLD | OUTPATIENT
Start: 2021-01-01 | End: 2021-01-01

## 2021-01-01 RX ORDER — PROCHLORPERAZINE EDISYLATE 5 MG/ML
5 INJECTION INTRAMUSCULAR; INTRAVENOUS ONCE AS NEEDED
Status: DISCONTINUED | OUTPATIENT
Start: 2021-01-01 | End: 2021-01-01 | Stop reason: HOSPADM

## 2021-01-01 RX ORDER — CHOLECALCIFEROL (VITAMIN D3) 125 MCG
1000 CAPSULE ORAL DAILY
Status: DISCONTINUED | OUTPATIENT
Start: 2021-01-01 | End: 2021-01-01

## 2021-01-01 RX ORDER — FUROSEMIDE 20 MG/1
20 TABLET ORAL
Status: DISCONTINUED | OUTPATIENT
Start: 2021-01-01 | End: 2021-01-01

## 2021-01-01 RX ORDER — HYDROMORPHONE HYDROCHLORIDE 1 MG/ML
0.2 INJECTION, SOLUTION INTRAMUSCULAR; INTRAVENOUS; SUBCUTANEOUS EVERY 5 MIN PRN
Status: DISCONTINUED | OUTPATIENT
Start: 2021-01-01 | End: 2021-01-01 | Stop reason: HOSPADM

## 2021-01-01 RX ORDER — PREDNISONE 20 MG/1
TABLET ORAL
Qty: 23 TABLET | Refills: 0 | Status: SHIPPED
Start: 2021-01-01 | End: 2021-05-20

## 2021-01-01 RX ORDER — FUROSEMIDE 10 MG/ML
20 INJECTION INTRAMUSCULAR; INTRAVENOUS ONCE
Status: COMPLETED | OUTPATIENT
Start: 2021-01-01 | End: 2021-01-01

## 2021-01-01 RX ORDER — SODIUM CHLORIDE, SODIUM LACTATE, POTASSIUM CHLORIDE, CALCIUM CHLORIDE 600; 310; 30; 20 MG/100ML; MG/100ML; MG/100ML; MG/100ML
INJECTION, SOLUTION INTRAVENOUS CONTINUOUS
Status: DISCONTINUED | OUTPATIENT
Start: 2021-01-01 | End: 2021-01-01 | Stop reason: HOSPADM

## 2021-01-01 RX ORDER — PSEUDOEPHEDRINE HCL 30 MG
100 TABLET ORAL 2 TIMES DAILY
Refills: 0 | Status: SHIPPED | COMMUNITY
Start: 2021-01-01

## 2021-01-01 RX ORDER — BUMETANIDE 0.25 MG/ML
2 INJECTION, SOLUTION INTRAMUSCULAR; INTRAVENOUS ONCE
Status: COMPLETED | OUTPATIENT
Start: 2021-01-01 | End: 2021-01-01

## 2021-01-01 RX ORDER — AMLODIPINE BESYLATE 5 MG/1
5 TABLET ORAL DAILY
Qty: 30 TABLET | Refills: 0 | Status: ON HOLD | OUTPATIENT
Start: 2021-01-01 | End: 2021-01-01

## 2021-01-01 RX ORDER — CEFOXITIN 1 G/1
INJECTION, POWDER, FOR SOLUTION INTRAVENOUS AS NEEDED
Status: DISCONTINUED | OUTPATIENT
Start: 2021-01-01 | End: 2021-01-01 | Stop reason: SURG

## 2021-01-01 RX ADMIN — LIDOCAINE HYDROCHLORIDE 50 MG: 10 INJECTION, SOLUTION EPIDURAL; INFILTRATION; INTRACAUDAL; PERINEURAL at 11:28:00

## 2021-01-01 RX ADMIN — TRIAMCINOLONE ACETONIDE 40 MG: 40 INJECTION, SUSPENSION INTRA-ARTICULAR; INTRAMUSCULAR at 12:06:00

## 2021-01-01 RX ADMIN — SODIUM CHLORIDE, SODIUM LACTATE, POTASSIUM CHLORIDE, CALCIUM CHLORIDE: 600; 310; 30; 20 INJECTION, SOLUTION INTRAVENOUS at 15:18:00

## 2021-01-01 RX ADMIN — CEFOXITIN 1 G: 1 INJECTION, POWDER, FOR SOLUTION INTRAVENOUS at 11:35:00

## 2021-01-01 RX ADMIN — ONDANSETRON 4 MG: 2 INJECTION INTRAMUSCULAR; INTRAVENOUS at 11:33:00

## 2021-02-25 NOTE — TELEPHONE ENCOUNTER
To Dr. Joon Morton (on call) to please advise if labs OK to wait for Dr. Nakul Kahn to return on Monday? Per protocol creatinine >1.8 and Hgb <11 need MD review. Thanks!

## 2021-02-26 NOTE — TELEPHONE ENCOUNTER
Noted. Patient's labs are similar to patient's previous lab results. Okay to wait for Dr. Анна Cruz. I will forward this to Dr. Анна Cruz as an Douglass Gowers.

## 2021-03-01 NOTE — PROGRESS NOTES
Nohemy Oviedo is a 66year old male. HPI:   He is here for annual [facility] wellness exam.     He generally feels well but did have some low BP readings and he adjusted labetalol accordingly. He feels chronic edema is about the same.      Recurrent attacks o Oral Tab Take 40 mg by mouth as needed. • PEG 3350-KCl-NaBcb-NaCl-NaSulf (COLYTE WITH FLAVOR PACKS) 240 g Oral Recon Soln Take prep as directed by gastro office.  May substitute with Trilyte/generic equivalent if needed 1 Bottle 0   • ASPIRIN 81 MG Oral • Stroke [facility]) 1993   • UTI (urinary tract infection) 08/2017      Social History:  Social History    Tobacco Use      Smoking status: Former Smoker        Packs/day: 1.00        Years: 45.00        Pack years: 45        Types: Cigarettes        Start da Renal insufficiency  Stable renal insufficiency with creatinine in the range of 2.1    5. Chronic ischemic heart disease  Clinically stable and no chest pain or unusual shortness of breath    6.  Hyperlipidemia, unspecified hyperlipidemia type  Labs pending

## 2021-03-01 NOTE — TELEPHONE ENCOUNTER
Patient called and left detailed message relaying Dr Marco Antonio Ghosh phones number to schedule appt as well.

## 2021-03-01 NOTE — TELEPHONE ENCOUNTER
Refer him to Dr. Frannie Sales, podiatrist at the [facility].   I forgot to give him his name before he left today

## 2021-03-02 NOTE — TELEPHONE ENCOUNTER
Please call him. I forgot to ask him when he was in yesterday does he want me to give him something for his jumping legs at night when he is trying to sleep?     If so, send in ropinirole 0.25 mg daily #30, one at bedtime for restless legs, refill as neede

## 2021-03-13 PROBLEM — I50.9 ACUTE ON CHRONIC CONGESTIVE HEART FAILURE, UNSPECIFIED HEART FAILURE TYPE (HCC): Status: ACTIVE | Noted: 2021-01-01

## 2021-03-13 PROBLEM — K92.2 GASTROINTESTINAL HEMORRHAGE, UNSPECIFIED GASTROINTESTINAL HEMORRHAGE TYPE: Status: ACTIVE | Noted: 2021-01-01

## 2021-03-13 PROBLEM — D64.9 ANEMIA, UNSPECIFIED TYPE: Status: ACTIVE | Noted: 2021-01-01

## 2021-03-13 PROBLEM — I50.9 ACUTE ON CHRONIC CONGESTIVE HEART FAILURE (HCC): Status: ACTIVE | Noted: 2021-01-01

## 2021-03-13 NOTE — ED PROVIDER NOTES
Patient Seen in: [facility] AND [facility] Emergency Department      History   Patient presents with:  Swelling Edema    Stated Complaint: sob, LE swelling    HPI/Subjective:   HPI    67-year-old male with history of hypertension, hypercholesterolemia, coronary • Occlusion and stenosis of carotid artery 12/2/2013   • Recent change in frequency of bowel movements    • Stroke [facility]) 1993   • UTI (urinary tract infection) 08/2017              Past Surgical History:   Procedure Laterality Date   • CATH PERCUTANEOUS Exam      General Appearance: alert, no distress  Eyes: pupils equal and round no pallor or injection  ENT, Mouth: mucous membranes moist  Respiratory: there are no retractions, crackles to the left lung base  Cardiovascular: regular rate and rhythm  Arsalan Books The following orders were created for panel order CBC WITH DIFFERENTIAL WITH PLATELET.   Procedure                               Abnormality         Status                     ---------                               -----------         ------ hemorrhage type  Anemia, unspecified type    Disposition:  Admit  3/13/2021 10:11 am    Follow-up:  No follow-up provider specified.   We recommend that you schedule follow up care with a primary care provider within the next three months to obtain basic he

## 2021-03-13 NOTE — ED INITIAL ASSESSMENT (HPI)
Patient presents to ER via EMS with c/o worsening bilateral LE swelling and some shortness of breath. Patient currently on Lasix.

## 2021-03-13 NOTE — CONSULTS
[facility] Cardiology Consult    Reason for Consultation:  Heart failure and anemia    History of Present Illness:  Isidro Speaker is a 66year old male who presents to the ED for evaluation of worsening lower extremity edema and exertional dys \"Management:  PTCA (1994)\"   • Kidney disease 07/05/2013   • Kidney disorder    • Lipid screening 05-    per NextGen Order Module   • Neutropenia [facility])    • Occlusion and stenosis of carotid artery 12/2/2013   • Recent change in frequency of bowel tab 3x daily   • folic acid (FOLATE) 241 MCG tablet 1 daily   • hydrALAZINE (APRESOLINE) 25 MG tablet two tablets three times daily   • isosorbide mononitrate (IMDUR) 30 MG 24 hr tablet 1 daily   • predniSONE (DELTASONE) 2.5 MG tablet every other day.  1 ta anemia  · Echo/doppler  · Serial troponin, timing of stress test NADEEM Lancaster NP      Cardiology addendum:  Pt examined and assesses. Agree with above.   Patient presented primarily because of worsening leg swelling noted for the last 4 to 6 weeks b

## 2021-03-13 NOTE — CONSULTS
[facility]    Report of Consultation    Melida Atkinson Patient Status:  Inpatient    1945 MRN [id number]   Location [facility] 3W/SW Attending Rohith Cuadra, [address] [facility] Day # 0 PCP Eboni Rios MD     Date of Admission heart disease     per NextGen:  \"Ischemic heart disease\";  \"Management:  PTCA (1994)\"   • Kidney disease 07/05/2013   • Kidney disorder    • Lipid screening 05-    per NextGen Order Module   • Neutropenia [facility])    • Occlusion and stenosis of alston No      Current Medications:  No current facility-administered medications for this encounter.     ISOSORBIDE MONONITRATE ER 30 MG Oral Tablet 24 Hr, TAKE ONE TABLET BY MOUTH DAILY  Pantoprazole Sodium 40 MG Oral Tab EC, Take 1 tablet (40 mg total) by mouth ml   Net -350 ml     Wt Readings from Last 1 Encounters:  03/13/21 : 209 lb (94.8 kg)      Exam  Gen: No acute distress  Heent: NC AT, mucous memb clear, neck supple  Pulm: Lungs clear, normal respiratory effort  CV: Heart with regular rate and rhythm, felicia

## 2021-03-13 NOTE — H&P (VIEW-ONLY)
Gastroenterology consultation note    Reason for consultation:  Anemia, possible gastrointestinal bleeding      History of present illness: The patient is a 66year old male who has a history of nondysplastic long segment Quevedo's esophagus.   His last raza • Atherosclerosis of coronary artery    • Quevedo's esophagus    • BPH (benign prostatic hyperplasia)    • Carotid artery obstruction     per NextGen:  \"100% obstruction RT ICA; 50-60% stenosis L\"   • Chronic ischemic heart disease 9/26/2012   • Colon Oral Tablet 24 Hr, TAKE ONE TABLET BY MOUTH DAILY, Disp: 90 tablet, Rfl: 3  Pantoprazole Sodium 40 MG Oral Tab EC, Take 1 tablet (40 mg total) by mouth every morning before breakfast., Disp: 90 tablet, Rfl: 3  tamsulosin (FLOMAX) cap, Take 1 capsule (0.4 m Substance and Sexual Activity      Alcohol use:  Yes        Alcohol/week: 6.0 standard drinks        Types: 6 Standard drinks or equivalent per week        Comment: drinks occasionally      Drug use: No      Sexual activity: Not on file    Other Topics cancer   • Hypertension Sister        A comprehensive 10 point review of systems was completed. Pertinent positives and negatives noted in the the HPI.         Physical examination:  GEN:  Pleasant well-developed well-nourished male who is in no acute dist to exclude superimposed left basilar pneumonia. Dictated by (CST): Genny Hodges MD on 3/13/2021 at 9:29 AM     Finalized by (CST): Genny Hodges MD on 3/13/2021 at 9:32 AM            Assessment and Plan:    Impression:  1.   Anemia:  The patient has chr

## 2021-03-13 NOTE — H&P
[facility] Patient Status:  Inpatient    1945 MRN [id number]   Location [facility] 3W/SW Attending Cary Mejia, [address] [facility] Day # 0 PCP Teja Winter.  Yamil Mason MD     Date:  3/13/2021  Juan History of thoracentesis 08/07/2017       • Hyposmolality and/or hyponatremia 8/14/2012   • Iron deficiency anemia 7/16/2013   • Ischemic heart disease     per NextGen:  \"Ischemic heart disease\";  \"Management:  PTCA (1994)\"   • Kidney disease 07/05/20 Types: 6 Standard drinks or equivalent per week      Comment: drinks occasionally    Drug use: No    Allergies/Medications:    Allergies: No Known Allergies  ISOSORBIDE MONONITRATE ER 30 MG Oral Tablet 24 Hr, TAKE ONE TABLET BY MOUTH DAILY  Pantoprazole Sod negative  Musculoskeletal:negative for headache  Neurological: negative for dizziness and headaches  Behavioral/Psych: negative for depression  Allergic/Immunologic: negative    Physical Exam:   Vital Signs:  Blood pressure 142/83, pulse 61, temperature 98 AST 16 11/09/2020    ALT 21 11/09/2020    PTT 30.8 02/01/2018    INR 1.1 02/01/2018    PT 14.2 07/23/2012    TSH 2.020 11/09/2020    PSA 1.0 01/17/2017    DDIMER >4.00 (H) 03/02/2018    ESRML 60 (H) 05/27/2019    CRP 5.64 (H) 05/27/2019    MG 2.1 02/25/ Ruth Ann Ladd, DO  2/88/5089  **Certification      PHYSICIAN Certification of Need for Inpatient Hospitalization - Initial Certification    Patient will require inpatient services that will reasonably be expected to span two midnight's based on the clinical docum

## 2021-03-13 NOTE — CONSULTS
Gastroenterology consultation note    Reason for consultation:  Anemia, possible gastrointestinal bleeding      History of present illness: The patient is a 66year old male who has a history of nondysplastic long segment Quevedo's esophagus.   His last raza • Atherosclerosis of coronary artery    • Quevedo's esophagus    • BPH (benign prostatic hyperplasia)    • Carotid artery obstruction     per NextGen:  \"100% obstruction RT ICA; 50-60% stenosis L\"   • Chronic ischemic heart disease 9/26/2012   • Colon Oral Tablet 24 Hr, TAKE ONE TABLET BY MOUTH DAILY, Disp: 90 tablet, Rfl: 3  Pantoprazole Sodium 40 MG Oral Tab EC, Take 1 tablet (40 mg total) by mouth every morning before breakfast., Disp: 90 tablet, Rfl: 3  tamsulosin (FLOMAX) cap, Take 1 capsule (0.4 m Substance and Sexual Activity      Alcohol use:  Yes        Alcohol/week: 6.0 standard drinks        Types: 6 Standard drinks or equivalent per week        Comment: drinks occasionally      Drug use: No      Sexual activity: Not on file    Other Topics cancer   • Hypertension Sister        A comprehensive 10 point review of systems was completed. Pertinent positives and negatives noted in the the HPI.         Physical examination:  GEN:  Pleasant well-developed well-nourished male who is in no acute dist to exclude superimposed left basilar pneumonia. Dictated by (CST): Blane Peace MD on 3/13/2021 at 9:29 AM     Finalized by (CST): Blane Peace MD on 3/13/2021 at 9:32 AM            Assessment and Plan:    Impression:  1.   Anemia:  The patient has chr

## 2021-03-13 NOTE — ED NOTES
Orders for admission, patient is aware of plan and ready to go upstairs. Any questions, please call ED RN Aline Swanson at extension [phone].   Type of COVID test sent: Yes  COVID Suspicion level: Low    Titratable drug(s) infusing: None  Rate:     LOC at time of

## 2021-03-14 NOTE — PROGRESS NOTES
· [facility] Card[facility]      Subjective:  Dyspnea and edema improving    Objective:  /66 (BP Location: Right arm)   Pulse 54   Temp 97.8 °F (36.6 °C) (Oral)   Resp 16   Ht 6' 1\" (1.854 m)   Wt 202 lb 12.8 oz (92 kg)   SpO2 96%   BMI 26.76 kg/m² AFib  · Xarelto held with anemia will need EGD/colon  · Echo/doppler  · Eventual stress test likely can defer until outpatient    Kevin Chao NP

## 2021-03-14 NOTE — PROGRESS NOTES
[facility]    Progress Note    Melida Atkinson Patient Status:  Inpatient    1945 MRN [id number]   Location [facility] 3W/SW Attending Rohith Cuadra, [address] [facility] Day # 1 PCP Eboni Fournier.  MD Blanca       Subjective:   Lisa Quintana Moderate cardiomegaly with central pulmonary vascular congestion. The costophrenic angles are blunted, left greater than right, suggesting a trace right and small left pleural effusion. A left basilar opacity is probably associated atelectasis.   Difficu

## 2021-03-14 NOTE — PROGRESS NOTES
[facility]    Progress Note    Erika Walters Patient Status:  Inpatient    1945 MRN [id number]   Location [facility] 3W/SW Attending Carlito Dang, [address] [facility] Day # 1 PCP Stewart Rios MD       Subjective:   Oral Pascual 93 03/14/2021    CA 8.5 03/14/2021    ALB 2.8 (L) 03/14/2021    ALKPHO 64 03/13/2021    BILT 0.7 03/13/2021    TP 5.2 (L) 03/13/2021    AST 9 (L) 03/13/2021    ALT 17 03/13/2021    PTT 30.8 02/01/2018    INR 1.1 02/01/2018    PT 14.2 07/23/2012    TSH 2.02 significant changes have occurred Electronically signed on 03/13/2021 at 17:08 by Gelacio Brown MD      Results:     CBC:    Lab Results   Component Value Date    WBC 5.2 03/14/2021    WBC 5.5 03/13/2021    WBC 6.9 03/13/2021     Lab Results   Component V

## 2021-03-14 NOTE — PLAN OF CARE
Problem: Patient Centered Care  Goal: Patient preferences are identified and integrated in the patient's plan of care  Description: Interventions:  - What would you like us to know as we care for you? The slightest bump or scratch causes me to bruise. lead EKG if indicated  - Evaluate effectiveness of antiarrhythmic and heart rate control medications as ordered  - Initiate emergency measures for life threatening arrhythmias  - Monitor electrolytes and administer replacement therapy as ordered  Outcome:

## 2021-03-14 NOTE — PLAN OF CARE
Patient is on Lasix BID. Pts Xarelto is held due to bleeding. Patient had elevated Troponin 0.115 Dr. Hubert Salinas was notified. Patient has PT consult in. Patient had an Echo done today EF 44%. Per Dr. Hubert Salinas EGD tomorrow if lungs improve.  Patient is on fluid r output  - Evaluate effectiveness of vasoactive medications to optimize hemodynamic stability  - Monitor arterial and/or venous puncture sites for bleeding and/or hematoma  - Assess quality of pulses, skin color and temperature  - Assess for signs of decrea

## 2021-03-15 NOTE — TELEPHONE ENCOUNTER
S/w patient and Relayed MD's message to patient---verbalized understanding  Patient states he discussed this with MD and is not interested in taking any medication for restless legs.

## 2021-03-15 NOTE — PHYSICAL THERAPY NOTE
PHYSICAL THERAPY EVALUATION - INPATIENT     Room Number: 337/337-A  Evaluation Date: 3/15/2021  Type of Evaluation: Initial   Physician Order: PT Eval and Treat    Presenting Problem: acute on chronic CHF  Reason for Therapy: Mobility Dysfunction and Disc training;Transfer training  Rehab Potential : Fair  Frequency (Obs): 3-5x/week       PHYSICAL THERAPY MEDICAL/SOCIAL HISTORY     History related to current admission:     Problem List  Principal Problem:    Acute on chronic congestive heart failure, unspec 09-,01/2015   • ELECTROCARDIOGRAM, COMPLETE  09-    Scanned to media tab - DOS 09-   • ESOPHAGOGASTRODUODENOSCOPY (EGD) N/A 1/13/2017    Performed by Yee Wilkinson MD at [address] [place] [address] ENDOSCOPY   • OTHER SURGICAL HISTORY  3/17/2016    PP O2 WALK                  AM-PAC '6-Clicks' INPATIENT SHORT FORM - BASIC MOBILITY  How much difficulty does the patient currently have. ..  -   Turning over in bed (including adjusting bedclothes, sheets and blankets)?: A Lot   -   Sitting down on and supervision with walker - rolling     Goal #2  Current Status    Goal #3 Patient is able to ambulate 100 feet with assist device: walker - rolling at assistance level: supervision   Goal #3   Current Status    Goal #4 Patient to demonstrate independence wi

## 2021-03-15 NOTE — PROGRESS NOTES
[facility]    Progress Note    Bridgett Mueller Patient Status:  Inpatient    1945 MRN [id number]   Location [facility] 3W/SW Attending Blayne Quick, [address] [facility] Day # 2 PCP Seymour Cooley.  MD Blanca       Subjective:   Aaron Lopez CO2 29.0 03/15/2021     (H) 03/15/2021    CA 8.8 03/15/2021    ALB 2.8 (L) 03/15/2021    ALKPHO 64 03/13/2021    BILT 0.7 03/13/2021    TP 5.2 (L) 03/13/2021    AST 9 (L) 03/13/2021    ALT 17 03/13/2021    PTT 30.8 02/01/2018    INR 1.1 02/01/2018       dvt ppx scd/teds            Jayde Mccormick, DO  >35 min spent with patient. > 50% time was spent counseling patient, discussing plan of care, discussing labs and imaging findings. Spoke with consultant.  Al

## 2021-03-15 NOTE — PROGRESS NOTES
· [facility]      Subjective:  Up in chair, dyspnea resolved no chest pain, still with mild LE edema    Objective:  /86 (BP Location: Right arm)   Pulse 68   Temp 98.3 °F (36.8 °C) (Oral)   Resp 15   Ht 6' 1\" (1.854 m)   Wt 198 lb 3.2 o with PT  · Anemia plans for EGD, then colon if he agrees  · Thrombocytopenia plt down to 112 today will follow    Plan:   · Diuretics per Nephrology - maybe just once daily dosing for now  · Holding Xarelto ideally would be on ASA but with Hgb 7.5 will hol

## 2021-03-15 NOTE — CM/SW NOTE
BPCI Bundled Advanced Medicare Program    Plan of care reviewed for care coordination and discharge planning. Noted pt falls under  BPCI/Medicare program, with DRG Congestive Heart Failure (291, W)    [facility]  [facility]   Pt from home

## 2021-03-15 NOTE — PROGRESS NOTES
[place] [facility] - [facility]    Progress Note    Juan Antonio Kirkland Patient Status:  Inpatient    1945 MRN [id number]   Location [facility] 3W/SW Attending Benton Martinez, [address] [facility] Day # 2 PCP Radha Eric.  MD Blanca       Subjective:   Mary Beth Middleton 110 108   CO2 25.0 29.0 29.0          No results found.         Norma Quintero MD  3/15/2021

## 2021-03-15 NOTE — PLAN OF CARE
Denies chest pain and shortness of breath. Room air, saturating 96-98%. IV lasix as ordered given. Continue daily weights, fluid intake monitoring, low salt diet.    Problem: CARDIOVASCULAR - ADULT  Goal: Maintains optimal cardiac output and hemodynamic st

## 2021-03-16 NOTE — PROGRESS NOTES
[facility]    Progress Note    Jaskaran Cabrera Patient Status:  Inpatient    1945 MRN [id number]   Location [facility] 3W/SW Attending James Hicks, [address] [facility] Day # 3 PCP Gloria Rios MD     HPI/Subjective:     Resp shadiai with cr    CAD   -PCI LAD [facility] 2015 moderate non obstructive CAD  - Mild troponin flat trend no chest pain  - per md Ischemic work-up in the near future but not necessary immediately.   Dye load would be extremely unfavorable for his kidneys at [facility]

## 2021-03-16 NOTE — PROGRESS NOTES
[place] [facility] - [facility]    Progress Note    Breonna Villatoro Patient Status:  Inpatient    1945 MRN [id number]   Location [facility] 3W/SW Attending Sybil John, [address] [facility] Day # 3 PCP Sarita Pak.  MD Blanca       Subjective:   Jacque Lala (L) 03/16/2021    CREATSERUM 2.08 (H) 03/16/2021    BUN 73 (H) 03/16/2021     03/16/2021    K 3.6 03/16/2021     03/16/2021    CO2 28.0 03/16/2021    GLU 95 03/16/2021    CA 8.6 03/16/2021    ALB 2.6 (L) 03/16/2021    ALKPHO 64 03/13/2021    BI xarelto      Positive troponin , ekg with afib controlled rate   - repeat cm's flat   - echo as above ; hypokinesis, st outpt   - tele   - cards         dvt ppx scd/teds            [place] Jam, DO  >35 min spent with patient.  > 50% time was spent couns

## 2021-03-16 NOTE — ANESTHESIA PREPROCEDURE EVALUATION
Anesthesia PreOp Note    HPI:     Robert Bains is a 66year old male who presents for preoperative consultation requested by: Lucien Moore MD    Date of Surgery: 3/13/2021 - 3/16/2021    Procedure(s):  ESOPHAGOGASTRODUODENOSCOPY (EGD)  Indication: anemi 12/02/2013      Anemia in chronic kidney disease         Date Noted: 10/04/2013      Iron deficiency anemia         Date Noted: 07/16/2013      Hypertension, benign         Date Noted: 05/28/2013      Chronic ischemic heart disease         Date Noted: 09/2 MD at [facility] ENDOSCOPY   • OTHER SURGICAL HISTORY  3/17/2016    [address] [facility] hemorrhoidectomy & rectal mucosa proctopexy    • OTHER SURGICAL HISTORY Left 01/31/2018    Thoracoscopy/VATS   • THORACOSCOPY/VATS Left 1/31/2018    Performed by Maurizio Farias MD at [facility] Take  by mouth. take 1 tablet (0.4MG)  by oral route  every day, Disp: , Rfl: , 3/12/2021 at Unknown time  PEG 3350-KCl-NaBcb-NaCl-NaSulf (COLYTE WITH FLAVOR PACKS) 240 g Oral Recon Soln, Take prep as directed by gastro office.  May substitute with Trilyte/ (Other) Father 80        per [employer]   • Hypertension Mother    • Breast Cancer Mother    • Cancer Sister         lung cancer   • Hypertension Sister      Social History    Socioeconomic History      Marital status:       Spouse name: Not on file Feeling of Stress :   Social Connections:       Frequency of Communication with Friends and Family:       Frequency of Social Gatherings with Friends and Family:       Attends Zoroastrianism Services:       Active Member of Clubs or Organizations:       Attends Systolic function was mildly      reduced. The estimated ejection fraction was 44%, by biplane      method of disks. Mild hypokinesis of the lateral myocardium.      Mild hypokinesis of the apicalinferior myocardium. 2. Aortic valve: Mild regurgitation.

## 2021-03-16 NOTE — ANESTHESIA PREPROCEDURE EVALUATION
Anesthesia PreOp Note    HPI:     Isidro Stevens is a 66year old male who presents for preoperative consultation requested by: Clara Pak MD    Date of Surgery: 3/13/2021 - 3/16/2021    Procedure(s):  ESOPHAGOGASTRODUODENOSCOPY (EGD)  Indication: anemi 12/02/2013      Anemia in chronic kidney disease         Date Noted: 10/04/2013      Iron deficiency anemia         Date Noted: 07/16/2013      Hypertension, benign         Date Noted: 05/28/2013      Chronic ischemic heart disease         Date Noted: 09/2 MD at [address] [place] [address] ENDOSCOPY   • OTHER SURGICAL HISTORY  3/17/2016    [address] [facility] hemorrhoidectomy & rectal mucosa proctopexy    • OTHER SURGICAL HISTORY Left 01/31/2018    Thoracoscopy/VATS   • THORACOSCOPY/VATS Left 1/31/2018    Performed by Frandy Corado MD at [address] [place] [address] Take  by mouth. take 1 tablet (0.4MG)  by oral route  every day, Disp: , Rfl: , 3/12/2021 at Unknown time  PEG 3350-KCl-NaBcb-NaCl-NaSulf (COLYTE WITH FLAVOR PACKS) 240 g Oral Recon Soln, Take prep as directed by gastro office.  May substitute with Trilyte/ (Other) Father 80        per [employer]   • Hypertension Mother    • Breast Cancer Mother    • Cancer Sister         lung cancer   • Hypertension Sister      Social History    Socioeconomic History      Marital status:       Spouse name: Not on file Feeling of Stress :   Social Connections:       Frequency of Communication with Friends and Family:       Frequency of Social Gatherings with Friends and Family:       Attends Sikh Services:       Active Member of Clubs or Organizations:       Attends is probably associated atelectasis.     Difficult to exclude superimposed left basilar pneumonia.    Cardiovascular   (+) hypertension, valvular problems/murmurs MR, CAD, dysrhythmias, CHF, FREEMAN,     ECG reviewed  ROS comment: Afib, ischemia heart disease, a

## 2021-03-16 NOTE — OPERATIVE REPORT
ESOPHAGOGASTRODUODENOSCOPY (EGD) REPORT    Iliana Lopes     1945 Age 66year old   PCP Bessy Callahan.  Delia Padilla MD Endoscopist Sravan Montes MD       Date of procedure: 21    Procedure: EGD w/ biopsies    Pre-operative diagnosis: KORIN and dark stool fundus and a normal-patulous cardia. 3. Duodenum: The duodenal mucosa appeared normal in the 1st and 2nd portion of the duodenum. Impression:  1. No obvious etiology for anemia  2. Continue PPI and await pathology    Recommend:  1.  Should have a co

## 2021-03-16 NOTE — PLAN OF CARE
Alert and oriented x 4, denies shortness of breath at this time, chronic pain both legs and lower back, declined pain medication at this time, sitting up in chair, hemoglobin 7.3 this morning, passed black stool this morning, EGD completed this afternoon, temperature  - Assess for signs of decreased coronary artery perfusion - ex.  Angina  - Evaluate fluid balance, assess for edema, trend weights  Outcome: Progressing  Goal: Absence of cardiac arrhythmias or at baseline  Description: INTERVENTIONS:  - Contin

## 2021-03-16 NOTE — PROGRESS NOTES
[facility]     Gastroenterology Progress Note    Jorge Burn Patient Status:  Inpatient    1945 MRN [id number]   Location [facility] 3W/SW Attending Valente Santiago, [address] [facility] Day # 3 PCP Natalie Styles.  MD Blanca which will be planned for this afternoon.      #hx non-dysplastic long-segment Quevedo's Esophagus: overdue for surveillance - last surveillance endoscopy January 2017 with 3 year follow up advised    #personal hx of adenomatous colon polyps: surveillance c procedure; additional time in coordination of care and d/w care team.    Ashley Cline [facility] Gastroenterology  3/16/2021  Results:     Lab Results   Component Value Date    WBC 4.5 03/16/2021    HGB 7.3 (L) 03/16/2021    HCT 23.8 (L) 03/16/20

## 2021-03-16 NOTE — INTERVAL H&P NOTE
Pre-op Diagnosis: anemia    The above referenced H&P was reviewed by aCtarina Moon MD on 3/16/2021, the patient was examined and no significant changes have occurred in the patient's condition since the H&P was performed.   I discussed with the patient and/o

## 2021-03-16 NOTE — ANESTHESIA PREPROCEDURE EVALUATION
Anesthesia PreOp Note    HPI:     Melida Atkinson is a 66year old male who presents for preoperative consultation requested by: Virgilio rOtega MD    Date of Surgery: 3/13/2021 - 3/16/2021    Procedure(s):  ESOPHAGOGASTRODUODENOSCOPY (EGD)  Indication: anemi 12/02/2013      Anemia in chronic kidney disease         Date Noted: 10/04/2013      Iron deficiency anemia         Date Noted: 07/16/2013      Hypertension, benign         Date Noted: 05/28/2013      Chronic ischemic heart disease         Date Noted: 09/2 MD at [facility] ENDOSCOPY   • OTHER SURGICAL HISTORY  3/17/2016    [address] [facility] hemorrhoidectomy & rectal mucosa proctopexy    • OTHER SURGICAL HISTORY Left 01/31/2018    Thoracoscopy/VATS   • THORACOSCOPY/VATS Left 1/31/2018    Performed by Catarina Patiño MD at [facility] Take  by mouth. take 1 tablet (0.4MG)  by oral route  every day, Disp: , Rfl: , 3/12/2021 at Unknown time  PEG 3350-KCl-NaBcb-NaCl-NaSulf (COLYTE WITH FLAVOR PACKS) 240 g Oral Recon Soln, Take prep as directed by gastro office.  May substitute with Trilyte/ (Other) Father 80        per [employer]   • Hypertension Mother    • Breast Cancer Mother    • Cancer Sister         lung cancer   • Hypertension Sister      Social History    Socioeconomic History      Marital status:       Spouse name: Not on file Feeling of Stress :   Social Connections:       Frequency of Communication with Friends and Family:       Frequency of Social Gatherings with Friends and Family:       Attends [employer] Services:       Active Member of Clubs or Organizations:       Attends is probably associated atelectasis.     Difficult to exclude superimposed left basilar pneumonia.    Cardiovascular   (+) hypertension, valvular problems/murmurs MR, CAD, dysrhythmias, CHF, FREEMAN,     ECG reviewed  ROS comment: Afib, ischemia heart disease, a

## 2021-03-16 NOTE — PLAN OF CARE
AO3, up with assist, IV lasix, EF of 44%, PT on board and working with patient, recommendation for subacute rehab once medically stable. GI consulted due to low hgb. No signs and symptoms of pain or sob at this time.       Problem: CARDIOVASCULAR - ADULT

## 2021-03-17 NOTE — PLAN OF CARE
AO3, up with assist, EGD done and showed no bleeding but barrits esophagus, IV lasix increased to TID, EF of 44%, PT working with patient, refusing colonoscopy at this time. PRN tramadol given for leg pain with some relief.       Problem: CARDIOVASCULAR -

## 2021-03-17 NOTE — PROGRESS NOTES
[facility]    Progress Note    Santa Green Patient Status:  Inpatient    1945 MRN [id number]   Location [facility] 3W/SW Attending Maureen Cardona [place] Day # 4 PCP Bob Ohara.  MD Blanca     CARDIOLOGY ATTENDIN 03/17/2021    ALKPHO 64 03/13/2021    BILT 0.7 03/13/2021    TP 5.2 (L) 03/13/2021    AST 9 (L) 03/13/2021    ALT 17 03/13/2021    PTT 30.8 02/01/2018    INR 1.1 02/01/2018    PT 14.2 07/23/2012    TSH 2.020 11/09/2020    PSA 1.0 01/17/2017    DDIMER >4.00

## 2021-03-17 NOTE — PLAN OF CARE
Nephrology & GI signed off. No further bloody stools. Pt is refusing CAMRYN. Ambulated with PT in room- poor tolerance. IV lasix changed to PO today. Plan- PT to come work with patient on stairs tomorrow.  Discharge home when medically stable    Problem: Pa perfusion - ex.  Angina  - Evaluate fluid balance, assess for edema, trend weights  Outcome: Progressing  Goal: Absence of cardiac arrhythmias or at baseline  Description: INTERVENTIONS:  - Continuous cardiac monitoring, monitor vital signs, obtain 12 lead

## 2021-03-17 NOTE — PROGRESS NOTES
[facility]     Gastroenterology Progress Note    Ever Dark Patient Status:  Inpatient    1945 MRN [id number]   Location [facility] 3W/SW Attending Aleshia Moreira, [address] [facility] Day # 4 PCP Julian Sánchez.  MD Blanca well. Advised PO iron may worsen constipation, therefore would recommend bowel regimen (MiraLAX + colace) to help with this.    >>3/17: Hgb stable, no reported melanotic or bloody BM. Cr is increased, cardiology/nephrology to evaluate today.      Recommend

## 2021-03-17 NOTE — PROGRESS NOTES
[facility]    Progress Note    Caroline Wall Patient Status:  Inpatient    1945 MRN [id number]   [facility] 3W/SW Attending Maureen Cardona [place] Day # 4 PCP Martell Gonzales.  MD Blanca       Subjective:   Kallie Sanchez

## 2021-03-17 NOTE — CM/SW NOTE
Met with patient at the bedside for discharge Planning. Patient stated he lives alone in an apartment owned by his sister  Gayla Schmid. Patient lives  on the first floor, with 5 stairs to enter.  Patient stated the woman on the lower floor assist with ADL' and

## 2021-03-17 NOTE — PROGRESS NOTES
[place] [facility] - [facility]    Progress Note    Miguel Cottrell Patient Status:  Inpatient    1945 MRN [id number]   Location [facility] 3W/SW Attending Maureen Cardona Day # 4 PCP Cora Rios MD     HPI/Subjective: 01/17/2017    DDIMER >4.00 (H) 03/02/2018    ESRML 60 (H) 05/27/2019    CRP 5.64 (H) 05/27/2019    MG 1.7 03/14/2021    PHOS 3.2 03/17/2021    TROP 0.115 (HH) 03/14/2021    CK 45 09/25/2019    B12 1,058 (H) 03/02/2018       No results found.         Assessm

## 2021-03-17 NOTE — TELEPHONE ENCOUNTER
INPATIENT ORDERS:  - f/u outpatient with Trice BARRERA or Dr. Brewer Led in 2-4 weeks  - EGD during this admission  - will need another discussion re: Maite Sarmiento 63 outpatient

## 2021-03-17 NOTE — PHYSICAL THERAPY NOTE
PHYSICAL THERAPY TREATMENT NOTE - INPATIENT     Room Number: 337/337-A       Presenting Problem: acute on chronic CHF    Problem List  Principal Problem:    Acute on chronic congestive heart failure, unspecified heart failure type [facility])  Active Problems: pain bottom of feet, dry, cracked skin)     Management Techniques:  (spoke with RN to recommend lotion for feet)    BALANCE                                                                                                                     Static Sitting: Goal #3 Patient is able to ambulate 100 feet with assist device: walker - rolling at assistance level: supervision   Goal #3   Current Status Amb 30ft x 2 w/ RW & CGA   Goal #4 Patient to demonstrate independence with home activity/exercise instructions

## 2021-03-17 NOTE — TELEPHONE ENCOUNTER
Patient still admitted. Will flag encounter to continue monitoring to see when patient is discharged to schedule OP appointment.

## 2021-03-17 NOTE — HOME CARE LIAISON
Received referral from Celestine for [facility] services. Spoke with patient who stated that he did not want to make a decision at this time, as he wanted to see Shearon Justo was going on\" prior to making a decision. Notified Celestine & BRANDEN/Kandis.

## 2021-03-18 NOTE — PROGRESS NOTES
[place] [facility] - [facility]    Progress Note    Zhen Matias Patient Status:  Inpatient    1945 MRN [id number]   Location [facility] 3W/SW Attending Maureen Cadronaissa Day # 5 PCP Alli Rios MD       Subjective:   Ceola Paget 07/23/2012    TSH 2.020 11/09/2020    PSA 1.0 01/17/2017    DDIMER >4.00 (H) 03/02/2018    ESRML 60 (H) 05/27/2019    CRP 5.64 (H) 05/27/2019    MG 1.7 03/18/2021    PHOS 3.2 03/18/2021    TROP 0.115 (HH) 03/14/2021    CK 45 09/25/2019    B12 1,058 (H) 03/

## 2021-03-18 NOTE — PROGRESS NOTES
[facility]    Progress Note    Fransisco Finney Patient Status:  Inpatient    1945 MRN [id number]   Location [facility] 3W/SW Attending Maureen Cardona Day # 5 PCP Leo Morris.  MD Blanca     CARDIOLOGY ATTENDIN (L) 03/18/2021    ALKPHO 64 03/13/2021    BILT 0.7 03/13/2021    TP 5.2 (L) 03/13/2021    AST 9 (L) 03/13/2021    ALT 17 03/13/2021    PTT 30.8 02/01/2018    INR 1.1 02/01/2018    PT 14.2 07/23/2012    TSH 2.020 11/09/2020    PSA 1.0 01/17/2017    DDIMER >

## 2021-03-18 NOTE — PHYSICAL THERAPY NOTE
PHYSICAL THERAPY TREATMENT NOTE - INPATIENT     Room Number: 337/337-A       Presenting Problem: acute on chronic CHF    Problem List  Principal Problem:    Acute on chronic congestive heart failure, unspecified heart failure type [facility])  Active Problems: functional mobility. Recommended to RN to use one assist.   The patient's Approx Degree of Impairment: 50.57% has been calculated based on documentation in the [facility] '6 clicks' Inpatient Basic Mobility Short Form.   Research supports that patients with this 42.13   CMS Modifier (G-Code): CK    FUNCTIONAL ABILITY STATUS  Gait Assessment   Gait Assistance: Contact guard assist  Distance (ft): 40  Assistive Device: Rolling walker  Pattern: Shuffle;Comment (crouched gait, cervical flexion)  Stoop/Curb Assistance:

## 2021-03-18 NOTE — PHYSICAL THERAPY NOTE
PHYSICAL THERAPY TREATMENT NOTE - INPATIENT     Room Number: 337/337-A       Presenting Problem: acute on chronic CHF    Problem List  Principal Problem:    Acute on chronic congestive heart failure, unspecified heart failure type [facility])  Active Problems: Sitting: Fair +  Dynamic Sitting: Fair +           Static Standing: Fair -  Dynamic Standing: Fair    ACTIVITY TOLERANCE      BP:  At rest in chair:  114/69  Standin/63 (asymptomatic)  Returned to sitting for a few minutes.   Standing second attempt: Current Status NT today    Goal #2 Patient is able to demonstrate transfers Sit to/from Stand at assistance level: supervision with walker - rolling       Goal #2  Current Status CG    Goal #3 Patient is able to ambulate 100 feet with assist device: walk

## 2021-03-18 NOTE — PROGRESS NOTES
[facility]    Progress Note    Caroline Wall Patient Status:  Inpatient    1945 MRN [id number]   Location [facility] 3W/SW Attending Maureen Cardona Prachi Day # 5 PCP Martell Gonzales.  MD Blanca     HPI/Subjective: 01/17/2017    DDIMER >4.00 (H) 03/02/2018    ESRML 60 (H) 05/27/2019    CRP 5.64 (H) 05/27/2019    MG 1.7 03/18/2021    PHOS 3.2 03/18/2021    TROP 0.115 (HH) 03/14/2021    CK 45 09/25/2019    B12 1,058 (H) 03/02/2018       No results found.         Assessm

## 2021-03-19 NOTE — PROGRESS NOTES
[facility]    [facility] Note    Brant Lemon Patient Status:  Inpatient    1945 MRN [id number]   Location [facility] 3W/SW Attending Marquis Oliva,*   Hosp Day # 6 PCP Víctor Rios MD       Subjective:   Ligia Giraldo TROP 0.115 (HH) 03/14/2021    CK 45 09/25/2019    B12 1,058 (H) 03/02/2018       No results found.           Ambreen Lopez MD  3/19/2021

## 2021-03-19 NOTE — PLAN OF CARE
Orthostatics noted to be (+) during PT/OT. Manual BP checked for orthostatics on pt per order and thigh high VICKEY hose applied. IV site rotated. Orthostatics to be checked every shift and prior to ambulation.  PT/OT ordered to see pt in AM and PM. Stage 2 on of pulses, skin color and temperature  - Assess for signs of decreased coronary artery perfusion - ex.  Angina  - Evaluate fluid balance, assess for edema, trend weights  Outcome: Progressing  Goal: Absence of cardiac arrhythmias or at baseline  Description

## 2021-03-19 NOTE — PHYSICAL THERAPY NOTE
Chart reviewed pt and consulted with RN, Lynell Fabry who gave approval for PT treatment. Pt receiving sitting up in recliner and motivated to work with PT. Pt transferred sit>stand with RW and SBA. Pt reported feeling light headed up on stand, BP taken 91/60.  Pt

## 2021-03-19 NOTE — PROGRESS NOTES
[facility]    Progress Note    Santa Green Patient Status:  Inpatient    1945 MRN [id number]   Location [facility] 3W/SW Attending Jhon Ruiz,*   Hosp Day # 6 PCP Bob Ohara.  MD Blanca     HPI/Subjective: TROP 0.115 (HH) 03/14/2021    CK 45 09/25/2019    B12 1,058 (H) 03/02/2018       No results found. Assessment & Plan:   Acute HFrEF, hypervolemic  -EF 40-45 %  hypokinesis mod MR- elevated CVP   - Net -3.7L w/ UOP >8L/day. Wt down total 9lbs.   C APRN  3/19/2021

## 2021-03-19 NOTE — PROGRESS NOTES
[facility]    Progress Note    Equilla Sor Patient Status:  Inpatient    1945 MRN [id number]   Location [facility] 3W/SW Attending Madison Sheikh,*   Hosp Day # 6 PCP Shelley Rios MD     HPI/Subjective: 1.0 01/17/2017    DDIMER >4.00 (H) 03/02/2018    ESRML 60 (H) 05/27/2019    CRP 5.64 (H) 05/27/2019    MG 1.7 03/18/2021    PHOS 3.4 03/19/2021    TROP 0.115 (HH) 03/14/2021    CK 45 09/25/2019    B12 1,058 (H) 03/02/2018       No results found.         Ass

## 2021-03-20 NOTE — PROGRESS NOTES
[facility]    Progress Note    Chelsie Chou Patient Status:  Inpatient    1945 MRN [id number]   Location [facility] 3W/SW Attending Maureen Cardona Day # 7 PCP Elaine Todd.  MD Blanca       Subjective:   [facility] rates 70-80's  Lungs: diminished. Normal excursions and effort. Abdomen: Soft, non-tender. No mass or rebound, BS-present x 4. Extremities: LE edema trace.   Peripheral pulses are 2+, equal and bilateral.        Assessment and Plan:      Acute HFrEF, hyp 05/27/2019    MG 1.7 03/20/2021    PHOS 3.4 03/19/2021    TROP 0.115 (HH) 03/14/2021    CK 45 09/25/2019    B12 1,058 (H) 03/02/2018       No results found.           Georgeana Hamman, APRN  3/20/2021

## 2021-03-20 NOTE — PHYSICAL THERAPY NOTE
PHYSICAL THERAPY TREATMENT NOTE - INPATIENT     Room Number: 337/337-A       Presenting Problem: acute on chronic CHF    Problem List  Principal Problem:    Acute on chronic congestive heart failure, unspecified heart failure type [facility])  Active Problems: therex to improve perfusion and blood flow. Pt not wearing VICKEY hose as he felt they were hurting him. Discussed possible use of abdominal binder to assist with blood pressure regulation with RN.  Pt in recliner at end of session, agreeable to work on seated back to sitting on the side of the bed?: A Little   How much help from another person does the patient currently need. ..   -   Moving to and from a bed to a chair (including a wheelchair)?: A Little   -   Need to walk in hospital room?: A [address]

## 2021-03-20 NOTE — PLAN OF CARE
L knee dressing changed-- cleansed w/ wound cleanser, xeroform and Mepilex applied. Pt bladder scanned and found to be retaining urine-- MD notified. Oliveira/straight cath strictly refused.  Thigh high VICKEY hose declined after a few hours of wear d/t c/o BLE p effectiveness of vasoactive medications to optimize hemodynamic stability  - Monitor arterial and/or venous puncture sites for bleeding and/or hematoma  - Assess quality of pulses, skin color and temperature  - Assess for signs of decreased coronary artery

## 2021-03-20 NOTE — PROGRESS NOTES
[place] [facility]    Progress Note    Juan Antonio Kirkland Patient Status:  Inpatient    1945 MRN [id number]   Location [facility] 3W/SW Attending Maureen Cardona Day # 7 PCP Radha Eric.  MD Blanca     HPI/Subjective: 01/17/2017    DDIMER >4.00 (H) 03/02/2018    ESRML 60 (H) 05/27/2019    CRP 5.64 (H) 05/27/2019    MG 1.7 03/20/2021    PHOS 3.4 03/19/2021    TROP 0.115 (HH) 03/14/2021    CK 45 09/25/2019    B12 1,058 (H) 03/02/2018       No results found.         Assessm

## 2021-03-20 NOTE — PROGRESS NOTES
[place] [facility]    Progress Note    Isidro Speaker Patient Status:  Inpatient    1945 MRN [id number]   Location [facility] 3W/SW Attending Maureen Cardonaissa Day # 7 PCP Franki Mathews.  MD Blanca       Subjective:   Eritre[place] Meth 03/13/2021    AST 9 (L) 03/13/2021    ALT 17 03/13/2021    PTT 30.8 02/01/2018    INR 1.1 02/01/2018    PT 14.2 07/23/2012    TSH 2.020 11/09/2020    PSA 1.0 01/17/2017    DDIMER >4.00 (H) 03/02/2018    ESRML 60 (H) 05/27/2019    CRP 5.64 (H) 05/27/2019

## 2021-03-21 NOTE — PLAN OF CARE
Problem: Patient Centered Care  Goal: Patient preferences are identified and integrated in the patient's plan of care  Description: Interventions:  - What would you like us to know as we care for you?  I bleed easily, even just a knick makes me bleed heav obtain 12 lead EKG if indicated  - Evaluate effectiveness of antiarrhythmic and heart rate control medications as ordered  - Initiate emergency measures for life threatening arrhythmias  - Monitor electrolytes and administer replacement therapy as ordered

## 2021-03-21 NOTE — PROGRESS NOTES
[place] [facility] - [facility]    Progress Note    Melida Atkinson Patient Status:  Inpatient    1945 MRN [id number]   Location [facility] 3W/SW Attending Maureen Cardona Prachi Day # 8 PCP Eboni Rios MD       Subjective:   Dre Coleman >4.00 (H) 03/02/2018    ESRML 60 (H) 05/27/2019    CRP 5.64 (H) 05/27/2019    MG 1.7 03/21/2021    PHOS 3.7 03/21/2021    TROP 0.115 (HH) 03/14/2021    CK 45 09/25/2019    B12 1,058 (H) 03/02/2018       No results found.           Juan Sewell MD  3/21/

## 2021-03-21 NOTE — PLAN OF CARE
Pt received in bed, aaox4, vss. Tele shows Afib w/ PVCs. Pt asked to have dressing removed on L knee, stating the dressing was making him itch, wound is scabbed and left FLAVIO per pts request/ Denies pain or discomfort @ this time.  Bed locked and in lowest color and temperature  - Assess for signs of decreased coronary artery perfusion - ex.  Angina  - Evaluate fluid balance, assess for edema, trend weights  Outcome: Progressing  Goal: Absence of cardiac arrhythmias or at baseline  Description: INTERVENTIONS: Spontaneous, unlabored and symmetrical

## 2021-03-21 NOTE — PROGRESS NOTES
[facility]    [facility] Note    Jorge Burn Patient Status:  Inpatient    1945 MRN [id number]   Location [facility] 3W/SW Attending Maureen Cardona Day # 8 PCP Natalie Styles.  MD Blanca     HPI/Subjective: DDIMER >4.00 (H) 03/02/2018    ESRML 60 (H) 05/27/2019    CRP 5.64 (H) 05/27/2019    MG 1.7 03/21/2021    PHOS 3.7 03/21/2021    TROP 0.115 (HH) 03/14/2021    CK 45 09/25/2019    B12 1,058 (H) 03/02/2018       No results found.         Assessment & Plan:

## 2021-03-21 NOTE — PHYSICAL THERAPY NOTE
PHYSICAL THERAPY TREATMENT NOTE - INPATIENT     Room Number: 337/337-A       Presenting Problem: acute on chronic CHF    Problem List  Principal Problem:    Acute on chronic congestive heart failure, unspecified heart failure type [facility])  Active Problems: performing transfers today, requiring Mod A x 2 from recliner. Pt is frustrated, feeling that he is getting worse instead of better. Used this opportunity to continue to recommend subacute rehab to further improve mobility.   Pt is refusing this and remai -   Moving from lying on back to sitting on the side of the bed?: A Little   How much help from another person does the patient currently need. ..   -   Moving to and from a bed to a chair (including a wheelchair)?: A Lot   -   Need to walk in hospital ro discharge.    Goal #4   Current Status IN PROGRESS

## 2021-03-21 NOTE — PROGRESS NOTES
Pt refusing to have bladder scan done, states \"We're not going there\".   When I explained the rationale behind bladder scan, stated \"I know I'm not peeing enough, but I don't care\"

## 2021-03-22 NOTE — PROGRESS NOTES
[place] [facility] - [facility]    Progress Note    Janay Randolph Patient Status:  Inpatient    1945 MRN [id number]   Location [facility] 3W/SW Attending Maureen Cardona Prachi Day # 9 PCP Ever Rios MD       Subjective:   Aldo Perez BILT 0.7 03/13/2021    TP 5.2 (L) 03/13/2021    AST 9 (L) 03/13/2021    ALT 17 03/13/2021    PTT 30.8 02/01/2018    INR 1.1 02/01/2018    PT 14.2 07/23/2012    TSH 2.020 11/09/2020    PSA 1.0 01/17/2017    DDIMER >4.00 (H) 03/02/2018    ESRML 60 (H) 05/27/ No

## 2021-03-22 NOTE — PLAN OF CARE
Patient resting well. No complaints of sob or dizziness when ambulating this evening. Patient having joint pain r/t gout flair. Patient takes steroids at home for gout flairs and wonders if more will be prescribed.  This RN told patient she would inform day optimize hemodynamic stability  - Monitor arterial and/or venous puncture sites for bleeding and/or hematoma  - Assess quality of pulses, skin color and temperature  - Assess for signs of decreased coronary artery perfusion - ex.  Angina  - Evaluate fluid b

## 2021-03-22 NOTE — PROGRESS NOTES
[facility]     Progress Note    Ever Dark Patient Status:  Inpatient    1945 MRN [id number]   Location [facility] 3W/SW Attending Maureen Cardona Day # 9 PCP Julian Sánchez. MD Blanca     Assessment:    1.   H Labs:  Lab Results   Component Value Date    WBC 3.6 03/22/2021    HGB 8.3 03/22/2021    HCT 26.9 03/22/2021    .0 03/22/2021     Lab Results   Component Value Date    PT 14.2 07/23/2012    INR 1.1 02/01/2018     Lab Results   Component Value Da

## 2021-03-22 NOTE — PROGRESS NOTES
[place] [facility] - [facility]    Progress Note    Juan Antonio Kirkland Patient Status:  Inpatient    1945 MRN [id number]   Location [facility] 3W/SW Attending Maureen Cardona Day # 9 PCP Radha Eric.  MD Blanca     HPI/Subjective: 1.0 01/17/2017    DDIMER >4.00 (H) 03/02/2018    ESRML 60 (H) 05/27/2019    CRP 5.64 (H) 05/27/2019    MG 1.9 03/22/2021    PHOS 5.0 (H) 03/22/2021    TROP 0.115 (HH) 03/14/2021    CK 45 09/25/2019    B12 1,058 (H) 03/02/2018       No results found.

## 2021-03-22 NOTE — PROGRESS NOTES
[facility] Pharmacy Note:  Renal Dose Adjustment for Tramadol Shoaib Oas)    Michael Vu has been prescribed Tramadol (ULTRAM) 50 mg orally every 6 hours as needed for pain. Estimated Creatinine Clearance: 23.1 mL/min (A) (based on SCr of 3.02 mg/dL (H)).     H

## 2021-03-23 NOTE — PLAN OF CARE
AO3, up with assist, lasix on hold due to elevated creatinine levels, EGD negative and refusal of any further workup for GI, medication adjustments per MD, PT recommending rehab however patient requesting discharge home.   No signs and symptoms of pain or s patient reports new pain  - Anticipate increased pain with activity and pre-medicate as appropriate  Outcome: Progressing

## 2021-03-23 NOTE — PROGRESS NOTES
[facility]     Progress Note    Isidro Speaker Patient Status:  Inpatient    1945 MRN [id number]   Location [facility] 3W/SW Attending Maureen Cardona Day # 10 PCP Franki Mathews. MD Blanca     Assessment:    1. 6.6 03/23/2021    HGB 8.5 03/23/2021    HCT 27.3 03/23/2021    .0 03/23/2021     Lab Results   Component Value Date    PT 14.2 07/23/2012    INR 1.1 02/01/2018     Lab Results   Component Value Date     03/23/2021    K 4.0 03/23/2021

## 2021-03-23 NOTE — PROGRESS NOTES
[facility]    Progress Note    Santa Green Patient Status:  Inpatient    1945 MRN [id number]   Location [facility] 3W/SW Attending Maureen Cardona [place] Day # 10 PCP Bob Ohara.  MD Blanca     HPI/Subjective: 01/17/2017    DDIMER >4.00 (H) 03/02/2018    ESRML 60 (H) 05/27/2019    CRP 5.64 (H) 05/27/2019    MG 1.9 03/23/2021    PHOS 4.7 03/23/2021    TROP 0.115 (HH) 03/14/2021    CK 45 09/25/2019    B12 1,058 (H) 03/02/2018       No results found.         Assessm care with nurse/pt/dr kapoor and counseling pt unsuccessfully about need for raf Jacinto MD  3/17/2021

## 2021-03-23 NOTE — PLAN OF CARE
Presley Gee Aox4, Ambulates with standby and walker, reported dizziness and feeling of low BP approximately 1 hour after getting isosorbide this am, was given scheduled midodrine as was time to receive medication, RA, uses bedside urinal, urology on consult, str temperature  - Assess for signs of decreased coronary artery perfusion - ex.  Angina  - Evaluate fluid balance, assess for edema, trend weights  Outcome: Progressing  Goal: Absence of cardiac arrhythmias or at baseline  Description: INTERVENTIONS:  - Contin

## 2021-03-24 NOTE — PROGRESS NOTES
[place] [facility] - [facility]    Progress Note    Ric Reid Patient Status:  Inpatient    1945 MRN [id number]   Location [facility] 3W/SW Attending Phyllis Liriano, [address] Day # 11 PCP Sheldon Andrews.  MD Blanca       Subjective:   Jonda Claude 20* 24*   GFRNAA 19* 18* 21*   CA 7.7* 8.0* 8.0*    137 138   K 4.4 4.0 4.3    101 103   CO2 24.0 27.0 28.0          XR KNEE (1 OR 2 VIEWS), LEFT (CPT=73560)    Result Date: 3/24/2021  CONCLUSION:  1. Demineralization. 2. Osteoarthritis.  3. Cho

## 2021-03-24 NOTE — SPIRITUAL CARE NOTE
Pt sitting chair. Pt not interested in talking with . Indicated he was ok and had no issues with his procedure tomorrow. Informed of  availability if needed. Rev. Kathryn Madrigal  Ext. [phone]

## 2021-03-24 NOTE — DIETARY NOTE
ADULT NUTRITION INITIAL ASSESSMENT    Pt is at moderate nutrition risk. Pt does not meet malnutrition criteria.       RECOMMENDATIONS TO MD:  See Nutrition Intervention    ADMITTING DIAGNOSIS:   Gastrointestinal hemorrhage, unspecified gastrointestinal hem enough protein at home and feels very restricted with his options at hospital. Agreeable to double protein + Propass mixed in with oatmeal due to not ordering protein with breakfast. Stable wt.      FOOD/NUTRITION RELATED HISTORY:  Appetite: Good  Intake: 9 185.4 cm (6' 1\")  WT: 87.5 kg (192 lb 12.8 oz)   BMI: Body mass index is 25.44 kg/m².   BMI CLASSIFICATION: 25-29.9 kg/m2 - overweight  IBW: 184 lbs        104% IBW  Usual Body Wt: 198 lbs       96% UBW    WEIGHT HISTORY:  Patient Weight(s) for the past 33 for wound healing    - Coordination of nutrition care: collaboration with other providers  - Discharge and transfer of nutrition care to new setting or provider: to be determined    MONITOR AND EVALUATE/NUTRITION GOALS:  - Food and Nutrient Intake:       Kylie People

## 2021-03-24 NOTE — PHYSICAL THERAPY NOTE
Chart reviewed for treatment. RN contacted and reports he did request to walk this morning with staff and did walk in room, reporting \"I have to keep moving\". He is scheduled for a suprapubic catheter placement tomorrow.  Currently he needs bladder scan a

## 2021-03-24 NOTE — CONSULTS
[facility] ADULT HISTORY AND PHYSICAL      IDENTIFYING DATA  Patient is Chelsie Chou a 66year old male. MRN is [id number]    Admitting physician: Meagan Galvan  Primary care physician: Elaine Todd.  MD Blanca    CHIEF COMPLAINT  Patient presents with:  [facility] PAST SURGICAL HISTORY  Past Surgical History:   Procedure Laterality Date   • CATH PERCUTANEOUS  TRANSLUMINAL CORONARY ANGIOPLASTY  1994    per [employer]:  \"Ischemic heart disease\";  \"Management:  PTCA (1994)\"   • COLONOSCOPY  09-,01/2015 Drug use: No      Sexual activity: Not on file    Other Topics      Concerns:         Service: Not Asked        Blood Transfusions: Not Asked        Caffeine Concern: Yes          coffee, 1-2 cups daily        Occupational Exposure: Not Asked Place 1 Application rectally 2 (two) times daily.  (Patient taking differently: Place 1 Application rectally 2 (two) times daily as needed.  ), Disp: 1 Tube, Rfl: 1  SIMVASTATIN 40 MG Oral Tab, TAKE ONE TABLET BY MOUTH DAILY, Disp: 90 tablet, Rfl: 3  Vitami Constitutional: General appearance: appears well, alert and oriented x 3, pleasant and cooperative.   Neuro: No gross deficits, normal gait  Skin: Normal color, turgor  HEENT: Neck supple  Chest: Normal respiratory effort  CV: Normal radial pulse  Abd

## 2021-03-24 NOTE — PROGRESS NOTES
Chart reviewed and patient examined. Blood pressures are stable. No orthostatic symptoms. Feels like his legs are getting stronger. Creatinine has peaked and now trending down. Hemoglobin stable. Telemetry shows atrial fib with stable heart rates.

## 2021-03-25 NOTE — PROGRESS NOTES
[facility]    Progress Note    J Carlos Aguilar Patient Status:  Inpatient    1945 MRN [id number]   Location [facility] 3W/SW Attending Gt Reyes, [address] [facility] Day # 12 PCP Rosendo Rios MD       Subjective:   Sandi Rollins 216.0 03/25/2021    CREATSERUM 2.59 (H) 03/25/2021    BUN 77 (H) 03/25/2021     03/25/2021    K 4.4 03/25/2021     03/25/2021    CO2 27.0 03/25/2021     (H) 03/25/2021    CA 8.2 (L) 03/25/2021    ALB 2.4 (L) 03/25/2021    ALKPHO 64 03/13 diastolic congestive heart failure  - echo in 2015 with normal EF.  Grade 3 DD ; mod pulm htn   - repeat echo with EF 44% ; will need outpt st per cards   Lasix only after blood  - strict I/o and wts   - elec protocol   - apprec cards and renal consults  - Spoke with consultant. All questions answered.

## 2021-03-25 NOTE — PROGRESS NOTES
[place] [facility] - [facility]     Progress Note    Equilla Sor Patient Status:  Inpatient    1945 MRN [id number]   Location [facility] 3W/SW Attending Alex Luis, [address] Day # 12 PCP Shelley Macias. MD Blanca     Assessment:    1.   Graeme Almanza 03/25/2021    HGB 8.7 03/25/2021    HCT 27.8 03/25/2021    .0 03/25/2021     Lab Results   Component Value Date    PT 14.2 07/23/2012    INR 1.1 02/01/2018     Lab Results   Component Value Date     03/25/2021    K 4.4 03/25/2021     03/

## 2021-03-25 NOTE — PROGRESS NOTES
[place] [facility] - [facility]    Progress Note    J Carlos Aguilar Patient Status:  Inpatient    1945 MRN [id number]   Location [facility] 3W/SW Attending Gt Reyes, [address] [facility] Day # 11 PCP Rosendo Rios MD       Subjective:   Sandi Rollins Sodium (PROTONIX) EC tab 40 mg, 40 mg, Oral, QAM AC  atorvastatin (LIPITOR) tab 20 mg, 20 mg, Oral, Nightly        Lab Results   Component Value Date    WBC 7.0 03/24/2021    HGB 8.3 (L) 03/24/2021    HCT 26.6 (L) 03/24/2021    .0 03/24/2021    CREA 66* 77* 82*   CREATSERUM 3.02* 3.20* 2.80*   GFRAA 22* 20* 24*   GFRNAA 19* 18* 21*   CA 7.7* 8.0* 8.0*    137 138   K 4.4 4.0 4.3    101 103   CO2 24.0 27.0 28.0             Assessment and Plan:        Acute on chronic diastolic congestive hea counseling patient, discussing plan of care, discussing labs and imaging findings. Spoke with consultant. All questions answered.          3/24/2021

## 2021-03-25 NOTE — PHYSICAL THERAPY NOTE
PHYSICAL THERAPY TREATMENT NOTE - INPATIENT     Room Number: 337/337-A       Presenting Problem: acute on chronic CHF    Problem List  Principal Problem:    Acute on chronic congestive heart failure, unspecified heart failure type [facility])  Active Problems: Recommendations: Sub-acute rehabilitation     PLAN  PT Treatment Plan: Bed mobility; Body mechanics; Endurance; Energy conservation;Patient education; Family education;Gait training;Neuromuscular re-educate;Range of motion;Strengthening;Stoop training;Stair tr tested  Comment : .    THERAPEUTIC EXERCISES  Lower Extremity Alternating marching  Knee extension     Position Sitting       Patient End of Session: Up in chair;Needs met;Call light within reach;RN aware of session/findings; All patient questions and guy

## 2021-03-25 NOTE — PROGRESS NOTES
[facility]    Progress Note    Juan Antonio Kirkland Patient Status:  Inpatient    1945 MRN [id number]   Location [place] [address] Attending Benton Martinez, [address] Day # 12 PCP Radha Eric.  MD Blanca       Subjective: 4.0 4.3 4.4    103 105   CO2 27.0 28.0 27.0          XR KNEE (1 OR 2 VIEWS), LEFT (CPT=73560)    Result Date: 3/24/2021  CONCLUSION:  1. Demineralization. 2. Osteoarthritis. 3. Chondrocalcinosis. 4. Atherosclerosis. Dictated by (CST):  Naina Mishra

## 2021-03-25 NOTE — ANESTHESIA POSTPROCEDURE EVALUATION
Patient: Santa Green    Procedure Summary     Date: 03/25/21 Room / Location: [facility] OR  / [facility] OR    Anesthesia Start: 2746 Anesthesia Stop: 0367    Procedures:       CYSTOSCOPY (N/A )      CATHETER INSERTION SUPRA PUBIC (N/A ) Diagnosis: (urin

## 2021-03-25 NOTE — ANESTHESIA PREPROCEDURE EVALUATION
Anesthesia PreOp Note    HPI:     Rosemary Mccauley is a 66year old male who presents for preoperative consultation requested by:  Michelle Brewer MD    Date of Surgery: 3/13/2021 - 3/25/2021    Procedure(s):  CYSTOSCOPY  CATHETER INSERTION SUPRA PUBIC  Ind Date Noted: 12/02/2013      Occlusion and stenosis of carotid artery         Date Noted: 12/02/2013      Anemia in chronic kidney disease         Date Noted: 10/04/2013      Iron deficiency anemia         Date Noted: 07/16/2013      Hypertension, benign • ESOPHAGOGASTRODUODENOSCOPY (EGD) N/A 3/16/2021    Performed by Ruth Pizarro MD at [address] [place] [address] ENDOSCOPY   • ESOPHAGOGASTRODUODENOSCOPY (EGD) N/A 1/13/2017    Performed by Frank Armas MD at [address] [place] [address] ENDOSCOPY   • OTHER SURGICAL HISTORY  3/17/2016    [address] [place] [address] (COLYTE WITH FLAVOR PACKS) 240 g Oral Recon Soln, Take prep as directed by gastro office. May substitute with Trilyte/generic equivalent if needed, Disp: 1 Bottle, Rfl: 0  [DISCONTINUED] ASPIRIN 81 MG Oral Tab, Take 1 tablet (81 mg total) by mouth nightly. PRN, Jonny Rocha DO  [facility]] finasteride (PROSCAR) tab 5 mg, 5 mg, Oral, Daily, Jonny Rocha DO, 5 mg at 03/25/21 1054  [facility]] folic acid (FOLVITE) tab 1 mg, 1 mg, Oral, Daily, Jonny Rocha DO, 1 mg at 03/25/21 4620  [facility]] Hina Types: 6 Standard drinks or equivalent per week        Comment: drinks occasionally      Drug use: No      Sexual activity: Not on file    Other Topics      Concerns:         Service: Not Asked        Blood Transfusions: Not Asked        Katelyn 03/25/2021     03/25/2021    CO2 27.0 03/25/2021    BUN 77 (H) 03/25/2021    CREATSERUM 2.59 (H) 03/25/2021     (H) 03/25/2021    CA 8.2 (L) 03/25/2021          Vital Signs: Body mass index is 25.07 kg/m².    height is 1.854 m (6' 1\") and sal

## 2021-03-25 NOTE — BRIEF OP NOTE
BRIEF PROCEDURE NOTE    PATIENT: Bridgett Mueller; [facility]:[id number]; : 1945  DATE OF SURGERY: 3/25/2021    PREOP DIAGNOSIS:  urinary retention  POSTOP DIAGNOSIS:  same  PROCEDURE:  cystoscopy with suprapubic tube insertion  SURGEONS:  Marie Mendoza,

## 2021-03-25 NOTE — CONSULTS
[facility]    Report of EP Consultation    Ric Hair Patient Status:  Inpatient    1945 MRN [id number]   Location [facility] 3W/SW Attending Phyllis Liriano, [address] [facility] Day # 12 PCP Sheldon Andrews.  MD Blanca     Date of Admis History  Past Medical History:   Diagnosis Date   • Anemia     per NextGen:  \"Anemia mild secondary to gastritis\"   • Anemia in chronic kidney disease 10/4/2013   • Arrhythmia     Afib   • Atherosclerosis of coronary artery    • Quevedo's esophagus    • THORACOSCOPY/VATS Left 1/31/2018    Performed by Cate Nicole MD at [address] [place] [address] MAIN OR   • UPPER GI ENDOSCOPY,BIOPSY  10/29/13,09/19/2012       Family History  Family History   Problem Relation Age of Onset   • Heart Disease Father         per NextGen:  \" daily. (Patient taking differently: Place 1 Application rectally 2 (two) times daily as needed.  )  SIMVASTATIN 40 MG Oral Tab, TAKE ONE TABLET BY MOUTH DAILY  Vitamin B-12 (VITAMIN B12) 1000 MCG Oral Tab, Take  by mouth.  take 1 by Oral route in the [facility] distress. HEENT: Normocephalic, anicteric sclera, neck supple  Neck: No JVD, carotids 2+,  Cardiac: Regular rate and rhythm. No pathologic murmur. Lungs: Clear with normal effort. Normal excursions and effort. Abdomen: Soft, non-tender. BS-present.   Ex

## 2021-03-25 NOTE — PLAN OF CARE
Patient continues to have mild pain/discomfort with left knee/leg. He told this RN he thinks its \"the gout. \" Patient refuses damien hose. Plan for surgery today for suprapubic catheter. PT recs CAMRYN at [place]. Will continue to monitor.      Problem: Patient Kirstie Angina  - Evaluate fluid balance, assess for edema, trend weights  Outcome: Progressing  Goal: Absence of cardiac arrhythmias or at baseline  Description: INTERVENTIONS:  - Continuous cardiac monitoring, monitor vital signs, obtain 12 lead EKG if indicated

## 2021-03-25 NOTE — PHYSICAL THERAPY NOTE
Chart reviewed pt, pt away at procedure. Will re-attempt later this PM if pt is medically appropriate and willing.      Gerardo Baires, PT, DPT

## 2021-03-25 NOTE — ANESTHESIA PROCEDURE NOTES
Airway  Date/Time: 3/25/2021 11:32 AM  Urgency: Elective    Airway not difficult    General Information and Staff    Patient location during procedure: OR  Anesthesiologist: Dilcia Vázquez MD  Performed: anesthesiologist     Indications and Patient Deretha Endy

## 2021-03-26 NOTE — PLAN OF CARE
Problem: Patient Centered Care  Goal: Patient preferences are identified and integrated in the patient's plan of care  Description: Interventions:  - What would you like us to know as we care for you?  I bleed easily, even just a knick makes me bleed heav lead EKG if indicated  - Evaluate effectiveness of antiarrhythmic and heart rate control medications as ordered  - Initiate emergency measures for life threatening arrhythmias  - Monitor electrolytes and administer replacement therapy as ordered  Outcome:

## 2021-03-26 NOTE — PROGRESS NOTES
[facility]    Cardiology Progress Note    Bridgtet Mueller Patient Status:  Inpatient    1945 MRN [id number]   Location [facility] 3W/SW Attending Blayne Quick, [address] [facility] Day # 15 PCP Seymour Rios MD     3/26/2021    A 12.8 oz (87.5 kg)  03/21/21 0455 : 198 lb (89.8 kg)  03/20/21 0611 : 195 lb 12.8 oz (88.8 kg)  03/19/21 0456 : 193 lb 6.4 oz (87.7 kg)  03/18/21 0500 : 195 lb 4.8 oz (88.6 kg)  03/17/21 0540 : 193 lb 4.8 oz (87.7 kg)  03/16/21 1501 : 197 lb (89.4 kg)  03/1 122* 114*    < > = values in this interval not displayed. Recent Labs   Lab 03/21/21  0448 03/22/21  0513 03/23/21  0432 03/24/21  0505 03/25/21  0509   ALB 2.4* 2.2* 2.5* 2.3* 2.4*       No results for input(s): TROP in the last 168 hours.     Allerg

## 2021-03-26 NOTE — PHYSICAL THERAPY NOTE
Attempted physical therapy treatment. Patient presented in bedside chair. Per chart and nursing, plan is for patient to discharge home on this date. Patient with no concerns for returning home and has been ambulating with nursing staff.  Will have family as

## 2021-03-26 NOTE — PROGRESS NOTES
[facility]    Progress Note    Miguel Cottrell Patient Status:  Inpatient    1945 MRN [id number]   Location [facility] 3W/SW Attending Kyleigh Benson, [address] [facility] Day # 15 PCP Cora Rios MD       Subjective:   Jean Perrin found.        Mariann Mckeon MD  3/26/2021

## 2021-03-26 NOTE — DISCHARGE SUMMARY
[facility]    Discharge Summary    Padilla Delgado Patient Status:  Inpatient    1945 MRN [id number]   Location [facility] 3W/SW Attending Alex Luis, [address] [facility] Day # 15 PCP Shelley Macias.  MD Blanca     Date of Admission: Gastrointestinal hemorrhage, unspecified gastrointestinal hemorrhage type     Anemia, unspecified type     Hemorrhoids      Reason for Admission: sob     Physical Exam:   General appearance: alert, appears stated age and cooperative  Pulmonary:  clear to because of hypotension and dizziness  - cbc daily   - hold xarelto   - hb 8.7     Positive troponin , ekg with afib controlled rate   - repeat cm's flat   - echo as above ; hypokinesis, st outpt   - tele   - cards      Acute renal failure on ckd-4 hold las tablet (5 mg total) by mouth daily. Quantity: 30 tablet  Refills: 0     carvedilol 3.125 MG Tabs  Commonly known as: COREG      Take 1 tablet (3.125 mg total) by mouth 2 (two) times daily with meals.    Quantity: 60 tablet  Refills: 2     hydrALAzine HCl tablet  Refills: 3     tamsulosin HCl 0.4 MG Caps  Commonly known as: FLOMAX      Take 1 capsule (0.4 mg total) by mouth daily. Quantity: 90 capsule  Refills: 3     Vitamin B-12 1000 MCG Tabs  Commonly known as: VITAMIN B12      Take  by mouth.  take 1 by

## 2021-03-26 NOTE — HOME CARE LIAISON
Notified by Celestine that pt is ready for discharge today. Spoke w/ pt - pt still declining HH at this time. Notified Celestine.

## 2021-03-26 NOTE — PLAN OF CARE
Patient resting well overnight. Pain medication given for left knee. Patient is eager to dc home today. Will continue to monitor.        Problem: Patient Centered Care  Goal: Patient preferences are identified and integrated in the patient's plan of care  D Absence of cardiac arrhythmias or at baseline  Description: INTERVENTIONS:  - Continuous cardiac monitoring, monitor vital signs, obtain 12 lead EKG if indicated  - Evaluate effectiveness of antiarrhythmic and heart rate control medications as ordered  - I

## 2021-03-29 NOTE — PROGRESS NOTES
Initial Post Discharge Follow Up   Discharge Date: 3/26/21  Contact Date: 3/29/2021    Consent Verification:  Assessment Completed With: Patient  HIPAA Verified?   Yes    Discharge Dx:     chf, urinary retention     Was TCC ordered: no         General: and 5- Very well   o Very well  • Do you have any questions about your discharge instructions? No  • Before leaving the hospital was your diagnoses explained to you? Yes  • Do you have any questions about your diagnoses?  No  • Are you able to perform norm with Trilyte/generic equivalent if needed 1 Bottle 0   • Vitamin B-12 (VITAMIN B12) 1000 MCG Oral Tab Take  by mouth.  take 1 by Oral route in the morning     • Ferrous Sulfate 325 (65 FE) MG Oral Tab Take 325 mg by mouth daily with breakfast.       • folic appointments:      Your appointments     Date & Time Appointment Department [facility])    Apr 09, 2021 12:10 PM CDT COVID-19 2nd dose Moderna with [address] ([address]) Employees of [employer] version  [email or web address] pdf      May 05, 2021  1:30 PM CDT Exam - Established with Chapincito Bergman Advised patient to bring all medications and blood glucose meter/supplies if applicable.

## 2021-03-29 NOTE — PROGRESS NOTES
NCM placed call to patient for TCM, LM requesting a call to [phone] back with a condition update. 37.1

## 2021-03-29 NOTE — PROGRESS NOTES
1st attempt SCC/HF and [facility] DIVISION apt request    [facility]  [address]  [address] [facility]  [address] [facility]  Jarett Zaacrias

## 2021-03-29 NOTE — TELEPHONE ENCOUNTER
Patient has been discharged. LMTCB to schedule f/u appointment to be seen in clinic. Phone room-    If patient calls back, please ask if the time and date left on VM would work.  (4/15/21 at 2:30 PM with Trice)    Thank you

## 2021-03-30 NOTE — PROGRESS NOTES
2nd attempt [facility]/[place] and [place] apt request     [facility]  [address]  [address] [facility] Special[facility]  [address] [facility] [address] [facility]  Glenn Gonzalez

## 2021-03-31 NOTE — PROGRESS NOTES
3rd attempt SCC/HF and [place] [facility] apt request     [facility]  [address]  [address] [facility]  [address] [facility] [address] [facility]

## 2021-04-01 NOTE — OPERATIVE REPORT
[facility]    PATIENT'S NAME: Reno Johnson   ATTENDING PHYSICIAN: Nadia Ziegler DO   OPERATING PHYSICIAN: Audrey Hand MD   PATIENT ACCOUNT#:   [de-identified]    LOCATION:  [address] [place] [address] #:   [id number]       DATE OF BIRTH:  1 the spinal needle and made an incision at this location after instillation with 1% lidocaine. I then advanced a one-step Cook suprapubic tube introducer, trocar with a peel-away sheath intact and this was advanced under direct vision into the bladder.   I

## 2021-04-01 NOTE — TELEPHONE ENCOUNTER
Trice    There are no longer appointments available in the below timeframe provided by Andi hernandez. Would it be ok to book patient for week of April 21st when there are available appointments or do you want to add him on to a sooner date?     Thank you

## 2021-04-01 NOTE — TELEPHONE ENCOUNTER
Nursing: That date should be fine. The below notes indicate that a follow-up in 2-4 weeks would be reasonable.

## 2021-04-01 NOTE — TELEPHONE ENCOUNTER
Appt date and time offered is no longer available. Is there an alternative date and time I may offer the pt?       Thank You

## 2021-04-01 NOTE — TELEPHONE ENCOUNTER
I called the patient to help arrange post hospital follow up appointment. He told me that he wanted to see his PCP and arrange follow ups with cardiology and urology before making an appointment with our office. I advised that we do book out a few weeks in advance and we wanted to make sure he gets the recommended follow up. Patient told me he would call back to make an appointment when ready. He then said good bye and then some foul language before hanging up the phone.

## 2021-04-02 PROBLEM — M10.062 ACUTE IDIOPATHIC GOUT OF LEFT KNEE: Status: ACTIVE | Noted: 2021-01-01

## 2021-04-02 NOTE — TELEPHONE ENCOUNTER
----- Message from Yadira Smith MD sent at 4/2/2021  3:37 PM CDT -----  Long segment nondysplastic barretts  Recall EGD in 3 years  Continue PPI

## 2021-04-02 NOTE — PROGRESS NOTES
Melida Atkinson is a 66year old male. HPI:   He was hospitalized at [facility] from March 13 and discharged March 26.   He was admitted on March 13, the day after having a Covid vaccine and thinks he had some kind of reaction to the vaccine (94.8 kg)  07/20/20 : 205 lb (93 kg)    Current Outpatient Medications   Medication Sig Dispense Refill   • predniSONE 10 MG Oral Tab Take 1 tablet (10 mg total) by mouth daily.  100 tablet 2   • amLODIPine Besylate 5 MG Oral Tab Take 1 tablet (5 mg total) Atherosclerosis of coronary artery    • Quevedo's esophagus    • BPH (benign prostatic hyperplasia)    • Carotid artery obstruction     per NextGen:  \"100% obstruction RT ICA; 50-60% stenosis L\"   • Chronic ischemic heart disease 9/26/2012   • Colon poly kg)   SpO2 98%   BMI 25.57 kg/m²   GENERAL: well developed, well nourished,in no apparent distress  SKIN: no rashes,no suspicious lesions  HEENT: atraumatic, normocephalic,ears and throat are clear  NECK: supple,no adenopathy,no bruits  LUNGS: clear to [employer] at least at this time. Upper endoscopy was performed and was negative. Lower endoscopy is pending. The patient has bleeding hemorrhoids. He does not wish any colonoscopy at this time. 7. Acute idiopathic gout of left knee  See above    8.  Acute rojelio

## 2021-04-02 NOTE — TELEPHONE ENCOUNTER
Entered into Epic. Recall EGD in 3 years per Dr. Alicia Pate. Last EGD done 03/16/2021 while inpatient. Recall entered into Patient Outreach for 03/16/2024. Specialty Comments updated.      Patient receiving Pantoprazole 40 MG RX through PCP for medical managemen

## 2021-04-05 NOTE — TELEPHONE ENCOUNTER
New home health orders placed and faxed to [facility] [phone] along with last office visit notes, face sheet, and home health referral.

## 2021-04-05 NOTE — TELEPHONE ENCOUNTER
Please phone Raquel [facility]. They have called him several times to set up a home visit since his discharge from the hospital a week or so ago. He has declined but he is ready to accept home health services including home physical therapy.   Maximilian

## 2021-04-05 NOTE — TELEPHONE ENCOUNTER
Dr Zakiya Garcia, will need new referral sent to [facility]. RN/PT/OT eval and treat okay? Did you want ST as well?

## 2021-04-06 NOTE — TELEPHONE ENCOUNTER
Erin Singleton / [facility] admitted for [facility] care, is it ok to add social work & HH aid  Is Dr Bry Garcia ok with using [facility] for sacral wounds?

## 2021-04-07 NOTE — TELEPHONE ENCOUNTER
Aj Adams 17 called  Pt will be seen for Home Physical therapy   1x per week for 5 weeks  No call back needed   Tasked to nursing

## 2021-04-12 NOTE — TELEPHONE ENCOUNTER
Contacted patient to follow up. Verified . Patient states he is still taking Pantoprazole 40 mg daily per prescribed by PCP. Instructed patient to continue taking per s/p EGD and recommendations per Dr. Kailee Medina.      Patient verbalized understandin

## 2021-04-13 NOTE — TELEPHONE ENCOUNTER
Please call Rosita/[facility]  Pt mentioned Dr Maggie Jackson would like labs drawn  Please call Mendel Mares (ok to leave message) with labs and when they should be drawn  Tasked to nursing

## 2021-04-15 NOTE — TELEPHONE ENCOUNTER
Labs 4/15 reviewed; creatinine 2.09, K+ 5.6    Pt is not taking potassium supplements    Pt called; pt states he eats one orange and one banana daily;      Imp- mild hyperkalemia; CKD    Rec- stop eating the daily orange and the daily banana for now; re-fartun

## 2021-04-20 NOTE — TELEPHONE ENCOUNTER
The patient phoned me at about 8 AM.  He has had extreme pain in both tibial regions for the last 2 days. Pain is extreme when he stands less so when he sits or is supine.   I offered to have him go to the emergency room for evaluation and venous Dopplers

## 2021-04-20 NOTE — TELEPHONE ENCOUNTER
Gloria Goldman called from [facility] to let us know that she did receive the order for lab work.

## 2021-04-23 PROBLEM — M10.9 GOUTY ARTHRITIS: Status: ACTIVE | Noted: 2021-01-01

## 2021-04-23 PROBLEM — L03.116 LEFT LEG CELLULITIS: Status: ACTIVE | Noted: 2021-01-01

## 2021-04-23 NOTE — ED QUICK NOTES
rec'd care of pt from ems. Pt c/o pain and swelling to bilateral feet and left hand. history of gout. #20 g iv started to rac, blood drawn and sent to lab. Morphine given for pain. On continuous monitoring. A fib on monitor. covid swab sent to lab.  Roxanne roper

## 2021-04-23 NOTE — CONSULTS
[facility] Cardiology Consult    Reason for Consultation:  Heart failure    History of Present Illness:  Austin Dahl is a 66year old male followed by Dr. Agustin Grider who was just discharged from [facility] March 26th after admit for GI bleed, AFib • Stroke [facility]) 1993   • UTI (urinary tract infection) 08/2017     Past Surgical History:   Procedure Laterality Date   • CATH PERCUTANEOUS  TRANSLUMINAL CORONARY ANGIOPLASTY  1994    per NextGen:  \"Ischemic heart disease\";  \"Management:  PTCA (1994)\" Neuro:awake/alert PAINTING with weakness left hand with gout  HEENT: no JVD bilateral carotid bruits  Cardiac:S1 S2 irregularly irregular soft systolic murmur  Lungs: clear equal no crackles or wheezing  Abdomen: Soft, nontender, nondistended with suprapubic follow  · Recommended NH last visit patient refuses        Kaiden Burgos MD  [address] [facility] Floor Cardiology. Interventional Cardiology.   [phone]

## 2021-04-23 NOTE — ED NOTES
Orders for admission, patient is aware of plan and ready to go upstairs. Any questions, please call ED CHRISTOPHER crump   at extension [phone].    Type of COVID test sent:rapid, negative  COVID Suspicion level: Low    Titratable drug(s) infusing:saline locked  Rate

## 2021-04-23 NOTE — ED PROVIDER NOTES
Patient Seen in: [facility] AND [facility] Emergency Department      History   Patient presents with:  Swelling Edema    Stated Complaint: gout    HPI/Subjective:   HPI    66-year-old male with past medical history significant for anemia, chronic kidney disease, deficiency anemia 7/16/2013   • Ischemic heart disease     per NextGen:  \"Ischemic heart disease\";  \"Management:  PTCA (1994)\"   • Kidney disease 07/05/2013   • Kidney disorder    • Lipid screening 05-    per NextGen Order Module   • Neutropenia erythema. Moderate tenderness to palpation of the left ankle joint. Normal range of motion. No deformity. Lymphadenopathy: No sig cervical LAD   Neurological: Awake, alert. Normal reflexes. No cranial nerve deficit. Skin: Skin is warm and dry.  No ra LAVENDER   RAINBOW DRAW LIGHT GREEN   RAINBOW DRAW GOLD     EKG shows heart rate of 82 bpm, atrial fibrillation with normal ventricular response, frequent PVCs, no acute ST changes, nonspecific QRS widening.      XR CHEST AP PORTABLE  (CPT=69248)    Result Disposition:  Admit  4/23/2021  2:10 pm    Follow-up:  No follow-up provider specified.   We recommend that you schedule follow up care with a primary care provider within the next three months to obtain basic health screening including reassessment of

## 2021-04-23 NOTE — ED INITIAL ASSESSMENT (HPI)
Swelling to bilateral lower legs \"for weeks,\" pain to left foot. States that he cannot walk due to swelling.

## 2021-04-23 NOTE — H&P
[facility]    PATIENT'S NAME: Christine SANABRIA   ATTENDING PHYSICIAN: Jose Dyer MD   PATIENT ACCOUNT#:   [id number]    LOCATION:  [place] [address]  MEDICAL RECORD #:   [id number]       YOB: 1945  ADMISSION DATE:       04/23/2 had hypertension and breast cancer. Father had coronary artery disease and hypertension. Sister has lung cancer. SOCIAL HISTORY:  Ex-tobacco user. No current tobacco, alcohol, or drug use. Lives by himself.   At baseline independent in his basic acti clear open wounds; some skin abrasions noted. Overall poor hygienic state. NEUROLOGIC:  Motor and sensory intact. ASSESSMENT:    1. Acute on chronic diastolic heart failure, most likely right-sided heart failure.   2.   Gout attack, left upper extre

## 2021-04-23 NOTE — PROGRESS NOTES
[facility] Dosing Service    Initial Pharmacokinetic Consult for Vancomycin Dosing     Odalis Anderson is a 66year old patient who is being treated for cellulitis.   Pharmacy has been asked to dose Vancomycin by Dr. Lydia Olivier    Allergies:  Allopurinol

## 2021-04-24 NOTE — PLAN OF CARE
4/23 @ 6641 Pt.had runs of AIVR to V-tach,5-16 beats rate of ,frequent PVC's,couplets and triplets. Pt.asymptomatic.  notified,with orders for Mg level and follow electrolyte protocol. Will continue to monitor pt.

## 2021-04-24 NOTE — DIETARY NOTE
ADULT NUTRITION INITIAL ASSESSMENT    Pt is at moderate nutrition risk. Pt does not meet malnutrition criteria.       RECOMMENDATIONS TO MD:  See Nutrition Intervention    ADMITTING DIAGNOSIS:   Gouty arthritis [M10.9]  Left leg cellulitis [I27.084]  Acute HISTORY:  Appetite: Good  Intake: 100%  Intake Meeting Needs: Yes, and supplements to maximize  Percent Meals Eaten (last 3 days)     Date/Time Percent Meals Eaten (%)    04/24/21 0830  100 %    Percent Meals Eaten (%): oatmeal at 04/24/21 0830 UBW    WEIGHT HISTORY:  Patient Weight(s) for the past 336 hrs:   Weight   04/24/21 0449 89.1 kg (196 lb 8 oz)   04/23/21 1210 88.9 kg (196 lb)     Wt Readings from Last 10 Encounters:  04/24/21 : 89.1 kg (196 lb 8 oz)  04/02/21 : 87.9 kg (193 lb 12.8 oz)

## 2021-04-24 NOTE — PROGRESS NOTES
· [facility] Cardiology      Subjective:  No dyspnea, feel leg swelling markedly improved, still with gout pain    Objective:  /71 (BP Location: Right arm)   Pulse 62   Temp 98.1 °F (36.7 °C) (Oral)   Resp 18   Ht 6' 1\" (1.854 m)   Wt 196 lb 8 oz obstructive disease. Plans for outpatient stress test not yet done  · CKD creatinine stable with diuresis. · AFib permanent. Beta blocker was held with bradycardia in March. He declined pacemaker last admission. Rates are controlled, no bradycardia.   Xa

## 2021-04-24 NOTE — PHYSICAL THERAPY NOTE
Attempted PT eval,pt declined stating pain in left wrist and left lower leg 10/10 with mobility, wt bearing. rn aware. Will follow up 4/25, rn and pt aware.

## 2021-04-24 NOTE — PLAN OF CARE
Problem: CARDIOVASCULAR - ADULT  Goal: Maintains optimal cardiac output and hemodynamic stability  Description: INTERVENTIONS:  - Monitor vital signs, rhythm, and trends  - Monitor for bleeding, hypotension and signs of decreased cardiac output  - Evaluate night.Had 15-32 beats AIVR to V tach,asymptomatic. Call light within reach. Instructed to call for assistance. Will continue to monitor pt.

## 2021-04-24 NOTE — PLAN OF CARE
CONTINUING IV ANTIBIOTICS, IV BUMEX, FLUID RESTRICTION, IV STEROIDS, PLAN FOR PHYSICAL AND OCCUPATIONAL THERAPY CONSULTS AND WOUND CARE CONSULT,     Problem: CARDIOVASCULAR - ADULT  Goal: Absence of cardiac arrhythmias or at baseline  Description: Meredith Contreras output  - Evaluate effectiveness of vasoactive medications to optimize hemodynamic stability  - Monitor arterial and/or venous puncture sites for bleeding and/or hematoma  - Assess quality of pulses, skin color and temperature  - Assess for signs of decrea

## 2021-04-24 NOTE — CONSULTS
[facility]    Report of Consultation    Caroline Wall Patient Status:  Inpatient    1945 MRN [id number]   Location [facility] 3W/SW Attending Marquis Stacey MD   Hosp Day # 1 PCP Martell Gonzales.  MD Blanca     Date of Admission: 60 ml/min\"   • High blood pressure    • High cholesterol    • History of blood transfusion 03/2016   • History of thoracentesis 08/07/2017       • Hyposmolality and/or hyponatremia 8/14/2012   • Iron deficiency anemia 7/16/2013   • Ischemic heart diseas Use: Never used    Alcohol use:  Yes      Alcohol/week: 6.0 standard drinks      Types: 6 Standard drinks or equivalent per week      Comment: drinks occasionally    Drug use: No      Current Medications:  methylPREDNISolone Sodium Succ (Solu-MEDROL) inject total) by mouth every 6 (six) hours as needed for Pain. Hydrocortisone (ANUSOL-HC) 2.5 % External Cream, Place 1 Application rectally 2 (two) times daily. predniSONE 10 MG Oral Tab, Take 1 tablet (10 mg total) by mouth daily.   amLODIPine Besylate 5 MG Or SpO2 98%   BMI 25.93 kg/m²      Intake/Output Summary (Last 24 hours) at 4/24/2021 1102  Last data filed at 4/24/2021 0635  Gross per 24 hour   Intake 520 ml   Output 1500 ml   Net -980 ml     Wt Readings from Last 1 Encounters:  04/24/21 : 196 lb 8 oz (89 + guaiac stools last time and declined endoscopy  May need to revisit    4 - LE bon  On vanco  Thank you for allowing me to participate in the care of your patient.     Nnamdi Burnette  4/24/2021

## 2021-04-24 NOTE — OCCUPATIONAL THERAPY NOTE
Order rec'd and chart reviewed, attempted to see Pt for OT eval after nurse Burnett Cassette authorized Pt participation,  PPE worn by therapist: gloves, mask,goggles; visitor wore mask.  Pt was in bed and stated that he was in 10/10 pain in LUE and LLE d/t gout an

## 2021-04-24 NOTE — PROGRESS NOTES
[facility]  Hospitalist Progress Note     Isidro Speaker Patient Status:  Inpatient    1945  66year old CSN [id number]   Location 333/333-A Attending Al Fletcher MD   Hosp Day # 1 PCP Franki Mathews.  MD Blanca     Assessment & Plan: ----------------------------------  Patient complains of ongoing pain in his left wrist.  He also has pain in his left ankle. Possibly slightly better than yesterday. Swelling is significantly improved. Breathing is better. No chest pain.       Object Intravenous Q12H   • amLODIPine Besylate  5 mg Oral Daily   • ferrous sulfate  325 mg Oral Daily with breakfast   • finasteride  5 mg Oral Daily   • hydrALAzine HCl  10 mg Oral Q8H [facility]   • Hydrocortisone  1 Application Rectal BID   • isosorbide dinitrate

## 2021-04-25 NOTE — PROGRESS NOTES
[facility]    Progress Note    Fran Clark Patient Status:  Inpatient    1945 MRN [id number]   Location [facility] 3W/SW Attending Leopoldo Bruns, MD   Hosp Day # 2 PCP Alex Rios MD       Subjective:   Fran Clark * 134* 138   K 4.0 4.1 3.3*   CL 97* 101 104   CO2 24.0 26.0 27.0          XR CHEST AP PORTABLE  (CPT=71045)    Result Date: 4/23/2021  CONCLUSION:  1. Mild-to-moderate cardiomegaly and normal pulmonary vascularity.   Questionable opacity at the lef

## 2021-04-25 NOTE — PROGRESS NOTES
[facility] HOSP - [facility]      Cardiology - Progress Note    Malia Rivera Patient Status:  Inpatient    1945 MRN [id number]   Location [facility] 3W/SW Attending Minesh Veronica MD   Hosp Day # 2 PCP Sunshine Keyes.  MD Blanca       Assessment: 31* 35*   GFRNAA 26* 27* 30*   CA 9.0 8.7 8.3*   * 134* 138   K 4.0 4.1 3.3*   CL 97* 101 104   CO2 24.0 26.0 27.0     Neuro:awake/alert  HEENT: no JVD  Cardiac: irregularly irregular soft systolic murmur  Lungs: clear equal no wheeze  Abdomen: soft

## 2021-04-25 NOTE — PROGRESS NOTES
[facility]  Hospitalist Progress Note     Austin Dahl Patient Status:  Inpatient    1945  66year old CSN [id number]   Location 333/333-A Attending Phil Prieto MD   Hosp Day # 2 PCP Fritz Curling.  MD Blanca     Assessment & Plan: stroke  Quevedo's esophagus  Diverticulosis    dvt prophylaxis: scd  code status: full code  dispo: home upon medical clearance, historically the patient has been aggressively adamant about refusing rehab on multiple occasions. This has not changed. results for input(s): PT, INR, PTT in the last 168 hours.     • Potassium Chloride ER  20 mEq Oral Once   • morphINE sulfate  2 mg Intravenous Once   • bumetanide  2 mg Intravenous BID (Diuretic)   • MethylPREDNISolone Sodium Succ  125 mg Intravenous Once

## 2021-04-25 NOTE — PLAN OF CARE
Problem: CARDIOVASCULAR - ADULT  Goal: Maintains optimal cardiac output and hemodynamic stability  Description: INTERVENTIONS:  - Monitor vital signs, rhythm, and trends  - Monitor for bleeding, hypotension and signs of decreased cardiac output  - Evaluate during the night. Plans for PT/OT evaluation. IV solumedrol continued. Call light within reach. Instructed to call for assistance. Will continue to monitor pt.

## 2021-04-25 NOTE — OCCUPATIONAL THERAPY NOTE
Orders received, chart reviewed for occupational therapy evaluation. RN reported that pt recently given medication for pain management, so it would be a good time to attempt. Upon arrival, pt was supine in bed, reporting he was feeling better.  Pt appearin

## 2021-04-26 NOTE — PROGRESS NOTES
[facility]  Hospitalist Progress Note     Brant Lemon Patient Status:  Inpatient    1945  66year old [facility] [id number]   Location 333/333-A Attending Nicole Almazan MD   Hosp Day # 3 PCP Víctor Rashid.  MD Blanca     Assessment & Plan: kidney disease  History of stroke  Quevedo's esophagus  Diverticulosis    dvt prophylaxis: scd  code status: full code  dispo: home upon medical clearance, historically the patient has been aggressively adamant about refusing rehab on multiple occasions. 4.1 3.3* 3.8   CL 97*   < > 101 104 102   CO2 24.0   < > 26.0 27.0 28.0   ALKPHO 74  --   --   --   --    AST 7*  --   --   --   --    ALT 25  --   --   --   --    BILT 0.8  --   --   --   --    TP 5.8*  --   --   --   --     < > = values in this interval

## 2021-04-26 NOTE — PLAN OF CARE
Problem: Patient Centered Care  Goal: Patient preferences are identified and integrated in the patient's plan of care  Description: Interventions:  - What would you like us to know as we care for you?  I live alone  - Provide timely, complete, and accurat control medications as ordered  - Initiate emergency measures for life threatening arrhythmias  - Monitor electrolytes and administer replacement therapy as ordered  Outcome: Progressing     Problem: SKIN/TISSUE INTEGRITY - ADULT  Goal: Incision(s), wounds

## 2021-04-26 NOTE — CM/SW NOTE
BPCI Bundled Advanced Medicare Program    Plan of care reviewed for care coordination and discharge planning. Noted pt actively enrolled under  BPCI/Medicare program, with DRG Congestive Heart Failure (291, W). Pt enrolled 3/12/2021.   Day 32/90    [facility] CAR

## 2021-04-26 NOTE — PLAN OF CARE
Vitals stable. Potassium replaced per order. Pain levels still elevated but pt is tolerating 6-7/10. Pt feeling constipated, Miralax given. IV Bumex continued.    Problem: Patient Centered Care  Goal: Patient preferences are identified and integrated in the INTERVENTIONS:  - Continuous cardiac monitoring, monitor vital signs, obtain 12 lead EKG if indicated  - Evaluate effectiveness of antiarrhythmic and heart rate control medications as ordered  - Initiate emergency measures for life threatening arrhythmias

## 2021-04-26 NOTE — OCCUPATIONAL THERAPY NOTE
OT orders received, chart reviewed. Pt has been adamantly declining participating in therapy evaluations, today was 3rd attempt.  Pt was educated on benefits of participation, however continued to decline evaluation, he is in too much pain with his gout and

## 2021-04-26 NOTE — PROGRESS NOTES
[facility]    Progress Note    Gaelalayna La Patient Status:  Inpatient    1945 MRN [id number]   Location [facility] 3W/SW Attending Dale Dolan MD   [address] Day # 3 PCP Phyllis Betancourt.  MD Blanca     CARDIOLOGY ATTENDING    Not 04/23/2021    TP 5.8 (L) 04/23/2021    AST 7 (L) 04/23/2021    ALT 25 04/23/2021    PTT 30.8 02/01/2018    INR 1.1 02/01/2018    PT 14.2 07/23/2012    TSH 1.890 04/02/2021    PSA 1.0 01/17/2017    DDIMER >4.00 (H) 03/02/2018    ESRML 60 (H) 05/27/2019    C

## 2021-04-26 NOTE — CM/SW NOTE
Received MDO for [facility] RN - pt is current w/ HH and sister Catarina Both would like to be involved in [address] [place] [address]. Pt is a READMISSION from home alone. Pt was recently [address] [place] [address] w/ [facility] services. DEE orders entered.     PT/OT evaluations are pending at this luan

## 2021-04-26 NOTE — PROGRESS NOTES
[facility]    Progress Note    Jaskaran Cabrera Patient Status:  Inpatient    1945 MRN [id number]   Location [facility] 3W/SW Attending Florecita Arteaga MD   [facility] Day # 3 PCP Gloria Rios MD       Subjective:   Jaskaran Cabrera Recent Labs   Lab 04/24/21  0519 04/25/21  0441 04/26/21  0455   * 178* 174*   BUN 99* 97* 109*   CREATSERUM 2.26* 2.07* 1.89*   GFRAA 31* 35* 39*   GFRNAA 27* 30* 33*   CA 8.7 8.3* 8.3*   * 138 137   K 4.1 3.3* 3.8    104 102

## 2021-04-26 NOTE — PHYSICAL THERAPY NOTE
Orders received for PT eval. Consulted w/ RN Zeferino Caceres who said pt will likely refuse participating w/ therapy as she just asked pt if she could pre-medicate him in preparation for therapy and he said he would refuse to work w/ therapy.     Visited pt in room w

## 2021-04-26 NOTE — PLAN OF CARE
Problem: Patient Centered Care  Goal: Patient preferences are identified and integrated in the patient's plan of care  Description: Interventions:  - What would you like us to know as we care for you?  I live alone  - Provide timely, complete, and accurat control medications as ordered  - Initiate emergency measures for life threatening arrhythmias  - Monitor electrolytes and administer replacement therapy as ordered  Outcome: Progressing     Problem: SKIN/TISSUE INTEGRITY - ADULT  Goal: Skin integrity maría

## 2021-04-26 NOTE — PROGRESS NOTES
04/26/21 1030   Pressure Ulcer 04/23/21 Buttocks Right Stage II scattered open areas- pt. in recliner at home   Date First Assessed/Time First Assessed: 04/23/21 1600   Location: Buttocks  Orientation: Right  Staging: Stage II  Wound Description (Comm Dressing Foam   Dressing Changed New   Dressing Status Clean;Dry; Intact   Pressure Ulcer 04/26/21 Heel Outer;Right Deep tissue injury purple painful intact   Date First Assessed/Time First Assessed: 04/26/21 1050   Location: Heel  Orientation: Riaz Booker ml/min\"   • High blood pressure    • High cholesterol    • History of blood transfusion 03/2016   • History of thoracentesis 08/07/2017       • Hyposmolality and/or hyponatremia 8/14/2012   • Iron deficiency anemia 7/16/2013   • Ischemic heart disease recommendations for nutrition to optimize wound healing- Following  Turn schedules  Specialty bed: Lefty mattress for prevention and comfort  Heels elevated using pillows, heel wedge or heel boots to offload heels  Use of lift equipment  To prevent sliding: surface. Bilateral lateral heels with suspected deep tissue injuries, these were cleansed, traced and measured. All dressings were changed.  The pt's nurse is at the bedside to help, she saw the heels and left posterior leg, Right buttock cheek with 5 stage

## 2021-04-27 NOTE — PLAN OF CARE
AO3, turn q2 or as needed, EF of 40%, IVP bumex to transition to oral in am, PT/OT to see in am, wound dressings changes, colchicine for gout issues, denies pain or sob at this time.       Problem: CARDIOVASCULAR - ADULT  Goal: Maintains optimal cardiac out

## 2021-04-27 NOTE — PROGRESS NOTES
[facility]    Progress Note    Equilla Sor Patient Status:  Inpatient    1945 MRN [id number]   Location [facility] 3W/SW Attending Chris Murray MD   [facility] Day # 4 PCP Shelley Rios MD       Subjective:   Equilla Sor 35*   CA 8.3* 8.3* 8.4*    137 140   K 3.3* 3.8 3.6    102 103   CO2 27.0 28.0 30.0          No results found.         Edwin Langston MD  4/27/2021

## 2021-04-27 NOTE — CONSULTS
[facility]    Report of Consultation    Robert Bains Patient Status:  Inpatient    1945 MRN [id number]   Location [facility] 3W/SW Attending Santo Cardenas MD   [facility] Day # 4 PCP Davis Francisco.  MD Blanca     Date of Admission: obstruction RT ICA; 50-60% stenosis L\"   • Chronic ischemic heart disease 9/26/2012   • Colon polyp    • Congestive heart failure (CHF) ([facility] [address]    • Diverticulosis    • Fracture of right hip ([facility] [address] 1999   • Hematologic disorder 09/2005    per [employer]:  \"CrC status: Former Smoker        Packs/day: 1.00        Years: 45.00        Pack years: 39        Types: Cigarettes        Start date: 10/3/1958        Quit date: 1993        Years since quittin.3      Smokeless tobacco: Never Used    Vaping Use mg, 10 mg, Oral, Q8H JULES  Hydrocortisone (ANUSOL-HC) 2.5 % rectal cream 1 Application, 1 Application, Rectal, BID  isosorbide dinitrate (ISORDIL) tab 10 mg, 10 mg, Oral, BID (Nitrates)  Pantoprazole Sodium (PROTONIX) EC tab 40 mg, 40 mg, Oral, QAM AC  ator mg total) by mouth daily. febuxostat (ULORIC) 40 MG Oral Tab, Take 0.5 tablets (20 mg total) by mouth daily.  For one month        Allergies    Allopurinol             DIZZINESS    Comment:Dizzy and fatigue    Review of Systems:   Constitutional: Denies fe palpation. NEURO: Cranial nerves II-XII intact grossly. 5/5 strength throughout in both upper and lower extremities, sensation intact.   PSYCH: normal mood      Results:     Laboratory Data:  Lab Results   Component Value Date    WBC 4.8 04/27/2021    HGB Nephrology following    Thank you for allowing me to participate in the care of your patient.  Will continue to follow    Kumar Mayes  4/27/2021

## 2021-04-27 NOTE — PROGRESS NOTES
[facility]  Hospitalist Progress Note     Marianlucero Faustins Patient Status:  Inpatient    1945  66year old CSN [id number]   Location 333/333-A Attending Amada Duckworth MD   Hosp Day # 4 PCP Elaine Todd.  MD Blanca     Assessment & Plan: fibrillation  Hypertension  Dyslipidemia  Prostate  Anemia of chronic kidney disease  History of stroke  Quevedo's esophagus  Diverticulosis    dvt prophylaxis: scd  code status: full code  dispo: pending    D/w patient sister Familia Hunt over the phone.    Sis 2. 0* 2.1*   *   < > 138 137 140   K 4.0   < > 3.3* 3.8 3.6   CL 97*   < > 104 102 103   CO2 24.0   < > 27.0 28.0 30.0   ALKPHO 74  --   --   --   --    AST 7*  --   --   --   --    ALT 25  --   --   --   --    BILT 0.8  --   --   --   --    TP 5.8*

## 2021-04-27 NOTE — PROGRESS NOTES
[place] [facility]     Progress Note    Bronson Singh Patient Status:  Inpatient    1945 MRN [id number]   Location [facility] 3W/SW Attending Luis Fernando Munoz MD   [facility] Day # 4 PCP Josue Rios MD     Assessment:    1.   Right-giorgi bruising    Labs:  Lab Results   Component Value Date    WBC 4.8 04/27/2021    HGB 9.6 04/27/2021    HCT 30.5 04/27/2021    .0 04/27/2021     Lab Results   Component Value Date    PT 14.2 07/23/2012    INR 1.1 02/01/2018     Lab Results   Component

## 2021-04-28 NOTE — PROGRESS NOTES
[facility]    Progress Note    aSnta Green Patient Status:  Inpatient    1945 MRN [id number]   Location [facility] 3W/SW Attending Kalina Murphy MD   Hosp Day # 5 PCP Bob Ohara.  MD Blanca     HPI/Subjective:   Presley Gee TROP 0.115 (HH) 03/14/2021    CK 45 09/25/2019    B12 1,058 (H) 03/02/2018       XR WRIST COMPLETE (MIN 3 VIEWS), LEFT (CPT=73110)    Result Date: 4/27/2021  CONCLUSION:   1. Diffuse bone demineralization.   2. No significant degenerative change or joint

## 2021-04-28 NOTE — CHRONIC PAIN
[facility] - [facility]  Report of Consultation    Zhenmayur Matias Patient Status:  Inpatient    1945 MRN [id number]   Location [facility] 3W/SW Attending Wayne Velazco MD   Hosp Day # 5 PCP Alli Rios MD     Date of Admissio Hyposmolality and/or hyponatremia 8/14/2012   • Iron deficiency anemia 7/16/2013   • Ischemic heart disease     per [employer]:  \"Ischemic heart disease\";  \"Management:  PTCA (1994)\"   • Kidney disease 07/05/2013   • Kidney disorder    • Lipid screening 05 methylPREDNISolone Sodium Succ (Solu-MEDROL) injection 40 mg, 40 mg, Intravenous, Q8H  •  silver sulfADIAZINE (SILVADENE) 1 % cream, , Topical, BID  •  balsam peru-castor oil (VENELEX) ointment, , Topical, BID  •  HYDROcodone-acetaminophen (NORCO) 7.5-325 Oral, Daily  •  tamsulosin HCl (FLOMAX) cap 0.4 mg, 0.4 mg, Oral, Daily  •  folic acid (FOLVITE) tab 400 mcg, 400 mcg, Oral, Daily  •  Vitamin B-12 (VITAMIN B12) tab 1,000 mcg, 1,000 mcg, Oral, Daily    Review of Systems:  Pertinent items are noted in HPI.  No acute fracture or dislocation seen. SOFT TISSUES: No focal soft tissue swelling is appreciated.  Vascular calcifications noted at the radial aspect of the wrist and throughout the central distal forearm. EFFUSION: None visible. OTHER: Negative. Recommendations:  Continue PRN Norco   Continue PRN morphine  Continue steroids and colchecine per rheumatology   Possible cortisone injection per Rheumatology     Thank you for allowing me to participate in the care of your patient.         Comprehen

## 2021-04-28 NOTE — PLAN OF CARE
Problem: Patient Centered Care  Goal: Patient preferences are identified and integrated in the patient's plan of care  Description: Interventions:  - What would you like us to know as we care for you?  I live alone  - Provide timely, complete, and accurat control medications as ordered  - Initiate emergency measures for life threatening arrhythmias  - Monitor electrolytes and administer replacement therapy as ordered  Outcome: Progressing     Problem: SKIN/TISSUE INTEGRITY - ADULT  Goal: Skin integrity maría Progressing     Problem: Impaired Activities of Daily Living  Goal: Achieve highest/safest level of independence in self care  Description: Interventions:  - Assess ability and encourage patient to participate in ADLs to maximize function  - Promote sittin interpreters to assist at discharge as needed  - Consider post-discharge preferences of patient/family/discharge partner  - Complete POLST form as appropriate  - Assess patient's ability to be responsible for managing their own health  - Refer to [facility]

## 2021-04-28 NOTE — PROGRESS NOTES
[facility]    [facility] Note    Jorge Burn Patient Status:  Inpatient    1945 MRN [id number]   Location [facility] 3W/SW Attending Flora Cowan MD   Hosp Day # 5 PCP Natalie Styles.  MD Blanca       Subjective:   Herbert Carlisle --  1.84*  --  1.68*   GFRAA 39*  --  40*  --  45*   GFRNAA 33*  --  35*  --  39*   CA 8.3*  --  8.4*  --  8.5     --  140  --  138   K 3.8   < > 3.6 4.3 4.4     --  103  --  104   CO2 28.0  --  30.0  --  30.0    < > = values in this interval

## 2021-04-28 NOTE — PLAN OF CARE
AO3, turn q2 or as needed, EF of 40, oral bumex in am, pain management, xray of left wrist was negative, PT/OT to see, IV vanco discontinued, Wound dressing changes done. Pt on colchicine for gout flare ups. Denies sob at this time.       Problem: CARDIOV Implement wound care per orders  - Initiate isolation precautions as appropriate  - Initiate Pressure Ulcer prevention bundle as indicated  Outcome: Progressing

## 2021-04-28 NOTE — PROGRESS NOTES
[facility]    Progress Note    Oriana Tatum Patient Status:  Inpatient    1945 MRN [id number]   Location [facility] 3W/SW Attending Jayy Storey MD   Hosp Day # 5 PCP Mary Rios MD       Subjective:   Dell Escobar bumetanide (BUMEX) 0.25 MG/ML injection 2 mg, 2 mg, Intravenous, BID (Diuretic)  •  methylPREDNISolone Sodium Succ (Solu-MEDROL) injection 125 mg, 125 mg, Intravenous, Once  •  ondansetron HCl (ZOFRAN) injection 4 mg, 4 mg, Intravenous, Q6H PRN  •  Metoclo also has significant issues with fluid management despite his relatively stable creatinine. Given his issues with gout and diuresis, there is some consideration for hemodialysis.   -Ongoing discussions regarding renal replacement therapy  -Nephrology aleshia *  --  98*  --  98*   CREATSERUM 1.89*  --  1.84*  --  1.68*   GFRAA 39*  --  40*  --  45*   GFRNAA 33*  --  35*  --  39*   CA 8.3*  --  8.4*  --  8.5     --  140  --  138   K 3.8   < > 3.6 4.3 4.4     --  103  --  104   CO2 28.0  --

## 2021-04-28 NOTE — PLAN OF CARE
Patient alert and oriented, on room air, reports gout pain to his left wrist and left foot and ankle, patient nearly crying asking for colchicine this morning. Colchicine re-ordered, norco given prn, IV steroids started. Xray of R wrist done.  IV bumex BID Progressing  Goal: Absence of cardiac arrhythmias or at baseline  Description: INTERVENTIONS:  - Continuous cardiac monitoring, monitor vital signs, obtain 12 lead EKG if indicated  - Evaluate effectiveness of antiarrhythmic and heart rate control medicati partner  - Complete POLST form as appropriate  - Assess patient's ability to be responsible for managing their own health  - Refer to Case Management Department for coordinating discharge planning if the patient needs post-hospital services based on physic w/ or w/o assistive devices  - Assist with transfers and ambulation using safe patient handling equipment as needed  - Ensure adequate protection for wounds/incisions during mobilization  - Obtain PT/OT consults as needed  - Advance activity as appropriate

## 2021-04-28 NOTE — PROGRESS NOTES
[facility]    Progress Note    Oriana Tatum Patient Status:  Inpatient    1945 MRN [id number]   Location [facility] 3W/SW Attending Jayy Storey MD   Hosp Day # 5 PCP Mary Rios MD     CARDIOLOGY ATTENDING (H) 05/27/2019    CRP 5.64 (H) 05/27/2019    MG 2.1 04/24/2021    PHOS 3.3 04/28/2021    TROP 0.115 (HH) 03/14/2021    CK 45 09/25/2019    B12 1,058 (H) 03/02/2018       XR WRIST COMPLETE (MIN 3 VIEWS), LEFT (CPT=73110)    Result Date: 4/27/2021  CONCLUSIO

## 2021-04-29 NOTE — PROGRESS NOTES
[facility]    Progress Note    Brant Lemon Patient Status:  Inpatient    1945 MRN [id number]   Location [facility] 3W/SW Attending Chino Gonzalez MD   [address] Day # 6 PCP Víctor Rashid.  MD Blanca       Subjective:   Alejandro Sosa packet 17 g, 17 g, Oral, Daily PRN  •  methylPREDNISolone Sodium Succ (Solu-MEDROL) injection 125 mg, 125 mg, Intravenous, Once  •  ondansetron HCl (ZOFRAN) injection 4 mg, 4 mg, Intravenous, Q6H PRN  •  Metoclopramide HCl (REGLAN) injection 5 mg, 5 mg, In management despite his relatively stable creatinine. Given his issues with gout and diuresis, there is some consideration for hemodialysis. -Ongoing discussions regarding renal replacement therapy  -Nephrology signed off  -BMP     Possible GI bleed.   The 04/29/21  0454   *  --   --  243* 256*   BUN 98*  --   --  98* 112*   CREATSERUM 1.84*  --   --  1.68* 1.69*   GFRAA 40*  --   --  45* 44*   GFRNAA 35*  --   --  39* 38*   CA 8.4*  --   --  8.5 9.0     --   --  138 139   K 3.6   < > 4.3 4.4 4.

## 2021-04-29 NOTE — PROGRESS NOTES
[facility]    Progress Note    Equilla Sor Patient Status:  Inpatient    1945 MRN [id number]   Location [facility] 3W/SW Attending Wali Whipple MD   [facility] Day # 6 PCP Shelley Rios MD     HPI/Subjective:   Karely Elliott PHOS 2.8 04/29/2021    TROP 0.115 (HH) 03/14/2021    CK 45 09/25/2019    B12 1,058 (H) 03/02/2018       No results found.         Assessment & Plan:   Gouty arthritis- L wrist and L ankle pain improved  - He has had gout for many years but it does not seem

## 2021-04-29 NOTE — PLAN OF CARE
Problem: Patient Centered Care  Goal: Patient preferences are identified and integrated in the patient's plan of care  Description: Interventions:  - What would you like us to know as we care for you?  I live alone  - Provide timely, complete, and accurat control medications as ordered  - Initiate emergency measures for life threatening arrhythmias  - Monitor electrolytes and administer replacement therapy as ordered  Outcome: Progressing     Problem: SKIN/TISSUE INTEGRITY - ADULT  Goal: Skin integrity maría Activities of Daily Living  Goal: Achieve highest/safest level of independence in self care  Description: Interventions:  - Assess ability and encourage patient to participate in ADLs to maximize function  - Promote sitting position while performing ADLs s patient/family/discharge partner  - Complete POLST form as appropriate  - Assess patient's ability to be responsible for managing their own health  - Refer to Case Management Department for coordinating discharge planning if the patient needs post-hospital

## 2021-04-29 NOTE — PROGRESS NOTES
[facility]    [facility]    Iliana Lopes Patient Status:  Inpatient    1945 MRN [id number]   Location [facility] 3W/SW Attending Hellen Kennedy MD   [facility] Day # 6 PCP Bessy Callahan.  MD Blanca     CARDIOLOGY ATTENDING ALT 25 04/23/2021    PTT 30.8 02/01/2018    INR 1.1 02/01/2018    PT 14.2 07/23/2012    TSH 1.890 04/02/2021    PSA 1.0 01/17/2017    DDIMER >4.00 (H) 03/02/2018    ESRML 60 (H) 05/27/2019    CRP 5.64 (H) 05/27/2019    MG 2.1 04/24/2021    PHOS 2.8 04/29/2

## 2021-04-29 NOTE — PLAN OF CARE
Problem: Patient Centered Care  Goal: Patient preferences are identified and integrated in the patient's plan of care  Description: Interventions:  - What would you like us to know as we care for you?  I live alone  - Provide timely, complete, and accurat control medications as ordered  - Initiate emergency measures for life threatening arrhythmias  - Monitor electrolytes and administer replacement therapy as ordered  Outcome: Progressing     Problem: SKIN/TISSUE INTEGRITY - ADULT  Goal: Skin integrity maraí transfers  Outcome: Progressing     Problem: Impaired Activities of Daily Living  Goal: Achieve highest/safest level of independence in self care  Description: Interventions:  - Assess ability and encourage patient to participate in ADLs to maximize functi etc)  - Arrange for interpreters to assist at discharge as needed  - Consider post-discharge preferences of patient/family/discharge partner  - Complete POLST form as appropriate  - Assess patient's ability to be responsible for managing their own health

## 2021-04-29 NOTE — DIETARY NOTE
ADULT NUTRITION REASSESSMENT    Pt is at moderate nutrition risk. Pt does not meet malnutrition criteria.       RECOMMENDATIONS TO MD:  See Nutrition Intervention    ADMITTING DIAGNOSIS:   Gouty arthritis,  Left leg cellulitis,  Acute on chronic congestive Obtained    MEDICATIONS: reviewed  • bumetanide  1 mg Oral BID (Diuretic)   • carvedilol  3.125 mg Oral BID with meals   • MethylPREDNISolone Sodium Succ  40 mg Intravenous Q8H   • silver sulfADIAZINE   Topical BID   • balsam peru-castor oil   Topical BID 87.4 kg (192 lb 9.6 oz)   04/28/21 0500 88 kg (194 lb 1.6 oz)   04/27/21 0445 89.4 kg (197 lb)   04/26/21 0641 89.2 kg (196 lb 9.6 oz)   04/25/21 0633 87 kg (191 lb 14.4 oz)   04/24/21 0449 89.1 kg (196 lb 8 oz)   04/23/21 1210 88.9 kg (196 lb)     LU

## 2021-04-29 NOTE — CHRONIC PAIN
[facility] - [facility]  Inpatient Pain Management Progress Note      Patient name: Isidro Stevens 66year old male  : 1945  MRN: [id number]    Diagnosis: Acute on chronic congestive heart failure, unspecified heart failure type ([facility] [address]  (primary \"Management:  PTCA (1994)\"   • COLONOSCOPY  09-,01/2015   • EGD N/A 1/13/2017    Procedure: ESOPHAGOGASTRODUODENOSCOPY (EGD);   Surgeon: Jennifer Roldan MD;  Location: [address] [place] [address] ENDOSCOPY   • ELECTROCARDIOGRAM, COMPLETE  09-    Scanned to me mcg Oral Daily     Continuous Infusions:  PRN Meds:. HYDROcodone-acetaminophen, HYDROcodone-acetaminophen, morphINE sulfate, PEG 3350, ondansetron HCl, Metoclopramide HCl, acetaminophen **OR** [DISCONTINUED] HYDROcodone-acetaminophen **OR** [DISCONTINUED] H

## 2021-04-30 NOTE — CM/SW NOTE
SW received a call from BRUCE Lainez who stated the pt. Is agreeable to working with PT and agreeable to rehab. Referral for rehab has been sent in [address] [place] [address]. PT/OT notes will need to be sent when they are available.   Waiting on accepting SARs so options ca

## 2021-04-30 NOTE — PROGRESS NOTES
[facility]    Progress Note    Morena Hamilton Patient Status:  Inpatient    1945 MRN [id number]   Location [facility] 3W/SW Attending Lu Ponce MD   [facility] Day # 7 PCP Sukumar Cuevas.  MD Blanca     HPI/Subjective:   Bekah Thomas 04/29/2021    TROP 0.115 () 03/14/2021    CK 45 09/25/2019    B12 1,058 (H) 03/02/2018       No results found.         Assessment & Plan:   Gouty arthritis- L wrist and L ankle pain improved, still has pain in his L MCPs  - He has had gout for many years

## 2021-04-30 NOTE — PLAN OF CARE
Michael Rossi RA, offered q2h turn but declining, PT/OT eval orders placed as patient confirmed agreeable for PT/OT, IV solu-medrol given and started to PO prednisone, wounds cleaned and treate. Pt agreeable to short stay at a rehab.        Problem: Patient Celestina Progressing  Goal: Absence of cardiac arrhythmias or at baseline  Description: INTERVENTIONS:  - Continuous cardiac monitoring, monitor vital signs, obtain 12 lead EKG if indicated  - Evaluate effectiveness of antiarrhythmic and heart rate control medicati

## 2021-04-30 NOTE — PROGRESS NOTES
[place] [facility]    Progress Note    Michael Vu Patient Status:  Inpatient    1945 MRN [id number]   Location [facility] 3W/SW Attending Tommy Carranza MD   [facility] Day # 7 PCP Rajendra Rios MD     HPI/Subjective:     Re Hydral/isordil up titrated - no acei,arb,arni or mra with CR   -Stop norvasc   -consider Cardiomem eval as out pt      CAD  - history of remote PCI  -no asa with recent gib , continue statin      Chronic atrial fibrillation  -  Rate controlled -60-70- no f

## 2021-04-30 NOTE — PLAN OF CARE
Problem: CARDIOVASCULAR - ADULT  Goal: Maintains optimal cardiac output and hemodynamic stability  Description: INTERVENTIONS:  - Monitor vital signs, rhythm, and trends  - Monitor for bleeding, hypotension and signs of decreased cardiac output  - Evaluate function  Description: INTERVENTIONS:  - Assess patient stability and activity tolerance for standing, transferring and ambulating w/ or w/o assistive devices  - Assist with transfers and ambulation using safe patient handling equipment as needed  - Ensure post-discharge preferences of patient/family/discharge partner  - Complete POLST form as appropriate  - Assess patient's ability to be responsible for managing their own health  - Refer to Case Management Department for coordinating discharge planning if t

## 2021-04-30 NOTE — PROGRESS NOTES
[facility]    Progress Note    Fran Clark Patient Status:  Inpatient    1945 MRN [id number]   Location [facility] 3W/SW Attending Cindy Mcadams MD   [facility] Day # 7 PCP Alex Rios MD       Subjective:   Norval Presume Oral, Q4H PRN  •  morphINE sulfate (PF) 2 MG/ML injection 2 mg, 2 mg, Intravenous, Q4H PRN  •  docusate sodium (COLACE) cap 100 mg, 100 mg, Oral, BID  •  PEG 3350 (MIRALAX) powder packet 17 g, 17 g, Oral, Daily PRN  •  methylPREDNISolone Sodium Succ (Solu- relatively stable creatinine. Given his issues with gout and diuresis, there is some consideration for hemodialysis. -Ongoing discussions regarding renal replacement therapy  -Nephrology signed off  -BMP     Possible GI bleed.   The patient was recently h 04/28/21  0510 04/29/21  0454   *  --   --  243* 256*   BUN 98*  --   --  98* 112*   CREATSERUM 1.84*  --   --  1.68* 1.69*   GFRAA 40*  --   --  45* 44*   GFRNAA 35*  --   --  39* 38*   CA 8.4*  --   --  8.5 9.0     --   --  138 139   K 3.6

## 2021-05-01 NOTE — PHYSICAL THERAPY NOTE
PHYSICAL THERAPY EVALUATION - INPATIENT     Room Number: 333/333-A  Evaluation Date: 5/1/2021  Type of Evaluation: Initial   Physician Order: PT Eval and Treat    Presenting Problem: acute on CHF  Reason for Therapy: Mobility Dysfunction and Discharge Bre arthritis    Left leg cellulitis      Past Medical History  Past Medical History:   Diagnosis Date   • Anemia     per NextGen:  \"Anemia mild secondary to gastritis\"   • Anemia in chronic kidney disease 10/4/2013   • Arrhythmia     Afib   • Atherosclerosi 10/29/13,09/19/2012       HOME SITUATION  Type of Home: House   Home Layout: One level        Stairs to Bedroom: 6       Lives With: Alone  Drives: No  Patient Owned Equipment: Cane  Patient Regularly Uses: None    Prior Level of Elbert: Prior to adm railing?: Total     AM-PAC Score:  Raw Score: 11   Approx Degree of Impairment: 72.57%   Standardized Score (AM-PAC Scale): 33.86   CMS Modifier (G-Code): CL    FUNCTIONAL ABILITY STATUS  Gait Assessment   Gait Assistance: Not tested           Stoop/Curb A

## 2021-05-01 NOTE — PLAN OF CARE
Patient alert and oriented. On room air, 2 assist. Suprapubic in place. Dressings CDI. PO bumex and IV solumedrol. Call light within reach.   Problem: Patient Centered Care  Goal: Patient preferences are identified and integrated in the patient's plan of ca Continuous cardiac monitoring, monitor vital signs, obtain 12 lead EKG if indicated  - Evaluate effectiveness of antiarrhythmic and heart rate control medications as ordered  - Initiate emergency measures for life threatening arrhythmias  - Monitor electro mobility and gait  - Educate and engage patient/family in tolerated activity level and precautions  Outcome: Progressing     Problem: Impaired Activities of Daily Living  Goal: Achieve highest/safest level of independence in self care  Description: Que Perez wound care, etc)  - Arrange for interpreters to assist at discharge as needed  - Consider post-discharge preferences of patient/family/discharge partner  - Complete POLST form as appropriate  - Assess patient's ability to be responsible for managing their

## 2021-05-01 NOTE — PLAN OF CARE
Problem: Patient Centered Care  Goal: Patient preferences are identified and integrated in the patient's plan of care  Description: Interventions:  - What would you like us to know as we care for you?  I live alone  - Provide timely, complete, and accurat control medications as ordered  - Initiate emergency measures for life threatening arrhythmias  - Monitor electrolytes and administer replacement therapy as ordered  Outcome: Progressing     Problem: SKIN/TISSUE INTEGRITY - ADULT  Goal: Skin integrity maría transfers  Outcome: Progressing     Problem: Impaired Activities of Daily Living  Goal: Achieve highest/safest level of independence in self care  Description: Interventions:  - Assess ability and encourage patient to participate in ADLs to maximize functi etc)  - Arrange for interpreters to assist at discharge as needed  - Consider post-discharge preferences of patient/family/discharge partner  - Complete POLST form as appropriate  - Assess patient's ability to be responsible for managing their own health

## 2021-05-01 NOTE — OCCUPATIONAL THERAPY NOTE
OCCUPATIONAL THERAPY EVALUATION - INPATIENT     Room Number: 333/333-A  Evaluation Date: 5/1/2021  Type of Evaluation: Initial  Presenting Problem: acute on chronic CHF    Physician Order: IP Consult to Occupational Therapy  Reason for Therapy: ADL/IADL Dy above deficits, maximizing patient's ability to return to prior level of function. The patient's Approx Degree of Impairment: 59.67% has been calculated based on documentation in the [facility] '6 clicks' Inpatient Daily Activity Short Form.   Research suppor 07/05/2013   • Kidney disorder    • Lipid screening 05-    per NextGen Order Module   • Neutropenia [facility])    • Occlusion and stenosis of carotid artery 12/2/2013   • Recent change in frequency of bowel movements    • Stroke [facility]) 1993   • UTI (urina Techniques: Activity promotion; Body mechanics; Relaxation;Repositioning    COGNITION  Overall Cognitive Status:  WFL - within functional limits  Arousal/Alertness:  appropriate responses to stimuli    VISION  Current Vision: no visual deficits    PERCEPTION Session: Up in chair;Needs met;Call light within reach;RN aware of session/findings; All patient questions and concerns addressed    OT Goals  Patients self stated goal is: to get back to taking care of self     Patient will complete functional transfer wit

## 2021-05-01 NOTE — PROGRESS NOTES
[place] [facility]    Progress Note    Breonna Villatoro Patient Status:  Inpatient    1945 MRN [id number]   Location [facility] 3W/SW Attending Graeme Warner MD   [facility] Day # 8 PCP Sarita Pak.  MD Blanca     HPI/Subjective:   Nino Jeffery 2.8 04/29/2021    TROP 0.115 (HH) 03/14/2021    CK 45 09/25/2019    B12 1,058 (H) 03/02/2018       XR HAND (MIN 3 VIEWS), LEFT (CPT=73130)    Result Date: 5/1/2021  CONCLUSION: Normal examination.      Dictated by (CST): Brittany Wiley MD on 5/01/2 MD  5/1/2021

## 2021-05-01 NOTE — PROGRESS NOTES
[facility]    Progress Note    Dolly Medina Patient Status:  Inpatient    1945 MRN [id number]   Location [facility] 3W/SW Attending Neri Tong., MD   [address] [facility] Day # 8 PCP Ranjith Osuna.  MD Blanca       Subjective:   Dioni Waters sulfate (PF) 2 MG/ML injection 2 mg, 2 mg, Intravenous, Q4H PRN  •  docusate sodium (COLACE) cap 100 mg, 100 mg, Oral, BID  •  PEG 3350 (MIRALAX) powder packet 17 g, 17 g, Oral, Daily PRN  •  methylPREDNISolone Sodium Succ (Solu-MEDROL) injection 125 mg, 1 creatinine. Given his issues with gout and diuresis, there is some consideration for hemodialysis. -Ongoing discussions regarding renal replacement therapy  -Nephrology signed off  -BMP     Possible GI bleed.   The patient was recently hospitalized possib .0 163.0 183.0         Recent Labs   Lab 04/28/21  0510 04/29/21  0454 04/30/21  1456   * 256* 382*   BUN 98* 112* 107*   CREATSERUM 1.68* 1.69* 1.85*   GFRAA 45* 44* 40*   GFRNAA 39* 38* 34*   CA 8.5 9.0 8.6    139 138   K 4.4 4.3 3.

## 2021-05-02 NOTE — PROGRESS NOTES
[place] [facility] - [facility]    Progress Note    Janay Randolph Patient Status:  Inpatient    1945 MRN [id number]   Location [facility] 3W/SW Attending Hernan Cobos MD   [facility] Day # 9 PCP Ever Rios MD       Subjective:   Sandy Dao 100 mg, Oral, BID  •  PEG 3350 (MIRALAX) powder packet 17 g, 17 g, Oral, Daily PRN  •  methylPREDNISolone Sodium Succ (Solu-MEDROL) injection 125 mg, 125 mg, Intravenous, Once  •  ondansetron HCl (ZOFRAN) injection 4 mg, 4 mg, Intravenous, Q6H PRN  •  [place] discussions regarding renal replacement therapy  -Nephrology signed off  -BMP     Possible GI bleed. The patient was recently hospitalized possible GI bleed. Had upper endoscopy which was unrevealing.   Colonoscopy was recommended but the patient declined GFRAA 45* 44* 40*   GFRNAA 39* 38* 34*   CA 8.5 9.0 8.6    139 138   K 4.4 4.3 3.8    102 100   CO2 30.0 29.0 29.0         Imaging:   XR HAND (MIN 3 VIEWS), LEFT (CPT=73130)    Result Date: 5/1/2021  CONCLUSION: Normal examination.      Dictat

## 2021-05-02 NOTE — PLAN OF CARE
AO3, complete, oral bumex and steroids, PT/OT, pain management, plan is rehab on Monday, wound dressing changes, no signs and symptoms of sob at this time.      Problem: CARDIOVASCULAR - ADULT  Goal: Maintains optimal cardiac output and hemodynamic stabilit

## 2021-05-03 NOTE — PLAN OF CARE
Pt cleared for dc pending rehab choice. Resting in bed, bed is low and locked in place, call light is within reach. Pt instructed to call for assistance. Wound care performed, dressings C/D/I.    Problem: Patient Centered Care  Goal: Patient preferences are venous puncture sites for bleeding and/or hematoma  - Assess quality of pulses, skin color and temperature  - Assess for signs of decreased coronary artery perfusion - ex.  Angina  - Evaluate fluid balance, assess for edema, trend weights  5/3/2021 1159 by mobility to safest level of function  Description: INTERVENTIONS:  - Assess patient stability and activity tolerance for standing, transferring and ambulating w/ or w/o assistive devices  - Assist with transfers and ambulation using safe patient handling e non-pharmacological measures as appropriate and evaluate response  - Consider cultural and social influences on pain and pain management  - Manage/alleviate anxiety  - Utilize distraction and/or relaxation techniques  - Monitor for opioid side effects  - N

## 2021-05-03 NOTE — PROGRESS NOTES
[facility]    Progress Note    Bridgett Mueller Patient Status:  Inpatient    1945 MRN [id number]   Location [facility] 3W/SW Attending Libby Peña MD   Hosp Day # 10 PCP Seymour Rios MD     HPI/Subjective:   Rockney Force 04/29/2021    TROP 0.115 (HH) 03/14/2021    CK 45 09/25/2019    B12 1,058 (H) 03/02/2018       MRI HAND, LEFT (CPT=73218)    Result Date: 5/3/2021  CONCLUSION:  1. No evidence of an inflammatory arthropathy or tenosynovitis, as clinically questioned.  2. Mi of inflammatory arthropathy or tenosynovitis. Just mild arthritis in the carpus joint. CKD stage 4 with volume overload  - Currently being diuresed and kidney function is improving  - Nephrology following    Will sign off.      Rafael Guardado MD  5/3/2021

## 2021-05-03 NOTE — PHYSICAL THERAPY NOTE
PHYSICAL THERAPY TREATMENT NOTE - INPATIENT     Room Number: 333/333-A       Presenting Problem: acute on CHF    Problem List  Principal Problem:    Acute on chronic congestive heart failure, unspecified heart failure type [facility])  Active Problems:    Gouty PAIN ASSESSMENT   Rating: Unable to rate  Location: left hand and wrist  Management Techniques:  Activity promotion;Repositioning    BALANCE Patient is able to ambulate 100 feet with assist device: walker - rolling at assistance level: supervision   Goal #3   Current Status NT   Goal #4 Patient will negotiate 6 stairs/one curb w/ assistive device and supervision   Goal #4   Current Status NT

## 2021-05-03 NOTE — CM/SW NOTE
Patient is now agreeable to Rehabilitation. Referrals sent in  din, shared with patient and his sister Adenike [place] who is making all decisions for patient. PLAN: Sub Acute Rehabilitant Pending  Decision for Sub Acute Rehabilitation.      Pending review o

## 2021-05-03 NOTE — PLAN OF CARE
Vitals stable. Alert and oriented X4. Dressings changed. No complaints of chest pain or shortness of breath.  Call light and belongings within reach    Problem: Patient Centered Care  Goal: Patient preferences are identified and integrated in the patient's INTERVENTIONS:  - Continuous cardiac monitoring, monitor vital signs, obtain 12 lead EKG if indicated  - Evaluate effectiveness of antiarrhythmic and heart rate control medications as ordered  - Initiate emergency measures for life threatening arrhythmias activity/tolerance for mobility and gait  - Educate and engage patient/family in tolerated activity level and precautions  - Recommend use of total lift for transfers  Outcome: Progressing     Problem: Impaired Activities of Daily Living  Goal: Achieve hig patient/family/discharge partner in discharge planning  - Arrange for needed discharge resources and transportation as appropriate  - Identify discharge learning needs (meds, wound care, etc)  - Arrange for interpreters to assist at discharge as needed  -

## 2021-05-03 NOTE — PROGRESS NOTES
[facility]    Progress Note    Nohemy Oviedo Patient Status:  Inpatient    1945 MRN [id number]   Location [facility] 3W/SW Attending Kapil Becerril MD   Hosp Day # 10 PCP Wen Perez.  MD Blanca       Subjective:   Alexander Rodriguez Intravenous, Q4H PRN  •  docusate sodium (COLACE) cap 100 mg, 100 mg, Oral, BID  •  PEG 3350 (MIRALAX) powder packet 17 g, 17 g, Oral, Daily PRN  •  methylPREDNISolone Sodium Succ (Solu-MEDROL) injection 125 mg, 125 mg, Intravenous, Once  •  ondansetron HC diuresis, there is some consideration for hemodialysis. -Ongoing discussions regarding renal replacement therapy  -Nephrology signed off  -BMP     Possible GI bleed. The patient was recently hospitalized possible GI bleed.   Had upper endoscopy which was Labs   Lab 04/28/21  0510 04/29/21  0454 04/30/21  1456   * 256* 382*   BUN 98* 112* 107*   CREATSERUM 1.68* 1.69* 1.85*   GFRAA 45* 44* 40*   GFRNAA 39* 38* 34*   CA 8.5 9.0 8.6    139 138   K 4.4 4.3 3.8    102 100   CO2 30.0 29.0 29.0

## 2021-05-04 NOTE — CM/SW NOTE
05/04/21 1400   Discharge disposition   Expected discharge disposition Skilled Nurs   Name of Yvon [address] Ca   Discharge transportation [facility]   MDO received for discharge, patient has been accepted at [facility]

## 2021-05-04 NOTE — DISCHARGE SUMMARY
[facility]    Discharge Summary    Isidro Stevens Patient Status:  Inpatient    1945 MRN [id number]   Location [facility] 3W/SW Attending Clyde Haywood MD   [facility] Day # 6 PCP Gracie Rios MD     Date of Admission diastolic heart failure, gout attack, and left lower extremity cellulitis. Physical Exam:    05/04/21  1505   BP: 137/75   Pulse:    Resp:    Temp:    Gen: A+Ox3.  No distress. HEENT: NCAT, neck supple, no carotid bruit.   CV: RRR, S1S2, and intact dis recently hospitalized possible GI bleed.  Had upper endoscopy which was unrevealing.  Colonoscopy was recommended but the patient declined this. Alvino Saxena continues to have very high BUN.  No evidence of melena or bright red blood per rectum.  He is hemodynamica mouth 2 (two) times daily. Refills: 0     HYDROcodone-acetaminophen 7.5-325 MG Tabs  Commonly known as: [facility]  Notes to patient: Last dose yesterday 5/4 at 9:02pm      Take 1-2 tablets by mouth every 4 (four) hours as needed.    Quantity: 30 tablet  Refil known as: Krista Gonzales      Take  by mouth.  take 1 tablet (0.4MG)  by oral route  every day   Refills: 0     Pantoprazole Sodium 40 MG Tbec  Commonly known as: PROTONIX      Take 1 tablet (40 mg total) by mouth every morning before breakfast.   Quantity: 90 tab [facility]  Schedule an appointment as soon as possible for a visit in 1 week    [address]       Greater than 35 minutes spent, >50% spent counseling re: treatment plan and workup    Courtney

## 2021-05-04 NOTE — DISCHARGE PLANNING
I called and gave [facility] Patient's physical and history were relayed to nursing staff and included past medical history, admitting diagnosis of acute on chronic heart failure.  Patient will be picked up via Ambul

## 2021-05-04 NOTE — PLAN OF CARE
Pt eating in bed. On room air. Suprapubic cath clean dry and intact. Complains of generalized chronic pain. From home alone. Plan to discharge to SNF. Now on PO diuretics and prednisone. Needs placement.    Problem: CARDIOVASCULAR - ADULT  Goal: Maintains o orders  - Initiate isolation precautions as appropriate  - Initiate Pressure Ulcer prevention bundle as indicated  Outcome: Not Progressing

## 2021-05-04 NOTE — PLAN OF CARE
Pt A&Ox4. Wound dressings changed. Pt had BM overnight. Oliveira care performed. Plan to DC to [facility] of sister's choice.      Problem: CARDIOVASCULAR - ADULT  Goal: Maintains optimal cardiac output and hemodynamic stability  Description: INTERVENTIONS:  - Monito as indicated  Outcome: Progressing

## 2021-05-12 ENCOUNTER — TELEPHONE (OUTPATIENT)
Dept: INTERNAL MEDICINE CLINIC | Facility: CLINIC | Age: 76
End: 2021-05-12

## 2021-05-12 NOTE — TELEPHONE ENCOUNTER
Received a death certificate from [facility] [place]. Patient passed away on 2021. Patient's chart has been marked  and death certificate placed in Dr Mercy Mcadams.

## 2021-05-12 NOTE — TELEPHONE ENCOUNTER
PRINCE to Dr. Mitch Wilson---    To Dr. Gideon Locke on call----    Certificate placed on desk for signature.

## 2021-05-13 NOTE — TELEPHONE ENCOUNTER
Yes will do    Jade Reid: He decided very quickly into his course at [facility] that he wanted to go on hospice, and it did not take long, he passed very peacefully and with dignity.   He was super sick, FYI to [facility]

## 2021-05-13 NOTE — TELEPHONE ENCOUNTER
Davion Paterson called and chose to leave a message on the voicemail checking the status of the below death certificate.      [facility] # [phone]

## 2021-05-13 NOTE — TELEPHONE ENCOUNTER
To Dr. Clarita Tena---    Please sign death certificate in MD absence thank you! Pt was [facility] re[facility] pt.

## 2021-08-13 ENCOUNTER — APPOINTMENT (OUTPATIENT)
Dept: CARDIOLOGY | Age: 76
End: 2021-08-13

## 2022-02-05 NOTE — PHYSICAL THERAPY NOTE
PHYSICAL THERAPY TREATMENT NOTE - INPATIENT     Room Number: 337/337-A       Presenting Problem: acute on chronic CHF    Problem List  Principal Problem:    Acute on chronic congestive heart failure, unspecified heart failure type [facility])  Active Problems: pt is declining [facility]. Recommending w/c, 24/7 supervision (pt was agreeable to hire a caregiver if needed to ensure this), and RW. End of session:  Pt finished session up in chair. Call light in reach, RN notified of pt's functional mobility.  Recommended t OBJECTIVE    PAIN ASSESSMENT   Rating: Unable to rate  Location: L knee  Management Techniques: Relaxation;Repositioning    BALANCE                                                                                                                     Stat demonstrate supine - sit EOB @ level: supervision        Goal #1   Current Status NT today     Goal #2 Patient is able to demonstrate transfers Sit to/from Stand at assistance level: supervision with walker - rolling        Goal #2  Current Status Sit to s DISPLAY PLAN FREE TEXT

## 2022-06-14 NOTE — PHYSICAL THERAPY NOTE
PHYSICAL THERAPY TREATMENT NOTE - INPATIENT     Room Number: 337/337-A       Presenting Problem: acute on chronic CHF    Problem List  Principal Problem:    Acute on chronic congestive heart failure, unspecified heart failure type [facility])  Active Problems: seen BID to promote safe discharge planning. MD aware of orthostatic BP. PPE worn: mask, gloves, goggles. Pt unable to wear droplet mask during session. Pt received sitting. Pt agreeable to therapy on this date.  Pt reporting he \"feels even worse that understand why each day I am here I start to feel worse. \"       PAIN ASSESSMENT   Rating: Unable to rate  Location: Feet/knee  Management Techniques: Activity promotion; Body mechanics;Breathing techniques;Relaxation;Repositioning    BALANCE EOB @ level: supervision       Goal #1   Current Status NT today    Goal #2 Patient is able to demonstrate transfers Sit to/from Stand at assistance level: supervision with walker - rolling       Goal #2  Current Status Sit to stand, RW, SBA    Goal #3 Bill Ramirez [facility]

## 2022-11-21 NOTE — PROGRESS NOTES
Attempted call, left message requesting return call  Pulmonary Progress Note      NAME: JC arlos Aguilar - ROOM: 230/230-A - MRN: [id number] - Age: 74year old - : 1945    Assessment/Plan:  1.  Recurrent pleural effusion s/p decortication for trapped lung and pleurodesis  - chest tube in place  - IS an 3.07*   HGB  9.2*   HCT  29.0*   MCV  94.4   MCH  30.1   MCHC  31.9*   RDW  14.4   WBC  10.9   PLT  272     Recent Labs   Lab  02/01/18   0445  02/02/18   0422   GLU  168*  121*   BUN  43*  48*   CREATSERUM  1.99*  3.05*   GFRAA  40*  24*   GFRNAA  33*  2

## 2023-07-27 NOTE — PLAN OF CARE
Problem: Patient Centered Care  Goal: Patient preferences are identified and integrated in the patient's plan of care  Description: Interventions:  - What would you like us to know as we care for you?  I bleed easily, even just a knick makes me bleed heav obtain 12 lead EKG if indicated  - Evaluate effectiveness of antiarrhythmic and heart rate control medications as ordered  - Initiate emergency measures for life threatening arrhythmias  - Monitor electrolytes and administer replacement therapy as ordered DISPLAY PLAN FREE TEXT

## 2023-09-05 NOTE — PROGRESS NOTES
September 7, 2023      Christiano Oscar  [address] [facility] [address]        Dear ,    We are writing to inform you of your test results.    Blood sugar and cholesterol are mildly elevated.  You are technically at the start of prediabetes.   We recommend a healthy low sugar diet with plenty of fruits and vegetables.  We also recommend 150 minutes of cardiovascular exercise, such as walking per week and 2 days of resistance training per week.     Otherwise the rest of your labs look okay.     We should repeat labs in 6 to 12 months.     Resulted Orders   HIV Antigen Antibody Combo   Result Value Ref Range    HIV Antigen Antibody Combo Nonreactive Nonreactive      Comment:      HIV-1 p24 Ag & HIV-1/HIV-2 Ab Not Detected   Hepatitis C Screen Reflex to HCV RNA Quant and Genotype   Result Value Ref Range    Hepatitis C Antibody Nonreactive Nonreactive    Narrative    Assay performance characteristics have not been established for newborns, infants, and children.   Lipid Profile   Result Value Ref Range    Cholesterol 199 <200 mg/dL    Triglycerides 49 <150 mg/dL    Direct Measure HDL 59 >=40 mg/dL    LDL Cholesterol Calculated 130 (H) <=100 mg/dL    Non HDL Cholesterol 140 (H) <130 mg/dL    Narrative    Cholesterol  Desirable:  <200 mg/dL    Triglycerides  Normal:  Less than 150 mg/dL  Borderline High:  150-199 mg/dL  High:  200-499 mg/dL  Very High:  Greater than or equal to 500 mg/dL    Direct Measure HDL  Female:  Greater than or equal to 50 mg/dL   Male:  Greater than or equal to 40 mg/dL    LDL Cholesterol  Desirable:  <100mg/dL  Above Desirable:  100-129 mg/dL   Borderline High:  130-159 mg/dL   High:  160-189 mg/dL   Very High:  >= 190 mg/dL    Non HDL Cholesterol  Desirable:  130 mg/dL  Above Desirable:  130-159 mg/dL  Borderline High:  160-189 mg/dL  High:  190-219 mg/dL  Very High:  Greater than or equal to 220 mg/dL   Hemoglobin A1c   Result Value Ref Range    Hemoglobin A1C 5.7 (H) 0.0 - 5.6  [facility]    Progress Note    Bronson Singh Patient Status:  Inpatient    1945 MRN [id number]   Location [facility] 3W/SW Attending Maureen Cardona Prachi Day # 10 PCP Josue Rios MD       Subjective:   Sally Ivey %      Comment:      Normal <5.7%   Prediabetes 5.7-6.4%    Diabetes 6.5% or higher     Note: Adopted from ADA consensus guidelines.   Comprehensive metabolic panel   Result Value Ref Range    Sodium 142 136 - 145 mmol/L    Potassium 5.0 3.4 - 5.3 mmol/L    Chloride 104 98 - 107 mmol/L    Carbon Dioxide (CO2) 25 22 - 29 mmol/L    Anion Gap 13 7 - 15 mmol/L    Urea Nitrogen 14.3 6.0 - 20.0 mg/dL    Creatinine 1.00 0.67 - 1.17 mg/dL    Calcium 9.4 8.6 - 10.0 mg/dL    Glucose 107 (H) 70 - 99 mg/dL    Alkaline Phosphatase 98 40 - 129 U/L    AST 23 0 - 45 U/L      Comment:      Reference intervals for this test were updated on 6/12/2023 to more accurately reflect our healthy population. There may be differences in the flagging of prior results with similar values performed with this method. Interpretation of those prior results can be made in the context of the updated reference intervals.    ALT 19 0 - 70 U/L      Comment:      Reference intervals for this test were updated on 6/12/2023 to more accurately reflect our healthy population. There may be differences in the flagging of prior results with similar values performed with this method. Interpretation of those prior results can be made in the context of the updated reference intervals.      Protein Total 6.7 6.4 - 8.3 g/dL    Albumin 4.7 3.5 - 5.2 g/dL    Bilirubin Total 0.2 <=1.2 mg/dL    GFR Estimate >90 >60 mL/min/1.73m2   TSH with free T4 reflex   Result Value Ref Range    TSH 1.89 0.30 - 4.20 uIU/mL   Magnesium   Result Value Ref Range    Magnesium 2.1 1.7 - 2.3 mg/dL   CBC with platelets and differential   Result Value Ref Range    WBC Count 6.4 4.0 - 11.0 10e3/uL    RBC Count 5.65 4.40 - 5.90 10e6/uL    Hemoglobin 14.6 13.3 - 17.7 g/dL    Hematocrit 44.8 40.0 - 53.0 %    MCV 79 78 - 100 fL    MCH 25.8 (L) 26.5 - 33.0 pg    MCHC 32.6 31.5 - 36.5 g/dL    RDW 14.5 10.0 - 15.0 %    Platelet Count 221 150 - 450 10e3/uL    % Neutrophils 59 %    % Lymphocytes 32 %  66* 77*   CREATSERUM 2.81* 3.02* 3.20*   GFRAA 24* 22* 20*   GFRNAA 21* 19* 18*   CA 8.4* 7.7* 8.0*    138 137   K 4.0 4.4 4.0    104 101   CO2 28.0 24.0 27.0          No results found.         Solange Vance MD  3/23/2021    % Monocytes 7 %    % Eosinophils 2 %    % Basophils 0 %    % Immature Granulocytes 0 %    Absolute Neutrophils 3.8 1.6 - 8.3 10e3/uL    Absolute Lymphocytes 2.0 0.8 - 5.3 10e3/uL    Absolute Monocytes 0.5 0.0 - 1.3 10e3/uL    Absolute Eosinophils 0.1 0.0 - 0.7 10e3/uL    Absolute Basophils 0.0 0.0 - 0.2 10e3/uL    Absolute Immature Granulocytes 0.0 <=0.4 10e3/uL       If you have any questions or concerns, please call the clinic at the number listed above.       Sincerely,      Sidney Bedoya MD

## 2024-08-08 NOTE — PROCEDURES
Left ultrasound guided thoracentesis    Ultrasound used to visualized moderate sized left pleural effusion with no evidence of  loculations seen and area of entry marked. Patient prepped and draped in sterile fashion.   10 cc of 1% lidocaine used to [employer] Printed and mailed to patient as requested. Explained office only has paper copies, no disc or photo scan.   She will have to get from the facility if she wants prints.

## 2025-07-25 NOTE — PROGRESS NOTES
Zhen Matais is a 74year old male. HPI:   He is feeling well. Gout right index finger - no recurrence, decreased rom opf the PIP joint. His BP at home is in range of 120/70 or less    His weight is down 12 labs     Breathing is good.        SR: no c 200 MG Oral Tab Take 200 mg by mouth 2 (two) times daily. Disp:  Rfl:    Vitamin B-12 (VITAMIN B12) 1000 MCG Oral Tab Take  by mouth.  take 1 by Oral route in the morning Disp:  Rfl:    Ferrous Sulfate 325 (65 FE) MG Oral Tab Take 325 mg by mouth daily with use: Yes      Alcohol/week: 3.6 oz      Types: 6 Standard drinks or equivalent per week      Comment: drinks occasionally    Drug use: No       REVIEW OF SYSTEMS:   GENERAL HEALTH: feels well otherwise  SKIN: denies any unusual skin lesions or rashes  RESP Adult

## (undated) DEVICE — SPONGE: SPECIALTY PEANUT XR 100/CS: Brand: MEDICAL ACTION INDUSTRIES

## (undated) DEVICE — INSULATED BLADE ELECTRODE 6.5

## (undated) DEVICE — CYSTO PACK: Brand: MEDLINE INDUSTRIES, INC.

## (undated) DEVICE — SUTURE MONOCRYL 3-0 Y936H

## (undated) DEVICE — SOL  .9 3000ML

## (undated) DEVICE — SUTURE PDS II 2-0 CT-1

## (undated) DEVICE — TUBING CYSTO TUR DUAL

## (undated) DEVICE — SUTURE MONOCRYL 4-0 Y845G

## (undated) DEVICE — UROLOGY DRAIN BAG

## (undated) DEVICE — FLEXIPATH FLEXIBLE SURGICAL TROCARS: Brand: FLEXIPATH

## (undated) DEVICE — DERMABOND LIQUID ADHESIVE

## (undated) DEVICE — SOL H2O 1000ML BTL

## (undated) DEVICE — SNARE CAPTI HEX STIFF MEDIUM

## (undated) DEVICE — THORACIC: Brand: MEDLINE INDUSTRIES, INC.

## (undated) DEVICE — ABSORBABLE HEMOSTAT (OXIDIZED REGENERATED CELLULOSE, U.S.P.): Brand: SURGICEL

## (undated) DEVICE — SOL  .9 1000ML BTL

## (undated) DEVICE — SUCTION CANISTER, 3000CC,SAFELINER: Brand: DEROYAL

## (undated) DEVICE — 3M™ TEGADERM™ TRANSPARENT FILM DRESSING, 1626W, 4 IN X 4-3/4 IN (10 CM X 12 CM), 50 EACH/CARTON, 4 CARTON/CASE: Brand: 3M™ TEGADERM™

## (undated) DEVICE — SUTURE ETHILON 2-0 FS

## (undated) DEVICE — CAUTERY: TIP CLEANER XR 100/CS: Brand: MEDICAL ACTION INDUSTRIES

## (undated) DEVICE — CONTAINER SPEC STR 4OZ GRY LID

## (undated) DEVICE — 6 ML SYRINGE LUER-LOCK TIP: Brand: MONOJECT

## (undated) DEVICE — X-RAY DETECTABLE SPONGES,16 PLY: Brand: VISTEC

## (undated) DEVICE — SUTURE VICRYL 0 UR-6

## (undated) DEVICE — LINE MNTR ADLT SET O2 INTMD

## (undated) DEVICE — SUTURE PROLENE 4-0 BB

## (undated) DEVICE — TRAY SKIN PREP PVP-1

## (undated) DEVICE — MEDI-VAC NON-CONDUCTIVE SUCTION TUBING 6MM X 1.8M (6FT.) L: Brand: CARDINAL HEALTH

## (undated) DEVICE — 3 ML SYRINGE LUER-LOCK TIP: Brand: MONOJECT

## (undated) DEVICE — Device: Brand: CUSTOM PROCEDURE KIT

## (undated) DEVICE — Device

## (undated) DEVICE — TOWEL SURG OR 17X30IN BLUE

## (undated) DEVICE — NEEDLE SPINAL 22X5 405148

## (undated) DEVICE — CATH SECURING DEVICE STATLOCK

## (undated) DEVICE — GOWN SURG AERO BLUE PERF LG

## (undated) DEVICE — BAG DRAIN INFECTION CNTRL 2000

## (undated) DEVICE — ONE-STEP SUPRAPUBIC INTRODUCER: Brand: COOK

## (undated) DEVICE — SUTURE PDS II 1 CTX

## (undated) DEVICE — STERILE SURGICAL LUBRICANT, METAL TUBE: Brand: SURGILUBE

## (undated) DEVICE — 3M™ IOBAN™ 2 ANTIMICROBIAL INCISE DRAPE 6650EZ: Brand: IOBAN™ 2

## (undated) DEVICE — CLIP SM INTNL HRZN TI ESCP LGT

## (undated) DEVICE — DRESSING FM 4.25X4.25IN PLMM

## (undated) DEVICE — SUTURE ETHIBOND 0 CT-1

## (undated) DEVICE — 35 ML SYRINGE REGULAR TIP: Brand: MONOJECT

## (undated) DEVICE — ENCORE® LATEX ACCLAIM SIZE 7.5, STERILE LATEX POWDER-FREE SURGICAL GLOVE: Brand: ENCORE

## (undated) DEVICE — CONMED SCOPE SAVER BITE BLOCK, 20X27 MM: Brand: SCOPE SAVER

## (undated) DEVICE — BLADE 11 SHRP BP SS SRG STRL

## (undated) DEVICE — SUTURE VICRYL 2-0 J849G

## (undated) DEVICE — INTENDED USED TO PROTECT, TAG AND HELP LOCATED SUTURES DURING SURGERY: Brand: STERION®SUTURE AID BOOTIES

## (undated) DEVICE — STERILE POLYISOPRENE POWDER-FREE SURGICAL GLOVES: Brand: PROTEXIS

## (undated) DEVICE — Device: Brand: JELCO

## (undated) DEVICE — ENCORE® LATEX ACCLAIM SIZE 6, STERILE LATEX POWDER-FREE SURGICAL GLOVE: Brand: ENCORE

## (undated) DEVICE — FORCEP RADIAL JAW 4

## (undated) NOTE — IP AVS SNAPSHOT
[facility]            (For Outpatient Use Only) Initial Admit Date: 4/23/2021   Inpt/Obs Admit Date: Inpt: 4/23/21 / Obs: N/A   Discharge Date:    Florian Dodd:  [de-identified]   MRN: [de-identified]   CSN: [id number]   CEID: [id number]        KPX Subscriber:    Hospital Account Financial Class: Medicare    May 4, 2021

## (undated) NOTE — LETTER
[address], [place]  Authorization for Invasive Procedures  1.  I hereby authorize Dr. Noemi Murray , my physician and whomever may be designated as the doctor's assistant, to perform the following operation and/or procedure:  esophagoga fever and allergic reactions, hemolytic reactions, transmission of disease such as hepatitis, AIDS, cytomegalovirus (CMV), and flluid overload.  In the event that I wish to have autologous transfusions of my own blood, or a directed donor transfusion, I osiris Signature of Patient:  ________________________________________________ Date: _________Time: _________    Responsible person in case of minor or unconscious: _____________________________Relationship: ____________     Witness Signature: ___________________

## (undated) NOTE — MR AVS SNAPSHOT
TONY Argyle  [address] [place] [address]  [phone]               Thank you for choosing us for your health care visit with Martell Gonzales. MD Blanca.  We are glad to serve you and happy to provide you with this summary of your visit.   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]           TSH [E]    Complete by: May 10, 2017 (Approximate)    Assoc Dx:   Anemia in chronic kidney disease [N18.9, D63.1], Quevedo's esophagus without dysplasia [K22.70], Chronic ischemic heart disease [I25.9], Hyperten Chronic ischemic heart disease    Hypertension, benign    Renal insufficiency    Carotid artery stenosis without cerebral infarction, left          Instructions and Information about Your Health     None      Allergies as of May 10, 2017     No Known Koffi Commonly known as:  VITAMIN B12           XARELTO 15 MG Tabs   Generic drug:  rivaroxaban                Where to Get Your Medications      These medications were sent to [address] [place] [address] [address] [phone], [phone] Don’t forget strength training with weights and resistance Set goals and track your progress   You don’t need to join a gym. Home exercises work great.  Put more priority on exercise in your life                    Visit [facility] online at

## (undated) NOTE — LETTER
[address] [place] [address] Rd, [place], [place]     AUTHORIZATION FOR SURGICAL OPERATION OR PROCEDURE    1.  I hereby authorize Dr. Phoenix Bowles  my Physician(s) and whomever may be designated as the doctor's Assistant, to perform the following ope 5. I consent to the photographing of procedure(s) to be performed for the purposes of advancing medicine, science and/or education, provided my identity is not revealed.  If the procedure has been videotaped, the physician/surgeon will obtain the original v (Witness signature)                                                                                                  (Date)                                (Time)  STATEMENT OF PHYSICIAN My signature below affirms that prior to the time of the procedure;  I

## (undated) NOTE — LETTER
Eduardo Oconnor [address], [place], [place]  [address]  INFORMED CONSENT FOR TRANSFUSION OF BLOOD OR BLOOD PRODUCTS  My physician has informed me of the nature, purpose, benefits and risks of transfusion for blood and blood components that ______________________________________________  (Signature of Patient)                                                            (Responsible party in case of Minor,

## (undated) NOTE — LETTER
[facility], [place]  Authorization for Invasive Procedures  1.  I hereby authorize   , my physician and whomever may be designated as the doctor's assistant, to perform the following operation and/or procedure:  Injection of co and allergic reactions, hemolytic reactions, transmission of disease such as hepatitis, AIDS, cytomegalovirus (CMV), and flluid overload.  In the event that I wish to have autologous transfusions of my own blood, or a directed donor transfusion, I will disc Signature of Patient:  ________________________________________________ Date: _________Time: _________    Responsible person in case of minor or unconscious: _____________________________Relationship: ____________     Witness Signature: ___________________

## (undated) NOTE — LETTER
[address], [place]  Authorization for Invasive Procedures  1. I hereby authorize  Dr Jaclyn Peralta , my physician and whomever may be designated as the doctor's assistant, to perform the following operation and/or procedure:  Inserti performed for the purposes of advancing medicine, science, and/or education, provided my identity is not revealed. If the procedure has been videotaped, the physician/surgeon will obtain the original videotape.  The hospital will not be responsible for stor My signature below affirms that prior to the time of the procedure, I have explained to the patient and/or his legal representative, the risks and benefits involved in the proposed treatment and any reasonable alternative to the proposed treatment.  I have

## (undated) NOTE — LETTER
[address]  [address] [place], [place]      Authorization for Surgical Operation and Procedure     Date:___________                                                                                                         Time:_______ potential risks that can occur: fever and allergic reactions, hemolytic reactions, transmission of diseases such as Hepatitis, AIDS and Cytomegalovirus (CMV) and fluid overload.   In the event that I wish to have an autologous transfusion of my own blood, o will determine when the applicable recovery period ends for purposes of reinstating the DNAR order.   10. Patients having a sterilization procedure: I understand that if the procedure is successful the results will be permanent and it will therefore be impo _______________________________________________________________ _____________________________  Lindsey QureshiDate)                                   (Time)

## (undated) NOTE — MR AVS SNAPSHOT
[address] [facility] [address]  [phone]               Thank you for choosing us for your health care visit with Cora Rios MD.  We are glad to serve you and happy to provide you with this summary of your visit.   Nate What changed:  how much to take   Commonly known as:  DOCQLACE           Ferrous Sulfate 325 (65 FE) MG Tabs   Take 325 mg by mouth daily with breakfast.           folic acid 916 MCG Tabs   Take  by mouth.  take 1 tablet (0.4MG)  by oral route  every day Atrial fibrillation, new onset [facility]) [213665]           Chronic ischemic heart disease [19811]  Hypertension, benign [14359]  Renal insufficiency [435513]  Atrial fibrillation, new onset [facility] [202634]           Chronic ischemic heart disease [19811]  Hype

## (undated) NOTE — ED AVS SNAPSHOT
Oriana Tatum   MRN: [id number]    Department:  [facility] Emergency Department   Date of Visit:  10/31/2017           Disclosure     Insurance plans vary and the physician(s) referred by the ER may not be covered by your plan.  Please contact CARE PHYSICIAN AT ONCE OR RETURN IMMEDIATELY TO THE EMERGENCY DEPARTMENT. If you have been prescribed any medication(s), please fill your prescription right away and begin taking the medication(s) as directed.   If you believe that any of the medications

## (undated) NOTE — LETTER
Hospital Discharge Documentation  Please phone to schedule a hospital follow up appointment.     From: [address] Hospitalist's Office  Phone: [phone]    Patient discharged time/date: 2/5/2018 12:53 PM  Patient discharge disposition:  Home or Self CV: RRR, S1S2, and intact distal pulses. No gallop, rub, murmur. Pulm: Effort and breath sounds normal. No distress, wheezes, rales, rhonchi. Abd: Soft, NTND, BS normal, no mass, no HSM, no rebound/guarding. Neuro: Normal reflexes, CN.  Sensory/motor ex Refills:  3     Isosorbide Mononitrate ER 30 MG Tb24  Commonly known as:  IMDUR      TAKE ONE TABLET BY MOUTH EVERY DAY   Quantity:  90 tablet  Refills:  3     Labetalol HCl 200 MG Tabs  Commonly known as:  NORMODYNE      Take 200 mg by mouth 2 (two) times and they verbalized understanding and agreement. They understand to return to the emergency room for any concerning signs or symptoms.   Greater than 30 minutes spent on discharge.[WW.1]        Electronically signed by Phil Prieto MD on 2/5/2018 12:47 P

## (undated) NOTE — LETTER
[address], [place]  Authorization for Invasive Procedures  1.  I hereby authorize Dr. Vincenzo Owen , my physician and whomever may be designated as the doctor's assistant, to perform the following operation and/or procedure:  Cystosc that can occur: fever and allergic reactions, hemolytic reactions, transmission of disease such as hepatitis, AIDS, cytomegalovirus (CMV), and flluid overload.  In the event that I wish to have autologous transfusions of my own blood, or a directed donor tr physician.      Signature of Patient:  ________________________________________________ Date: _________Time: _________    Responsible person in case of minor or unconscious: _____________________________Relationship: ____________     Witness Signature: ____

## (undated) NOTE — ED AVS SNAPSHOT
Miguel Cottrell   MRN: [id number]    Department:  [facility] Emergency Department   Date of Visit:  5/27/2019           Disclosure     Insurance plans vary and the physician(s) referred by the ER may not be covered by your plan.  Please contact y within the next three months to obtain basic health screening including reassessment of your blood pressure.     IF THERE IS ANY CHANGE OR WORSENING OF YOUR CONDITION, CALL YOUR PRIMARY CARE PHYSICIAN AT ONCE OR RETURN IMMEDIATELY TO THE EMERGENCY DEPARTMEN

## (undated) NOTE — LETTER
12/30/2019    [facility]            Dear Iliana Lopes,      Our records indicate that you are due for an appointment for a Colonoscopy in January 2020, or shortly there after, with Gerber Wheeler

## (undated) NOTE — IP AVS SNAPSHOT
Patient Demographics     Address  [place] [address] Phone  [phone] [facility]  [phone] [facility] E-mail Address  [email or web address]      Emergency Contact(s)     Name Relation Home Work Mobile    August Sister 15 finasteride 5 MG Tabs  Commonly known as: PROSCAR  Next dose due: Tomorrow morning      TAKE ONE TABLET BY MOUTH DAILY   Sheldon Andrews. MD Blanca         folic acid 317 MCG Tabs  Commonly known as: Chancy Back  Next dose due:  Tomorrow morning      Take  by m tamsulosin HCl 0.4 MG Caps  Commonly known as: FLOMAX  Next dose due: Tomorrow morning      Take 1 capsule (0.4 mg total) by mouth daily. Julian Sánchez.  MD Blanca         traMADol HCl 50 MG Tabs  Commonly known as: ULTRAM      Take 1 tablet (50 mg total) by m 1,000 mcg 05/04/21 0930 Given      [id number] atorvastatin (LIPITOR) tab 20 mg 05/04/21 0930 Given      [id number] balsam peru-castor oil (VENELEX) ointment 05/03/21 2100 Given      [id number] balsam peru-castor oil (VENELEX) ointment 05/04/21 0931 Given Microbiology Results (All)     Procedure Component Value Units Date/Time    Rapid SARS-CoV-2 by PCR STAT [[id number]]  (Normal) Collected: 04/23/21 1240    Order Status: Completed Lab Status: Final result Updated: 04/23/21 1314    Specimen: Other from Nares failure, ejection fraction 40%; moderate pulmonary hypertension; moderate mitral regurgitation; chronic atrial fibrillation; hypertension; chronic kidney disease stage III; gout; hyperlipidemia; enlarged prostate; anemia of chronic kidney disease; cerebrov distention. LUNGS:  Clear to auscultation bilaterally. Diminished breathing sounds, both lung bases. HEART:  Irregular rhythm. S1 and S2 auscultated. Rate control. ABDOMEN:  Soft, nondistended. No tenderness.     EXTREMITIES:  Edema +2 to 3, both l Consultation    Bronson Singh Patient Status:  Inpatient    1945 MRN [id number]   Location [facility] 3W/SW Attending Luis Fernando Munoz MD   [facility] Day # 4 PCP Josue Carpio.  Kp Hobbs MD     Date of Admission:  2021  Date of Consult:  2021 Chronic ischemic heart disease 9/26/2012   • Colon polyp    • Congestive heart failure (CHF) ([facility]    • Diverticulosis    • Fracture of right hip ([facility] 1999   • Hematologic disorder 09/2005    per NextGen:  \"CrCl est. 60 ml/min\"   • High blood pressure Years: 45.00        Pack years: 39        Types: Cigarettes        Start date: 10/3/1958        Quit date: 1993        Years since quittin.3      Smokeless tobacco: Never Used    Vaping Use      Vaping Use: Never used    Alcohol use:  Yes (ANUSOL-HC) 2.5 % rectal cream 1 Application, 1 Application, Rectal, BID  isosorbide dinitrate (ISORDIL) tab 10 mg, 10 mg, Oral, BID (Nitrates)  Pantoprazole Sodium (PROTONIX) EC tab 40 mg, 40 mg, Oral, QAM AC  atorvastatin (LIPITOR) tab 20 mg, 20 mg, Oral (ULORIC) 40 MG Oral Tab, Take 0.5 tablets (20 mg total) by mouth daily.  For one month        Allergies    Allopurinol             DIZZINESS    Comment:Dizzy and fatigue    Review of Systems:   Constitutional: Denies fever, chills  Eyes: Denies dry eyes, bl II-XII intact grossly. 5/5 strength throughout in both upper and lower extremities, sensation intact.   PSYCH: normal mood      Results:     Laboratory Data:  Lab Results   Component Value Date    WBC 4.8 04/27/2021    HGB 9.6 (L) 04/27/2021    HCT 30.5 (L) for allowing me to participate in the care of your patient. Will continue to follow    Jarvis Pacheco  4/27/2021[MK.1]    Electronically signed by Julian Nguyen MD on 4/27/2021  1:08 PM   Attribution Sarkar    [address] - Julian Nguyen MD on 4/27/2021 12:53 PM two trials of transfers, however, patient unable to achieve upright standing position. Requested to return to bed and use bedpan. Patient was left in bed at end of session with all needs in reach. Informed RN and PCT that patient needs bedpan.  The patient' another person does the patient currently need. ..   -   Moving to and from a bed to a chair (including a wheelchair)?: A Lot   -   Need to walk in hospital room?: A Lot   -   Climbing 3-5 steps with a railing?: Total     AM-PAC Score:  Raw Score: 10   Appr 333/333-A  Evaluation Date: 5/1/2021  Type of Evaluation: Initial   Physician Order: PT Eval and Treat    Presenting Problem: acute on CHF  Reason for Therapy: Mobility Dysfunction and Discharge Planning    PHYSICAL THERAPY ASSESSMENT     Patient is a 66 y Past Medical History:   Diagnosis Date   • Anemia     per NextGen:  \"Anemia mild secondary to gastritis\"   • Anemia in chronic kidney disease 10/4/2013   • Arrhythmia     Afib   • Atherosclerosis of coronary artery    • Quevedo's esophagus    • BPH (kirt Layout: One level        Stairs to Bedroom: 6       Lives With: Alone  Drives: No  Patient Owned Equipment: Cane  Patient Regularly Uses: None    Prior Level of [place]: Prior to admission, patient required assist with all ADLs and IADLs.  Patient live Impairment: 72.57%   Standardized Score (AM-PAC Scale): 33.86   CMS Modifier (G-Code): CL    FUNCTIONAL ABILITY STATUS  Gait Assessment   Gait Assistance: Not tested           Stoop/Curb Assistance: Not tested       Bed Mobility: NT    Transfers: max x2 08/01/19     INFLUENZA 08/31/18     INFLUENZA 08/21/18     INFLUENZA 09/03/17     INFLUENZA 09/01/17     INFLUENZA 09/01/17     INFLUENZA 09/13/16     INFLUENZA 09/12/16     INFLUENZA 09/05/15     INFLUENZA 09/05/15     INFLUENZA 09/26/14     INFLUENZA 09/

## (undated) NOTE — LETTER
[address], [place]  Authorization for Invasive Procedures  1.  I hereby authorize Dr. Mario Morgan , my physician and whomever may be designated as the doctor's assistant, to perform the following operation and/or procedure:  Left ult performed for the purposes of advancing medicine, science, and/or education, provided my identity is not revealed. If the procedure has been videotaped, the physician/surgeon will obtain the original videotape.  The hospital will not be responsible for stor My signature below affirms that prior to the time of the procedure, I have explained to the patient and/or his legal representative, the risks and benefits involved in the proposed treatment and any reasonable alternative to the proposed treatment.  I have

## (undated) NOTE — LETTER
[facility], [place]  Authorization for Invasive Procedures  1.  I hereby authorize Dr. Sergio Marquez** , my physician and whomever may be designated as the doctor's assistant, to perform the following operation and/or procedure:  **I performed for the purposes of advancing medicine, science, and/or education, provided my identity is not revealed. If the procedure has been videotaped, the physician/surgeon will obtain the original videotape.  The hospital will not be responsible for stor My signature below affirms that prior to the time of the procedure, I have explained to the patient and/or his legal representative, the risks and benefits involved in the proposed treatment and any reasonable alternative to the proposed treatment.  I have

## (undated) NOTE — LETTER
Hospital Discharge Documentation  Patient was discharged to [facility], [address] [place] [address] [facility]  [address],   [phone]    From: [address] Hospitalist's Office  Phone: [phone]    Patient discharged time/date: 5/4/2021  4: dysplasia     Gross hematuria     Pleural effusion     Pericardial effusion     S/P thoracotomy     Acute renal failure superimposed on stage 3 chronic kidney disease ([facility]     Encounter for Medicare annual wellness exam     Dyspnea     Anemia, unspecified roughly unremarkable. Chest x-ray showed mild to moderate cardiomegaly. EKG showed atrial fibrillation, which is chronic, with frequent PVCs.   The patient was started on Bumex, Solu-Medrol, and vancomycin, and he will be admitted to the hospital for [facility] Prostate  Anemia of chronic kidney disease  History of stroke  Quevedo's esophagus  Diverticulosis[RS. 2]    Consultations:[RS.1] Rheum, Nephro, Cards[RS. 2]    Procedures:[RS.1] See above[RS. 2]    Complications:[RS.1] none[RS. 2]    Discharge Condition:[RS.1 · medication strength  · See the new instructions.       Take 2.5 tablets (50 mg total) by mouth daily with breakfast for 3 days, THEN 2 tablets (40 mg total) daily with breakfast for 3 days, THEN 1.5 tablets (30 mg total) daily with breakfast for 3 days, T Zana Quiñonez  [address], [phone]  [facility]    Phone: [phone]   · finasteride 5 MG Tabs     Please  your prescriptions at the location directed by your doctor or nurse    Bring a paper prescription fo

## (undated) NOTE — MR AVS SNAPSHOT
[address]  [phone]               Thank you for choosing us for your health care visit with Mora Kehr.  MD Aaron.  We are glad to serve you and happy to provide you with this summar Take 1 tablet (5 mg total) by mouth 2 (two) times daily. Commonly known as:  ELIQUIS           aspirin 81 MG Tabs   Take 81 mg by mouth daily. AVODART 0.5 MG Caps   Generic drug:  Dutasteride   Take 0.5 mg by mouth daily.            docusate sod

## (undated) NOTE — MR AVS SNAPSHOT
[address]  [phone]               Thank you for choosing us for your health care visit with Katie Parsons.  MD Aaron.  We are glad to serve you and happy to provide you with this summar aspirin 81 MG Tabs   Take 81 mg by mouth daily. AVODART 0.5 MG Caps   Generic drug:  Dutasteride   Take 0.5 mg by mouth daily. docusate sodium 100 MG Caps   Take 1 capsule (100 mg total) by mouth 2 (two) times daily.    Commonly known a

## (undated) NOTE — ED AVS SNAPSHOT
[facility] Emergency [facility]  [place]ie Espinosa [address]  Phone:  [phone]  Fax:  [phone]   MRN: [id number]    Department:  [facility]   Date of Visit:  7/20/2017 visiting [email or web address].    IF THERE IS ANY CHANGE OR WORSENING OF YOUR CONDITION, CALL YOUR PRIMARY CARE PHYSICIAN AT ONCE OR RETURN IMMEDIATELY TO THE EMERGENCY DEPARTMENT.     If you have been prescribed any medication(s), please fill your prescription

## (undated) NOTE — LETTER
[address] [place] [address] Rd, [place], [place]     AUTHORIZATION FOR SURGICAL OPERATION OR PROCEDURE    1.  I hereby authorize Dr. Shashank Deleon, my Physician(s) and whomever may be designated as the doctor's Assistant, to perform the following 5. I consent to the photographing of procedure(s) to be performed for the purposes of advancing medicine, science and/or education, provided my identity is not revealed.  If the procedure has been videotaped, the physician/surgeon will obtain the original v (Witness signature)                                                                                                  (Date)                                (Time)  STATEMENT OF PHYSICIAN My signature below affirms that prior to the time of the procedure;  I

## (undated) NOTE — LETTER
[address], [place], [place]     AUTHORIZATION FOR SURGICAL OPERATION OR PROCEDURE    1.  I hereby authorize Dr. Lex Wei, my Physician(s) and whomever may be designated as the doctor's Assistant, to perform the following op 5. I consent to the photographing of procedure(s) to be performed for the purposes of advancing medicine, science and/or education, provided my identity is not revealed.  If the procedure has been videotaped, the physician/surgeon will obtain the original v (Witness signature)                                                                                                  (Date)                                (Time)  STATEMENT OF PHYSICIAN My signature below affirms that prior to the time of the procedure;  I

## (undated) NOTE — LETTER
Date & Time: 3/2/2018, 5:45 PM  Patient: Sheldon Walker  Attending Provider: Mina Aleman MD      This certifies that I, Sheldon Walker, a patient at an [facility] facility, am leaving the facility voluntarily and against the advice

## (undated) NOTE — ED AVS SNAPSHOT
Jorge Burn   MRN: [id number]    Department:  [facility] Emergency Department   Date of Visit:  3/2/2018           Disclosure     Insurance plans vary and the physician(s) referred by the ER may not be covered by your plan.  Please contact yo within the next three months to obtain basic health screening including reassessment of your blood pressure.     IF THERE IS ANY CHANGE OR WORSENING OF YOUR CONDITION, CALL YOUR PRIMARY CARE PHYSICIAN AT ONCE OR RETURN IMMEDIATELY TO THE EMERGENCY DEPARTMEN

## (undated) NOTE — LETTER
Hospital Discharge Documentation  Please phone to schedule a hospital follow up appointment.     From: [address] Hospitalist's Office  Phone: [phone]    Patient discharged time/date: 3/26/2021  3:36 PM  Patient discharge disposition:  Home or Self Hypertension, benign     Hyperlipidemia     Anemia     Renal insufficiency     Hyperhomocysteinemia (HCC)     Chronic atrial fibrillation (HCC)     Urinary retention     BPH with obstruction/lower urinary tract symptoms     Edema     Quevedo's esophagus wi decreased urine output with the Lasix.     Labs show bun / creat 102/2.3. Bun above his baseline   Hb 8.2 down from 9.2 in Feb      Troponin 0.08  EKG with Afib.    CXR with chf.          Hospital Course:         Acute on chronic diastolic congestive heart bradycardia - possible pacer in the future      dvt ppx scd/teds                        Al Ramos DO  >35 min spent with patient. > 50% time was spent counseling patient, discussing plan of care, discussing labs and imaging findings.  Spoke with cons TAKE ONE TABLET BY MOUTH DAILY   Quantity: 90 tablet  Refills: 3     folic acid 868 MCG Tabs  Commonly known as: FOLVITE      Take  by mouth.  take 1 tablet (0.4MG)  by oral route  every day   Refills: 0     Pantoprazole Sodium 40 MG Tbec  Commonly known a Rohith Cuadra DO on 3/26/2021  4:40 PM   Attribution Sarkar     [address] - Rohith Cuadra DO on 3/26/2021  9:30 AM                Discharge Summary signed by Rohith Cuadra DO at 3/26/2021  9:33 AM  Version 1 of 2    Author: Rohith Cuadra DO Service obstruction/lower urinary tract symptoms     Edema     Quevedo's esophagus without dysplasia     Gross hematuria     Pleural effusion     Pericardial effusion     S/P thoracotomy     Acute renal failure superimposed on stage 3 chronic kidney disease ([facility] [address] Hospital Course:         Acute on chronic diastolic congestive heart failure  - echo in 2015 with normal EF.  Grade 3 DD ; mod pulm htn   - repeat echo with EF 44% ; will need outpt st per cards   Lasix only after blood  - strict I/o and wts   - elec prot of care, discussing labs and imaging findings. Spoke with consultant.  All questions answered.                Consultations:    Dr Silvia Levin, Dr Markell De La Vega, Dr Nicole Conner, Dr Edwar Das , Dr Fulton Core     Procedures: Suprapubic catheter     Complications: none    Dis Refills: 0     Pantoprazole Sodium 40 MG Tbec  Commonly known as: PROTONIX      Take 1 tablet (40 mg total) by mouth every morning before breakfast.   Quantity: 90 tablet  Refills: 3     PEG 3350-KCl-NaBcb-NaCl-NaSulf 240 g Solr  Commonly known as: Colyte

## (undated) NOTE — LETTER
12/13/2019    [address] [place] [address]            Dear Brant Lemon,      Our records indicate that you are due for an appointment for a EGD or Upper Endoscopy in January 2020, or shortly there after, with Vandana Richmond

## (undated) NOTE — MR AVS SNAPSHOT
Chelsie Chou   2017 8:30 AM   Office Visit   MRN:  [id number]    Description:  Male : 1945   Provider:  Patel Petit   Department:  [facility] Hematology Oncology              Visit Summary      Primary Visit Diagnosis healthcare providers. At [facility] we continue to have an Open Medical Record policy. We can provide you with your current medication list and lab results today.   If you would like to review your medical record, we can assist you to se

## (undated) NOTE — LETTER
AUTHORIZATION FOR SURGICAL OPERATION OR OTHER PROCEDURE    1.  I hereby authorize Dr. Eamon Gonzalez, and [facility] staff assigned to my case to perform the following operation and/or procedure at the [facility]:  _____________________________ Time:  ________ A. M.  P.M.        Patient Name:  ______________________________________________________  (please print)      Patient signature:  ___________________________________________________             Relationship to Patient:

## (undated) NOTE — LETTER
[address], [place], [place]     AUTHORIZATION FOR SURGICAL OPERATION OR PROCEDURE    1.  I hereby authorize Dr. [address], my Physician(s) and whomever may be designated as the doctor's Assistant, to perform the following op 5. I consent to the photographing of procedure(s) to be performed for the purposes of advancing medicine, science and/or education, provided my identity is not revealed.  If the procedure has been videotaped, the physician/surgeon will obtain the original v (Witness signature)                                                                                                  (Date)                                (Time)  STATEMENT OF PHYSICIAN My signature below affirms that prior to the time of the procedure;  I